# Patient Record
Sex: FEMALE | Race: OTHER | Employment: FULL TIME | URBAN - METROPOLITAN AREA
[De-identification: names, ages, dates, MRNs, and addresses within clinical notes are randomized per-mention and may not be internally consistent; named-entity substitution may affect disease eponyms.]

---

## 2017-06-13 ENCOUNTER — HOSPITAL ENCOUNTER (OUTPATIENT)
Age: 42
Discharge: HOME | End: 2017-06-13
Attending: EMERGENCY MEDICINE
Payer: COMMERCIAL

## 2017-06-13 ENCOUNTER — APPOINTMENT (OUTPATIENT)
Dept: GENERAL RADIOLOGY | Age: 42
End: 2017-06-13
Attending: EMERGENCY MEDICINE
Payer: COMMERCIAL

## 2017-06-13 VITALS
RESPIRATION RATE: 16 BRPM | HEART RATE: 94 BPM | BODY MASS INDEX: 41.78 KG/M2 | WEIGHT: 260 LBS | DIASTOLIC BLOOD PRESSURE: 93 MMHG | SYSTOLIC BLOOD PRESSURE: 146 MMHG | OXYGEN SATURATION: 98 % | HEIGHT: 66 IN | TEMPERATURE: 98 F

## 2017-06-13 DIAGNOSIS — S86.911A KNEE STRAIN, RIGHT, INITIAL ENCOUNTER: Primary | ICD-10-CM

## 2017-06-13 PROCEDURE — 99203 OFFICE O/P NEW LOW 30 MIN: CPT

## 2017-06-13 PROCEDURE — 73560 X-RAY EXAM OF KNEE 1 OR 2: CPT | Performed by: EMERGENCY MEDICINE

## 2017-06-13 RX ORDER — ESCITALOPRAM OXALATE 10 MG/1
10 TABLET ORAL DAILY
COMMUNITY

## 2017-06-13 NOTE — ED PROVIDER NOTES
Patient Seen in: [facility] AND CLINICS Immediate Care In [address] [place] [address]    History   Patient presents with:  Lower Extremity Injury (musculoskeletal)    Stated Complaint: r knee pain    HPI  This evening patient was running to catch the train when she felt a pop i sounds and intact distal pulses. Pulmonary/Chest: Effort normal and breath sounds normal. No stridor. Abdominal: Soft. Bowel sounds are normal. There is no tenderness. Musculoskeletal: She exhibits no edema.         Right hip: Normal.        Right kn visit in 3 days  Follow up with your doctor is important      Medications Prescribed:  Current Discharge Medication List

## 2018-08-10 ENCOUNTER — HOSPITAL ENCOUNTER (OUTPATIENT)
Age: 43
Discharge: HOME | End: 2018-08-10
Attending: FAMILY MEDICINE
Payer: COMMERCIAL

## 2018-08-10 VITALS
DIASTOLIC BLOOD PRESSURE: 82 MMHG | TEMPERATURE: 99 F | OXYGEN SATURATION: 99 % | BODY MASS INDEX: 39 KG/M2 | HEART RATE: 85 BPM | SYSTOLIC BLOOD PRESSURE: 140 MMHG | RESPIRATION RATE: 16 BRPM | WEIGHT: 240 LBS

## 2018-08-10 DIAGNOSIS — J01.90 ACUTE SINUSITIS, RECURRENCE NOT SPECIFIED, UNSPECIFIED LOCATION: Primary | ICD-10-CM

## 2018-08-10 PROCEDURE — 99214 OFFICE O/P EST MOD 30 MIN: CPT

## 2018-08-10 PROCEDURE — 99213 OFFICE O/P EST LOW 20 MIN: CPT

## 2018-08-10 RX ORDER — AMOXICILLIN 875 MG/1
875 TABLET, COATED ORAL 2 TIMES DAILY
Qty: 20 TABLET | Refills: 0 | Status: SHIPPED | OUTPATIENT
Start: 2018-08-10 | End: 2018-08-20

## 2018-08-10 NOTE — ED PROVIDER NOTES
Pt seen in [facility] AND CLINICS Immediate Care In [place]      Stated Complaint: Patient presents with:  Cough/URI      --------------   RN assessment:     Cough, cold sx's for weeks  --------------    CC: cough, congestion    HPI:  Loman Rinne is a 37 ye Exam:   General :    Alert, cooperative, no distress, appears stated age   Head:    Normocephalic, without obvious abnormality, atraumatic   Eyes:    PERRL, conjunctiva/corneas clear, EOM's intact   Ears:    Normal TM's and external ear canals, both ears mouth 2 (two) times daily.   Qty: 20 tablet Refills: 0

## 2018-08-10 NOTE — ED INITIAL ASSESSMENT (HPI)
Cough with green phlegm, congestion, runny nose and sinus pressure x 3 weeks. +fever, chills, body aches.

## 2019-01-20 ENCOUNTER — HOSPITAL ENCOUNTER (OUTPATIENT)
Age: 44
Discharge: HOME | End: 2019-01-20
Attending: EMERGENCY MEDICINE
Payer: COMMERCIAL

## 2019-01-20 VITALS
OXYGEN SATURATION: 99 % | RESPIRATION RATE: 18 BRPM | DIASTOLIC BLOOD PRESSURE: 80 MMHG | BODY MASS INDEX: 36.16 KG/M2 | HEIGHT: 66 IN | WEIGHT: 225 LBS | SYSTOLIC BLOOD PRESSURE: 123 MMHG | TEMPERATURE: 98 F | HEART RATE: 83 BPM

## 2019-01-20 DIAGNOSIS — S61.217A LACERATION OF LEFT LITTLE FINGER, FOREIGN BODY PRESENCE UNSPECIFIED, NAIL DAMAGE STATUS UNSPECIFIED, INITIAL ENCOUNTER: Primary | ICD-10-CM

## 2019-01-20 PROCEDURE — 99213 OFFICE O/P EST LOW 20 MIN: CPT

## 2019-01-20 PROCEDURE — 90471 IMMUNIZATION ADMIN: CPT

## 2019-01-20 NOTE — ED PROVIDER NOTES
Patient Seen in: [facility] AND CLINICS Immediate Care In [address] [place] [address]    History   Patient presents with:  Laceration    Stated Complaint: laceration    HPI    Patient is a 51-year-old female that cut her left fifth finger with a mandolin slicer.   Happened earli days          Medications Prescribed:  Current Discharge Medication List

## 2019-05-09 ENCOUNTER — HOSPITAL ENCOUNTER (OUTPATIENT)
Age: 44
Discharge: HOME | End: 2019-05-09
Attending: EMERGENCY MEDICINE
Payer: COMMERCIAL

## 2019-05-09 VITALS
DIASTOLIC BLOOD PRESSURE: 68 MMHG | BODY MASS INDEX: 36.96 KG/M2 | OXYGEN SATURATION: 98 % | HEIGHT: 66 IN | HEART RATE: 80 BPM | TEMPERATURE: 98 F | WEIGHT: 230 LBS | SYSTOLIC BLOOD PRESSURE: 128 MMHG | RESPIRATION RATE: 16 BRPM

## 2019-05-09 DIAGNOSIS — B34.9 VIRAL SYNDROME: Primary | ICD-10-CM

## 2019-05-09 PROCEDURE — 85025 COMPLETE CBC W/AUTO DIFF WBC: CPT | Performed by: EMERGENCY MEDICINE

## 2019-05-09 PROCEDURE — 99214 OFFICE O/P EST MOD 30 MIN: CPT

## 2019-05-09 PROCEDURE — 80047 BASIC METABLC PNL IONIZED CA: CPT

## 2019-05-09 PROCEDURE — 96374 THER/PROPH/DIAG INJ IV PUSH: CPT

## 2019-05-09 PROCEDURE — 87502 INFLUENZA DNA AMP PROBE: CPT | Performed by: EMERGENCY MEDICINE

## 2019-05-09 RX ORDER — MELOXICAM 15 MG/1
TABLET ORAL
COMMUNITY
Start: 2019-05-04

## 2019-05-09 RX ORDER — ONDANSETRON 2 MG/ML
4 INJECTION INTRAMUSCULAR; INTRAVENOUS ONCE
Status: COMPLETED | OUTPATIENT
Start: 2019-05-09 | End: 2019-05-09

## 2019-05-09 RX ORDER — SODIUM CHLORIDE 9 MG/ML
1000 INJECTION, SOLUTION INTRAVENOUS ONCE
Status: COMPLETED | OUTPATIENT
Start: 2019-05-09 | End: 2019-05-09

## 2019-05-09 RX ORDER — ONDANSETRON 4 MG/1
4 TABLET, ORALLY DISINTEGRATING ORAL EVERY 6 HOURS PRN
Qty: 10 TABLET | Refills: 0 | Status: SHIPPED | OUTPATIENT
Start: 2019-05-09 | End: 2019-05-16

## 2019-05-09 NOTE — ED PROVIDER NOTES
Patient Seen in: [facility] AND [facility]INICS Immediate Care In [address] [place] [address]    History   Patient presents with:  Vomiting    Stated Complaint: weak/headache    HPI    Sudden onset of nausea on Sunday. Developed chills but no documented fever. She did vomit on Sunday. sensory deficit. She exhibits normal muscle tone. Skin: Skin is warm and dry. Capillary refill takes less than 2 seconds. Psychiatric: She has a normal mood and affect. Her behavior is normal.   Nursing note and vitals reviewed.            ED Course

## 2019-05-09 NOTE — ED INITIAL ASSESSMENT (HPI)
Pt states since Sunday, she has had weakness, headache and vomiting. No diarrhea, no cough, or sore throat.

## 2020-06-01 ENCOUNTER — TELEMEDICINE (OUTPATIENT)
Dept: ENDOCRINOLOGY CLINIC | Facility: CLINIC | Age: 45
End: 2020-06-01
Payer: COMMERCIAL

## 2020-06-01 DIAGNOSIS — R53.83 FATIGUE, UNSPECIFIED TYPE: Primary | ICD-10-CM

## 2020-06-01 PROCEDURE — 99203 OFFICE O/P NEW LOW 30 MIN: CPT | Performed by: INTERNAL MEDICINE

## 2020-06-01 NOTE — PROGRESS NOTES
Telehealth outside of [address] Verbal Consent   I conducted a telehealth visit with Andra Barbosa today, 06/01/20, which was completed using two-way, real-time interactive audio and video communication.  This has been done in good [place] [employer].  She was discussing her symptoms with friend and recommended evaluation by endocrine. Over the past few months she continues to eat well and exercise well but fatigue is persistent. +heat intolerance, no hair loss. Regular menstrual cycles. work of breathing. Skin:  normal moisture and skin texture  Hematologic:  no excessive bruising  Psychiatric:  oriented to time, self, and place      ASSESSMENT/PLAN:    1.   Fatigue  - Discussed nonspecific symptoms of thyroid disease  - Discussed that

## 2020-06-15 ENCOUNTER — APPOINTMENT (OUTPATIENT)
Dept: LAB | Age: 45
End: 2020-06-15
Attending: INTERNAL MEDICINE
Payer: COMMERCIAL

## 2020-06-15 DIAGNOSIS — R53.83 FATIGUE, UNSPECIFIED TYPE: ICD-10-CM

## 2020-06-15 PROCEDURE — 80053 COMPREHEN METABOLIC PANEL: CPT

## 2020-06-15 PROCEDURE — 86376 MICROSOMAL ANTIBODY EACH: CPT

## 2020-06-15 PROCEDURE — 83001 ASSAY OF GONADOTROPIN (FSH): CPT

## 2020-06-15 PROCEDURE — 82533 TOTAL CORTISOL: CPT

## 2020-06-15 PROCEDURE — 82306 VITAMIN D 25 HYDROXY: CPT

## 2020-06-15 PROCEDURE — 84443 ASSAY THYROID STIM HORMONE: CPT

## 2020-06-15 PROCEDURE — 80061 LIPID PANEL: CPT

## 2020-06-15 PROCEDURE — 36415 COLL VENOUS BLD VENIPUNCTURE: CPT

## 2020-06-15 PROCEDURE — 84439 ASSAY OF FREE THYROXINE: CPT

## 2020-06-17 ENCOUNTER — TELEPHONE (OUTPATIENT)
Dept: ENDOCRINOLOGY CLINIC | Facility: CLINIC | Age: 45
End: 2020-06-17

## 2020-06-17 DIAGNOSIS — E06.3 HASHIMOTO'S THYROIDITIS: Primary | ICD-10-CM

## 2020-06-17 NOTE — TELEPHONE ENCOUNTER
Please call patient - her labs do confirm diagnosis of autoimmune hypothyroidism or Hashimoto's thyroiditis. Recommend starting levothyroxine 50mcg PO daily, #30, refill 3 and recheck TSH, FT4 in 2 months. Thanks.

## 2020-06-18 ENCOUNTER — PATIENT MESSAGE (OUTPATIENT)
Dept: ENDOCRINOLOGY CLINIC | Facility: CLINIC | Age: 45
End: 2020-06-18

## 2020-06-18 DIAGNOSIS — E06.3 HASHIMOTO'S THYROIDITIS: ICD-10-CM

## 2020-06-18 DIAGNOSIS — R53.83 FATIGUE, UNSPECIFIED TYPE: Primary | ICD-10-CM

## 2020-06-18 RX ORDER — LEVOTHYROXINE SODIUM 0.05 MG/1
50 TABLET ORAL
Qty: 30 TABLET | Refills: 3 | Status: SHIPPED | OUTPATIENT
Start: 2020-06-18 | End: 2020-10-12

## 2020-06-18 NOTE — TELEPHONE ENCOUNTER
From: Huey Dale  To: Bella Kaur MD  Sent: 6/18/2020 1:13 PM CDT  Subject: Test Results Question    Lookong at the lab results, wanted to know if my free T3 and dhea sulfate were tested? If not, can I get those done too?     Pako Sanchez

## 2020-06-18 NOTE — TELEPHONE ENCOUNTER
Called patient and gave message by dr Miles Miles. Patient verbalized understanding of message.   rn discuss how to take medication first thing in the morning with water before breakfast .   rx sent to pharmacy and labs ordered  Sent patient a link to my chart p

## 2020-06-19 ENCOUNTER — APPOINTMENT (OUTPATIENT)
Dept: LAB | Age: 45
End: 2020-06-19
Attending: INTERNAL MEDICINE
Payer: COMMERCIAL

## 2020-06-19 DIAGNOSIS — E06.3 HASHIMOTO'S THYROIDITIS: ICD-10-CM

## 2020-06-19 DIAGNOSIS — R53.83 FATIGUE, UNSPECIFIED TYPE: ICD-10-CM

## 2020-06-19 PROCEDURE — 84443 ASSAY THYROID STIM HORMONE: CPT

## 2020-06-19 PROCEDURE — 84481 FREE ASSAY (FT-3): CPT

## 2020-06-19 PROCEDURE — 84439 ASSAY OF FREE THYROXINE: CPT

## 2020-06-19 PROCEDURE — 36415 COLL VENOUS BLD VENIPUNCTURE: CPT

## 2020-06-19 PROCEDURE — 82627 DEHYDROEPIANDROSTERONE: CPT

## 2020-09-14 ENCOUNTER — PATIENT MESSAGE (OUTPATIENT)
Dept: ENDOCRINOLOGY CLINIC | Facility: CLINIC | Age: 45
End: 2020-09-14

## 2020-09-14 DIAGNOSIS — E03.9 HYPOTHYROIDISM, UNSPECIFIED TYPE: Primary | ICD-10-CM

## 2020-09-15 NOTE — TELEPHONE ENCOUNTER
From: Chava Baumann  To: Haim Engel MD  Sent: 9/14/2020 5:44 PM CDT  Subject: Visit Follow-up Question    Should I be going for a recheck on blood now?

## 2020-09-15 NOTE — TELEPHONE ENCOUNTER
Dr. Gideon Montano,     Patient is due for TSH and FT4 based on 06/17/20 encounter. Do you want additional lab work? Previously this patient requested FT3 and DHEA. Please advise. Thank you.

## 2020-09-25 ENCOUNTER — LAB ENCOUNTER (OUTPATIENT)
Dept: LAB | Age: 45
End: 2020-09-25
Attending: INTERNAL MEDICINE
Payer: COMMERCIAL

## 2020-09-25 ENCOUNTER — TELEPHONE (OUTPATIENT)
Dept: ENDOCRINOLOGY CLINIC | Facility: CLINIC | Age: 45
End: 2020-09-25

## 2020-09-25 DIAGNOSIS — E03.9 HYPOTHYROIDISM, UNSPECIFIED TYPE: ICD-10-CM

## 2020-09-25 DIAGNOSIS — E03.9 HYPOTHYROIDISM, UNSPECIFIED TYPE: Primary | ICD-10-CM

## 2020-09-25 LAB
T3FREE SERPL-MCNC: 2.24 PG/ML (ref 2.4–4.2)
T4 FREE SERPL-MCNC: 1.1 NG/DL (ref 0.8–1.7)
TSI SER-ACNC: 3.06 MIU/ML (ref 0.36–3.74)

## 2020-09-25 PROCEDURE — 84443 ASSAY THYROID STIM HORMONE: CPT

## 2020-09-25 PROCEDURE — 84439 ASSAY OF FREE THYROXINE: CPT

## 2020-09-25 PROCEDURE — 84481 FREE ASSAY (FT-3): CPT

## 2020-09-25 PROCEDURE — 36415 COLL VENOUS BLD VENIPUNCTURE: CPT

## 2020-09-25 RX ORDER — LEVOTHYROXINE SODIUM 0.07 MG/1
75 TABLET ORAL
Qty: 90 TABLET | Refills: 1 | Status: SHIPPED | OUTPATIENT
Start: 2020-09-25 | End: 2021-03-31

## 2020-09-25 NOTE — TELEPHONE ENCOUNTER
r called patient with message by dr Osmani Amaya , verbalized understanding via repeat back . rn [facility] per [facility] request.  Labs ordered.

## 2020-09-25 NOTE — TELEPHONE ENCOUNTER
Please call patient - good news, her thyroid levels are improved but still not at goal.  Increase Levothyroxine to 75mcg PO daily #90, refill 1 and recheck TSH, Ft4 in 3 months. Thanks.

## 2020-10-12 RX ORDER — LEVOTHYROXINE SODIUM 0.05 MG/1
50 TABLET ORAL
Qty: 30 TABLET | Refills: 2 | Status: SHIPPED | OUTPATIENT
Start: 2020-10-12 | End: 2021-03-29

## 2020-11-20 ENCOUNTER — HOSPITAL ENCOUNTER (OUTPATIENT)
Age: 45
Discharge: HOME | End: 2020-11-20
Attending: EMERGENCY MEDICINE
Payer: COMMERCIAL

## 2020-11-20 VITALS
OXYGEN SATURATION: 99 % | HEART RATE: 98 BPM | WEIGHT: 230 LBS | SYSTOLIC BLOOD PRESSURE: 143 MMHG | DIASTOLIC BLOOD PRESSURE: 72 MMHG | HEIGHT: 66 IN | TEMPERATURE: 97 F | RESPIRATION RATE: 18 BRPM | BODY MASS INDEX: 36.96 KG/M2

## 2020-11-20 DIAGNOSIS — J06.9 VIRAL UPPER RESPIRATORY INFECTION: ICD-10-CM

## 2020-11-20 DIAGNOSIS — Z20.822 ENCOUNTER FOR LABORATORY TESTING FOR COVID-19 VIRUS: Primary | ICD-10-CM

## 2020-11-20 PROCEDURE — 87880 STREP A ASSAY W/OPTIC: CPT | Performed by: EMERGENCY MEDICINE

## 2020-11-20 PROCEDURE — 99213 OFFICE O/P EST LOW 20 MIN: CPT | Performed by: EMERGENCY MEDICINE

## 2020-11-20 NOTE — ED PROVIDER NOTES
Patient Seen in: [facility]      History   Patient presents with:  Sore Throat    Stated Complaint: STUFFY, SORE THROAT    HPI  2 days of runny nose postnasal drip and scratchy throat.   First day she noted significant fatigue and mild body ac are moist.      Pharynx: Uvula midline. No pharyngeal swelling, oropharyngeal exudate, posterior oropharyngeal erythema or uvula swelling.    Eyes:      Conjunctiva/sclera: Conjunctivae normal.   Neck:      Musculoskeletal: Normal range of motion and neck s List

## 2021-01-27 ENCOUNTER — LAB ENCOUNTER (OUTPATIENT)
Dept: LAB | Age: 46
End: 2021-01-27
Attending: INTERNAL MEDICINE
Payer: COMMERCIAL

## 2021-01-27 ENCOUNTER — PATIENT MESSAGE (OUTPATIENT)
Dept: ENDOCRINOLOGY CLINIC | Facility: CLINIC | Age: 46
End: 2021-01-27

## 2021-01-27 DIAGNOSIS — R73.01 IMPAIRED FASTING GLUCOSE: Primary | ICD-10-CM

## 2021-01-27 DIAGNOSIS — E03.9 HYPOTHYROIDISM, UNSPECIFIED TYPE: ICD-10-CM

## 2021-01-27 LAB
T4 FREE SERPL-MCNC: 1.3 NG/DL (ref 0.8–1.7)
TSI SER-ACNC: 1.78 MIU/ML (ref 0.36–3.74)

## 2021-01-27 PROCEDURE — 36415 COLL VENOUS BLD VENIPUNCTURE: CPT

## 2021-01-27 PROCEDURE — 84439 ASSAY OF FREE THYROXINE: CPT

## 2021-01-27 PROCEDURE — 84443 ASSAY THYROID STIM HORMONE: CPT

## 2021-01-27 NOTE — TELEPHONE ENCOUNTER
Dr. Dawood Pope,     Patient has labs pending to be drawn for TSH and FT4, Please review patient's complaints regarding increased thirst; would you like any additional labs to be drawn?      Looks like patient had full panel labs done on 6/19/20- including cortis

## 2021-01-27 NOTE — TELEPHONE ENCOUNTER
From: Ermias Munoz  To: Dinora Paz MD  Sent: 1/27/2021 8:46 AM CST  Subject: Other    I am going today for my labs. Sorry for thr delay. I did want to report that for the last several months I have become excessively thirsty.  I have dry mouth may be a

## 2021-01-28 ENCOUNTER — LAB ENCOUNTER (OUTPATIENT)
Dept: LAB | Facility: REFERENCE LAB | Age: 46
End: 2021-01-28
Attending: INTERNAL MEDICINE
Payer: COMMERCIAL

## 2021-01-28 DIAGNOSIS — R73.01 IMPAIRED FASTING GLUCOSE: ICD-10-CM

## 2021-01-28 LAB
ALBUMIN SERPL-MCNC: 3.7 G/DL (ref 3.4–5)
ALBUMIN/GLOB SERPL: 0.9 {RATIO} (ref 1–2)
ALP LIVER SERPL-CCNC: 91 U/L
ALT SERPL-CCNC: 21 U/L
ANION GAP SERPL CALC-SCNC: 5 MMOL/L (ref 0–18)
AST SERPL-CCNC: 13 U/L (ref 15–37)
BILIRUB SERPL-MCNC: 0.5 MG/DL (ref 0.1–2)
BUN BLD-MCNC: 14 MG/DL (ref 7–18)
BUN/CREAT SERPL: 15.1 (ref 10–20)
CALCIUM BLD-MCNC: 8.9 MG/DL (ref 8.5–10.1)
CHLORIDE SERPL-SCNC: 101 MMOL/L (ref 98–112)
CO2 SERPL-SCNC: 27 MMOL/L (ref 21–32)
CREAT BLD-MCNC: 0.93 MG/DL
EST. AVERAGE GLUCOSE BLD GHB EST-MCNC: 260 MG/DL (ref 68–126)
GLOBULIN PLAS-MCNC: 4.1 G/DL (ref 2.8–4.4)
GLUCOSE BLD-MCNC: 322 MG/DL (ref 70–99)
HBA1C MFR BLD HPLC: 10.7 % (ref ?–5.7)
M PROTEIN MFR SERPL ELPH: 7.8 G/DL (ref 6.4–8.2)
OSMOLALITY SERPL CALC.SUM OF ELEC: 289 MOSM/KG (ref 275–295)
PATIENT FASTING Y/N/NP: YES
POTASSIUM SERPL-SCNC: 4.3 MMOL/L (ref 3.5–5.1)
SODIUM SERPL-SCNC: 133 MMOL/L (ref 136–145)

## 2021-01-28 PROCEDURE — 80053 COMPREHEN METABOLIC PANEL: CPT

## 2021-01-28 PROCEDURE — 83036 HEMOGLOBIN GLYCOSYLATED A1C: CPT

## 2021-01-28 PROCEDURE — 36415 COLL VENOUS BLD VENIPUNCTURE: CPT

## 2021-01-29 ENCOUNTER — TELEPHONE (OUTPATIENT)
Dept: ENDOCRINOLOGY CLINIC | Facility: CLINIC | Age: 46
End: 2021-01-29

## 2021-01-29 NOTE — TELEPHONE ENCOUNTER
Left VM for patient to call - Please tell patient her labs do demonstrate a new diagnosis of diabetes which is causing her current symptoms. I would recommend scheduling appointment with APN next week. Thanks.

## 2021-02-01 ENCOUNTER — OFFICE VISIT (OUTPATIENT)
Dept: ENDOCRINOLOGY CLINIC | Facility: CLINIC | Age: 46
End: 2021-02-01
Payer: COMMERCIAL

## 2021-02-01 VITALS
WEIGHT: 256 LBS | DIASTOLIC BLOOD PRESSURE: 98 MMHG | HEART RATE: 85 BPM | BODY MASS INDEX: 41 KG/M2 | SYSTOLIC BLOOD PRESSURE: 149 MMHG

## 2021-02-01 DIAGNOSIS — E11.65 TYPE 2 DIABETES MELLITUS WITH HYPERGLYCEMIA, WITHOUT LONG-TERM CURRENT USE OF INSULIN (HCC): Primary | ICD-10-CM

## 2021-02-01 LAB
GLUCOSE BLOOD: 227
TEST STRIP LOT #: NORMAL NUMERIC

## 2021-02-01 PROCEDURE — 36416 COLLJ CAPILLARY BLOOD SPEC: CPT | Performed by: NURSE PRACTITIONER

## 2021-02-01 PROCEDURE — 3077F SYST BP >= 140 MM HG: CPT | Performed by: NURSE PRACTITIONER

## 2021-02-01 PROCEDURE — 82947 ASSAY GLUCOSE BLOOD QUANT: CPT | Performed by: NURSE PRACTITIONER

## 2021-02-01 PROCEDURE — 99205 OFFICE O/P NEW HI 60 MIN: CPT | Performed by: NURSE PRACTITIONER

## 2021-02-01 PROCEDURE — 3080F DIAST BP >= 90 MM HG: CPT | Performed by: NURSE PRACTITIONER

## 2021-02-01 RX ORDER — BLOOD-GLUCOSE METER
EACH MISCELLANEOUS
Qty: 1 KIT | Refills: 0 | Status: SHIPPED | OUTPATIENT
Start: 2021-02-01 | End: 2021-03-29

## 2021-02-01 RX ORDER — BLOOD SUGAR DIAGNOSTIC
STRIP MISCELLANEOUS
Qty: 180 EACH | Refills: 1 | Status: SHIPPED | OUTPATIENT
Start: 2021-02-01 | End: 2021-03-29

## 2021-02-01 RX ORDER — LANCETS
EACH MISCELLANEOUS
Qty: 180 EACH | Refills: 1 | Status: SHIPPED | OUTPATIENT
Start: 2021-02-01 | End: 2021-03-29

## 2021-02-01 NOTE — PROGRESS NOTES
Name: Tim Griffiths  Date: 2/1/2021    CHIEF COMPLAINT   No chief complaint on file. HISTORY OF PRESENT ILLNESS   Tim Griffiths is a 55year old female who presents for new consultation on diabetes management.    HgbA1C: 10.7% on 1/28/2021  Blood glu discomfort, palpitations  GI: Negative for:  abdominal pain, nausea, vomiting, diarrhea, heartburn, constipation  Neurology: Negative for: headache, dizziness, syncope, numbness/tingling, or weakness.    Genito-Urinary: Negative for: dysuria, frequency or h and normal sclera   Throat/Neck:  normal sound to voice. Respiratory: non-labored. no increased work of breathing.     Musculoskeletal:  normal muscle strength and tone  Skin:  normal moisture and skin texture  Psychiatric:  oriented to time, self, and 1/28/2021   c) Discussed importance of annual eye exams  e) start testing BG readings 1x daily- alternate testing times with before meals or 2hrs after meals.    f) Life style changes: Diet: low carbohydrate diet 30-45gm per meal discussed, Exercise: should

## 2021-02-01 NOTE — PATIENT INSTRUCTIONS
A1C:10.7% on 1/28/2021  Blood glucose: 227 in clinic today       Medications:   Start metformin 1,000mg twice daily with titration    Week 1: 500mg with dinner    Week 2: 500mg with breakfast and 500mg with dinner meal    Week 3: 500mg with breakfast and 1 include steroids, some water pills (diuretics), and medicines for mental health (antipsychotics). · Race and ethnicity. People who have , , [address], [place], or [place] [facility]) heritage are more likely to develop type 2 diabetes.   · Ge erythropoietin therapy. · Fasting plasma glucose (FPG). This test checks your blood glucose levels after 8 hours of fasting. You usually get this test before your first meal of the day. This is called your fasting blood glucose level.  A result higher than what foods to plan your meals around. · Weight loss. Losing just 5% to 7% of your body weight can help. Talk with your healthcare provider about ways to help you lose weight. · Taking medicine.  There are different types of medicines to treat type 2 diabe problems. · The goal of treatment is to keep your blood sugar levels as close to normal as possible, but not too low. You will need to control your blood sugar. You will need to get physical activity, plan meals, and get regular healthcare.     Next steps

## 2021-02-08 ENCOUNTER — PATIENT MESSAGE (OUTPATIENT)
Dept: ENDOCRINOLOGY CLINIC | Facility: CLINIC | Age: 46
End: 2021-02-08

## 2021-02-09 NOTE — TELEPHONE ENCOUNTER
From: Emily Ho  To: Chandler Hernández MD  Sent: 2/8/2021 8:19 PM CST  Subject: Test Results Question    So, following my blood work that you ordered which showed that I have diabetes, I opted to see a doctor that does what she calls 'integrative medicine'

## 2021-02-09 NOTE — TELEPHONE ENCOUNTER
Routing to Dr. Ruben Muñiz to advise and review attached blood test result.     Last seen by APN 2/1/21 and Dr. Ruben Muñiz on 06/01/20

## 2021-02-10 ENCOUNTER — HOSPITAL ENCOUNTER (OUTPATIENT)
Dept: ENDOCRINOLOGY | Facility: HOSPITAL | Age: 46
Discharge: HOME | End: 2021-02-10
Attending: NURSE PRACTITIONER
Payer: COMMERCIAL

## 2021-02-10 DIAGNOSIS — E11.9 TYPE 2 DIABETES MELLITUS WITHOUT COMPLICATION, WITHOUT LONG-TERM CURRENT USE OF INSULIN (HCC): Primary | ICD-10-CM

## 2021-02-10 NOTE — PROGRESS NOTES
Mic Solorzano  : 1975 attended individual initial assessment for Diabetes Education:    Date: 2/10/2021   Start time: 1 pm  End time: 2 pm    HgbA1C (%)   Date Value   2021 10.7 (H)        Assessment: Pt newly diagnosed type 2 DM.   Harley mariee series (4 classes)      Prescriptions sent to preferred pharmacy for blood glucose meter, test strips and lancets per protocol. Written materials provided for all areas covered.     Patient verbalized understanding and has no further questions at this time

## 2021-02-10 NOTE — TELEPHONE ENCOUNTER
Called patient to discuss lab results - Ok to take magnesium and Vitamin B. However would not recommend DHEA or cortisol supplement. Also would not recommend testosterone pellet.   Discussed importance of not taking any adrenal gland supplementation given

## 2021-03-01 ENCOUNTER — TELEPHONE (OUTPATIENT)
Dept: RHEUMATOLOGY | Facility: CLINIC | Age: 46
End: 2021-03-01

## 2021-03-01 NOTE — TELEPHONE ENCOUNTER
Patient was scheduled for an appointment with Nicolas Vides. Patient was notified and sent an appointment reminder, no response. Patient came in and was checked into the appointment.  Nicolas Vides then notified me that her patient was checked in in-person and to ask the p

## 2021-03-28 NOTE — PROGRESS NOTES
Andra Barbosa is a 55year old female who presents today to establish for type 2 diabetes management.    Primary care physician: between PCP offices     In the past 3m DM control has improved to 7.0% A1C  (1-2021 A1C was 10.7%)   She is testing 2- 3 x keo Diabetes ([facility] [address]    • Hypothyroidism     1-2021 + Hashimoto     Past Surgical History:   Procedure Laterality Date   • ABLATION  02/12/2021    cervical   • KNEE REPLACEMENT SURGERY       Social History    Tobacco Use      Smoking status: Never Smoker      Sm Heart sounds: Normal heart sounds. Pulmonary:      Effort: Pulmonary effort is normal.      Breath sounds: Normal breath sounds. Musculoskeletal:      Comments: Diabetes foot exam:   Good foot hygiene.    Bilateral barefoot skin diabetic exam: normal. follow up  Continue working janeen Mendoza for support/guidance with Diabetes      The patient is asked to return in 3 m  but recommended to contact DM clinic sooner if questions or concerns.     The patient indicates understanding of these issues a

## 2021-03-28 NOTE — TELEPHONE ENCOUNTER
Valerio Number,     Please review this refill request. Pt finding another provider?  3 mos of DM meds sent on 2/1/21 - however I do not see that thyroid meds were sent? Please review and advise as I am not able to fill per protocol while Dr Rivera Erps in out this week.     LOV 2/1/21  No F/U (cancelled 3/2/21, 3/1/21)    Med increased to 75 mcg 9/25/20

## 2021-03-29 ENCOUNTER — OFFICE VISIT (OUTPATIENT)
Dept: ENDOCRINOLOGY CLINIC | Facility: CLINIC | Age: 46
End: 2021-03-29
Payer: COMMERCIAL

## 2021-03-29 ENCOUNTER — PATIENT MESSAGE (OUTPATIENT)
Dept: ENDOCRINOLOGY CLINIC | Facility: CLINIC | Age: 46
End: 2021-03-29

## 2021-03-29 VITALS
SYSTOLIC BLOOD PRESSURE: 130 MMHG | OXYGEN SATURATION: 99 % | HEART RATE: 101 BPM | DIASTOLIC BLOOD PRESSURE: 68 MMHG | WEIGHT: 249 LBS | HEIGHT: 66 IN | BODY MASS INDEX: 40.02 KG/M2

## 2021-03-29 DIAGNOSIS — E11.65 TYPE 2 DIABETES MELLITUS WITH HYPERGLYCEMIA, WITHOUT LONG-TERM CURRENT USE OF INSULIN (HCC): Primary | ICD-10-CM

## 2021-03-29 DIAGNOSIS — E06.3 HASHIMOTO'S THYROIDITIS: ICD-10-CM

## 2021-03-29 LAB
CARTRIDGE LOT#: 762 NUMERIC
HEMOGLOBIN A1C: 7 % (ref 4.3–5.6)

## 2021-03-29 PROCEDURE — 3008F BODY MASS INDEX DOCD: CPT | Performed by: NURSE PRACTITIONER

## 2021-03-29 PROCEDURE — 83036 HEMOGLOBIN GLYCOSYLATED A1C: CPT | Performed by: NURSE PRACTITIONER

## 2021-03-29 PROCEDURE — 99215 OFFICE O/P EST HI 40 MIN: CPT | Performed by: NURSE PRACTITIONER

## 2021-03-29 PROCEDURE — 3078F DIAST BP <80 MM HG: CPT | Performed by: NURSE PRACTITIONER

## 2021-03-29 PROCEDURE — 3075F SYST BP GE 130 - 139MM HG: CPT | Performed by: NURSE PRACTITIONER

## 2021-03-29 RX ORDER — COLLAGENASE CLOSTRIDIUM HIST.
POWDER (EA) MISCELLANEOUS
COMMUNITY
Start: 2021-01-04

## 2021-03-29 RX ORDER — CYCLOBENZAPRINE HCL 10 MG
TABLET ORAL
COMMUNITY
Start: 2020-11-16

## 2021-03-29 NOTE — PATIENT INSTRUCTIONS
Your A1C: 7.0% (last A1C 10.7%)   This is so much better for you and beneficial for your long term health     Dr Sahil Hanson- endocrinologist   [address] [place] [address], [address] [place] [address] Rd  [phone]    The A1C test provides us with your average detection of N-Nitrosodimethylamine ([address]) chemical   [address] is a common contaminant found in water and foods including cured and grilled meats, dairy products and vegetables. Everyone is exposed to some level of [address].  Low levels of [address] are commonly ingeste across medication types. The FDA has maintained that it would continue with ongoing testing of metformin and other medications, and if any levels of [address] or other impurities were identified, membreno action would be taken.      The FDA and the international s providers  do not authorize routine medications on weekends. · If your prescription is due for a refill, you may be due for a follow up appointment. This may impact the ability for you to get a 90 supply if requested.    · To best provide you care, patient

## 2021-03-29 NOTE — TELEPHONE ENCOUNTER
From: Rayray Guthrie  To: TALIA Rader  Sent: 3/29/2021 9:33 AM CDT  Subject: Other    Other supplements I take.

## 2021-03-29 NOTE — ASSESSMENT & PLAN NOTE
Names provided for endocrinology or she can maintain w PCP for hypothyroidism management   Advised since she has confirmed autoimmune hypothyroid NOT to stop Levothyroxine rx.

## 2021-03-31 RX ORDER — LEVOTHYROXINE SODIUM 0.07 MG/1
75 TABLET ORAL
Qty: 90 TABLET | Refills: 0 | Status: SHIPPED | OUTPATIENT
Start: 2021-03-31 | End: 2021-04-22

## 2021-04-05 RX ORDER — LEVOTHYROXINE SODIUM 0.07 MG/1
75 TABLET ORAL
Qty: 30 TABLET | Refills: 3 | OUTPATIENT
Start: 2021-04-05

## 2021-04-22 RX ORDER — LEVOTHYROXINE SODIUM 0.07 MG/1
75 TABLET ORAL
Qty: 30 TABLET | Refills: 2 | Status: SHIPPED | OUTPATIENT
Start: 2021-04-22 | End: 2021-07-19

## 2021-05-17 ENCOUNTER — EKG ENCOUNTER (OUTPATIENT)
Dept: LAB | Age: 46
End: 2021-05-17
Attending: INTERNAL MEDICINE
Payer: COMMERCIAL

## 2021-05-17 ENCOUNTER — OFFICE VISIT (OUTPATIENT)
Dept: INTERNAL MEDICINE CLINIC | Facility: CLINIC | Age: 46
End: 2021-05-17
Payer: COMMERCIAL

## 2021-05-17 VITALS
SYSTOLIC BLOOD PRESSURE: 138 MMHG | WEIGHT: 248 LBS | DIASTOLIC BLOOD PRESSURE: 79 MMHG | BODY MASS INDEX: 39.86 KG/M2 | HEART RATE: 76 BPM | HEIGHT: 66 IN

## 2021-05-17 DIAGNOSIS — E11.9 TYPE 2 DIABETES MELLITUS WITHOUT COMPLICATION, WITHOUT LONG-TERM CURRENT USE OF INSULIN (HCC): ICD-10-CM

## 2021-05-17 DIAGNOSIS — E06.3 HASHIMOTO'S THYROIDITIS: Primary | ICD-10-CM

## 2021-05-17 DIAGNOSIS — E55.9 VITAMIN D DEFICIENCY: ICD-10-CM

## 2021-05-17 DIAGNOSIS — F41.9 ANXIETY: ICD-10-CM

## 2021-05-17 DIAGNOSIS — Z12.31 SCREENING MAMMOGRAM, ENCOUNTER FOR: ICD-10-CM

## 2021-05-17 PROCEDURE — 3075F SYST BP GE 130 - 139MM HG: CPT | Performed by: INTERNAL MEDICINE

## 2021-05-17 PROCEDURE — 3078F DIAST BP <80 MM HG: CPT | Performed by: INTERNAL MEDICINE

## 2021-05-17 PROCEDURE — 3008F BODY MASS INDEX DOCD: CPT | Performed by: INTERNAL MEDICINE

## 2021-05-17 PROCEDURE — 93010 ELECTROCARDIOGRAM REPORT: CPT | Performed by: INTERNAL MEDICINE

## 2021-05-17 PROCEDURE — 99204 OFFICE O/P NEW MOD 45 MIN: CPT | Performed by: INTERNAL MEDICINE

## 2021-05-17 PROCEDURE — 93005 ELECTROCARDIOGRAM TRACING: CPT

## 2021-05-17 RX ORDER — GREEN TEA/HOODIA GORDONII 315-12.5MG
CAPSULE ORAL
COMMUNITY

## 2021-05-17 RX ORDER — MAGNESIUM 200 MG
TABLET ORAL
COMMUNITY

## 2021-05-17 NOTE — PROGRESS NOTES
Viola Hernandez is a 55year old female.   Patient presents with:  Establish Care      HPI:   New pt-- used to see radha forbes [facility] in [facility] -- moved to [place] 5 yrs ago   C/c establish care   C/o needs to establish care with a primary care physician and get ch meniscus repair    • Other surgical history      exp lap    • Other surgical history      sinus surg x 2       Social History:  Social History    Tobacco Use      Smoking status: Never Smoker      Smokeless tobacco: Never Used    Vaping Use      Vaping Use Exercise 30 min a day         Preventive medicine  Pap smear she goes to [facility] - ordered       The patient indicates understanding of these issues and agrees to the plan. No follow-ups on file.

## 2021-05-18 ENCOUNTER — PATIENT MESSAGE (OUTPATIENT)
Dept: INTERNAL MEDICINE CLINIC | Facility: CLINIC | Age: 46
End: 2021-05-18

## 2021-05-18 ENCOUNTER — TELEPHONE (OUTPATIENT)
Dept: INTERNAL MEDICINE CLINIC | Facility: CLINIC | Age: 46
End: 2021-05-18

## 2021-05-18 DIAGNOSIS — R07.89 ATYPICAL CHEST PAIN: Primary | ICD-10-CM

## 2021-05-18 NOTE — TELEPHONE ENCOUNTER
Patient states she seen Dr. Elda Wen yesterday about chest pain. Pain located on right side of chest under collar bone. Per patient, pain is sporadic. Pain currently gone. Per patient, she has been having chest pain for several weeks.      Patient st

## 2021-05-18 NOTE — TELEPHONE ENCOUNTER
----- Message from [address] [place], [place] [address]Jailene Bob sent at 5/18/2021  4:38 PM CDT -----  Regarding: Non-Urgent Medical Question  Contact: [phone]  Thanks for message om EKG. I keep getting these mild pains in my chest. I am having them now.  I think I should see a car

## 2021-05-18 NOTE — TELEPHONE ENCOUNTER
From: Mic Solorzano  To: Toshia Galicia MD  Sent: 5/18/2021 4:38 PM CDT  Subject: Non-Urgent Medical Question    Thanks for message om EKG. I keep getting these mild pains in my chest. I am having them now.  I think I should see a cardiologist just to find

## 2021-05-25 ENCOUNTER — TELEPHONE (OUTPATIENT)
Dept: CARDIOLOGY CLINIC | Facility: CLINIC | Age: 46
End: 2021-05-25

## 2021-05-25 NOTE — TELEPHONE ENCOUNTER
Detailed message left (HIPAA verified) offered 6/11 at 3:10 consult with Dr. Joan Currie (on waiting list). She currently has appointment on 6/25. Asked her to call ASAP as there are others waiting. Please advise if patient accepts.

## 2021-05-27 ENCOUNTER — HOSPITAL ENCOUNTER (OUTPATIENT)
Dept: MAMMOGRAPHY | Age: 46
Discharge: HOME | End: 2021-05-27
Attending: INTERNAL MEDICINE
Payer: COMMERCIAL

## 2021-05-27 DIAGNOSIS — Z12.31 SCREENING MAMMOGRAM, ENCOUNTER FOR: ICD-10-CM

## 2021-05-27 PROCEDURE — 77063 BREAST TOMOSYNTHESIS BI: CPT | Performed by: INTERNAL MEDICINE

## 2021-05-27 PROCEDURE — 77067 SCR MAMMO BI INCL CAD: CPT | Performed by: INTERNAL MEDICINE

## 2021-06-11 ENCOUNTER — OFFICE VISIT (OUTPATIENT)
Dept: CARDIOLOGY CLINIC | Facility: CLINIC | Age: 46
End: 2021-06-11
Payer: COMMERCIAL

## 2021-06-11 DIAGNOSIS — Z82.49 FAMILY HISTORY OF CORONARY ARTERY DISEASE IN FATHER: ICD-10-CM

## 2021-06-11 DIAGNOSIS — R07.2 PRECORDIAL PAIN: Primary | ICD-10-CM

## 2021-06-11 PROCEDURE — 99244 OFF/OP CNSLTJ NEW/EST MOD 40: CPT | Performed by: INTERNAL MEDICINE

## 2021-06-11 RX ORDER — SEMAGLUTIDE 1.34 MG/ML
INJECTION, SOLUTION SUBCUTANEOUS
COMMUNITY
Start: 2021-05-19

## 2021-06-11 NOTE — PATIENT INSTRUCTIONS
Continue current medications. Proceed with stress echocardiogram as discussed. Follow-up with me in 4 weeks or sooner if needed.

## 2021-06-11 NOTE — PROGRESS NOTES
Julius Robins is a 55year old female. Patient presents with:  Consult  Chest Pain: right sided chest pain    HPI:   Patient is here for a new patient appointment. She has recently been diagnosed diabetes and has been treated for it aggressively.   She has Never Used    Vaping Use      Vaping Use: Never assessed    Alcohol use: Yes      Comment: occa    Drug use: Never      Comment: occ gummies        REVIEW OF SYSTEMS:   GENERAL HEALTH: feels well otherwise  SKIN: denies any unusual skin lesions or rashes

## 2021-07-19 RX ORDER — LEVOTHYROXINE SODIUM 0.07 MG/1
75 TABLET ORAL
Qty: 30 TABLET | Refills: 2 | Status: SHIPPED | OUTPATIENT
Start: 2021-07-19 | End: 2021-10-15

## 2021-10-15 RX ORDER — LEVOTHYROXINE SODIUM 0.07 MG/1
75 TABLET ORAL
Qty: 30 TABLET | Refills: 2 | Status: SHIPPED | OUTPATIENT
Start: 2021-10-15

## 2022-01-17 ENCOUNTER — TELEPHONE (OUTPATIENT)
Dept: ENDOCRINOLOGY CLINIC | Facility: CLINIC | Age: 47
End: 2022-01-17

## 2022-01-17 NOTE — TELEPHONE ENCOUNTER
Received fax on pt going on a statin medication from [facility]. Called pt and pt is now being seen by TA Tiwari. LOV with dick 3/2021 was told to see an endocrinologist at last visit.

## 2022-02-21 ENCOUNTER — PATIENT MESSAGE (OUTPATIENT)
Dept: INTERNAL MEDICINE CLINIC | Facility: CLINIC | Age: 47
End: 2022-02-21

## 2022-02-22 NOTE — TELEPHONE ENCOUNTER
Please see my chart and advise, pended script if you agree to fill. Patient's last office visit was 5/17/21 and medication reads as historical in chart.  Advised to call and schedule a physical.

## 2022-02-23 RX ORDER — CYCLOBENZAPRINE HCL 10 MG
10 TABLET ORAL NIGHTLY
Qty: 30 TABLET | Refills: 0 | Status: SHIPPED | OUTPATIENT
Start: 2022-02-23 | End: 2022-03-23

## 2022-02-23 NOTE — TELEPHONE ENCOUNTER
The patient was called and informed with understanding. Phone numbers were given. Will call us back if needed.

## 2022-03-21 NOTE — TELEPHONE ENCOUNTER
Please review. Protocol Failed or has no Protocol.     Requested Prescriptions   Pending Prescriptions Disp Refills    CYCLOBENZAPRINE 10 MG Oral Tab [Pharmacy Med Name: CYCLOBENZAPRINE 10 MG TABLET] 30 tablet 0     Sig: TAKE 1 TABLET BY MOUTH EVERY DAY AT NIGHT        There is no refill protocol information for this order          Recent Outpatient Visits              9 months ago Precordial pain    [facility] Evelina Miguel MD    Office Visit    10 months ago Hashimoto's thyroiditis    [place] [facility], [address]    Office Visit    11 months ago Type 2 diabetes mellitus with hyperglycemia, without long-term current use of insulin ([facility]    [facility], TALIA Andrade    Office Visit    1 year ago Type 2 diabetes mellitus with hyperglycemia, without long-term current use of insulin [facility])    [facility], [facility] TALIA Berry    Office Visit    1 year ago Fatigue, unspecified type    [facility], [facility] Yvonne Mart MD    Telemedicine

## 2022-03-22 RX ORDER — CYCLOBENZAPRINE HCL 10 MG
10 TABLET ORAL NIGHTLY
Qty: 30 TABLET | Refills: 1 | OUTPATIENT
Start: 2022-03-22

## 2022-03-22 NOTE — TELEPHONE ENCOUNTER
No Virgili medication-may be intermittent refill  Patient is also due for follow-up appointment at her convenience.

## 2022-03-23 ENCOUNTER — TELEMEDICINE (OUTPATIENT)
Dept: INTERNAL MEDICINE CLINIC | Facility: CLINIC | Age: 47
End: 2022-03-23

## 2022-03-23 DIAGNOSIS — E11.65 TYPE 2 DIABETES MELLITUS WITH HYPERGLYCEMIA, WITHOUT LONG-TERM CURRENT USE OF INSULIN (HCC): ICD-10-CM

## 2022-03-23 DIAGNOSIS — F32.A DEPRESSION, UNSPECIFIED DEPRESSION TYPE: ICD-10-CM

## 2022-03-23 DIAGNOSIS — Z12.11 COLON CANCER SCREENING: ICD-10-CM

## 2022-03-23 DIAGNOSIS — M47.812 OSTEOARTHRITIS OF CERVICAL SPINE, UNSPECIFIED SPINAL OSTEOARTHRITIS COMPLICATION STATUS: ICD-10-CM

## 2022-03-23 DIAGNOSIS — M47.816 OSTEOARTHRITIS OF LUMBAR SPINE, UNSPECIFIED SPINAL OSTEOARTHRITIS COMPLICATION STATUS: Primary | ICD-10-CM

## 2022-03-23 PROCEDURE — 99213 OFFICE O/P EST LOW 20 MIN: CPT | Performed by: INTERNAL MEDICINE

## 2022-03-23 RX ORDER — ESCITALOPRAM OXALATE 10 MG/1
10 TABLET ORAL DAILY
Qty: 90 TABLET | Refills: 3 | Status: SHIPPED | OUTPATIENT
Start: 2022-03-23

## 2022-03-23 RX ORDER — MELOXICAM 15 MG/1
15 TABLET ORAL DAILY
Qty: 90 TABLET | Refills: 3 | Status: SHIPPED | OUTPATIENT
Start: 2022-03-23

## 2022-03-23 RX ORDER — CYCLOBENZAPRINE HCL 10 MG
10 TABLET ORAL NIGHTLY
Qty: 90 TABLET | Refills: 3 | Status: SHIPPED | OUTPATIENT
Start: 2022-03-23

## 2022-04-14 ENCOUNTER — HOSPITAL ENCOUNTER (OUTPATIENT)
Dept: MRI IMAGING | Age: 47
Discharge: HOME | End: 2022-04-14
Attending: ANESTHESIOLOGY
Payer: COMMERCIAL

## 2022-04-14 DIAGNOSIS — M54.2 CERVICAL PAIN: ICD-10-CM

## 2022-04-14 PROCEDURE — 72141 MRI NECK SPINE W/O DYE: CPT | Performed by: ANESTHESIOLOGY

## 2022-04-22 PROCEDURE — 3044F HG A1C LEVEL LT 7.0%: CPT | Performed by: INTERNAL MEDICINE

## 2022-06-10 ENCOUNTER — LAB ENCOUNTER (OUTPATIENT)
Dept: LAB | Age: 47
End: 2022-06-10
Attending: INTERNAL MEDICINE
Payer: COMMERCIAL

## 2022-06-10 ENCOUNTER — OFFICE VISIT (OUTPATIENT)
Dept: INTERNAL MEDICINE CLINIC | Facility: CLINIC | Age: 47
End: 2022-06-10
Payer: COMMERCIAL

## 2022-06-10 VITALS
HEART RATE: 80 BPM | HEIGHT: 66 IN | SYSTOLIC BLOOD PRESSURE: 138 MMHG | WEIGHT: 239 LBS | DIASTOLIC BLOOD PRESSURE: 84 MMHG | BODY MASS INDEX: 38.41 KG/M2

## 2022-06-10 DIAGNOSIS — E11.65 TYPE 2 DIABETES MELLITUS WITH HYPERGLYCEMIA, WITHOUT LONG-TERM CURRENT USE OF INSULIN (HCC): ICD-10-CM

## 2022-06-10 DIAGNOSIS — Z00.00 PHYSICAL EXAM, ANNUAL: Primary | ICD-10-CM

## 2022-06-10 DIAGNOSIS — Z12.31 SCREENING MAMMOGRAM, ENCOUNTER FOR: ICD-10-CM

## 2022-06-10 PROBLEM — E66.01 MORBID (SEVERE) OBESITY DUE TO EXCESS CALORIES (HCC): Status: ACTIVE | Noted: 2022-06-10

## 2022-06-10 PROBLEM — F33.0 MAJOR DEPRESSIVE DISORDER, RECURRENT, MILD (HCC): Status: ACTIVE | Noted: 2022-06-10

## 2022-06-10 PROBLEM — F33.0 MAJOR DEPRESSIVE DISORDER, RECURRENT, MILD: Status: ACTIVE | Noted: 2022-06-10

## 2022-06-10 LAB
CREAT UR-SCNC: 73.3 MG/DL
MICROALBUMIN UR-MCNC: 0.54 MG/DL
MICROALBUMIN/CREAT 24H UR-RTO: 7.4 UG/MG (ref ?–30)

## 2022-06-10 PROCEDURE — 99396 PREV VISIT EST AGE 40-64: CPT | Performed by: INTERNAL MEDICINE

## 2022-06-10 PROCEDURE — 3008F BODY MASS INDEX DOCD: CPT | Performed by: INTERNAL MEDICINE

## 2022-06-10 PROCEDURE — 82043 UR ALBUMIN QUANTITATIVE: CPT

## 2022-06-10 PROCEDURE — 3079F DIAST BP 80-89 MM HG: CPT | Performed by: INTERNAL MEDICINE

## 2022-06-10 PROCEDURE — 82570 ASSAY OF URINE CREATININE: CPT

## 2022-06-10 PROCEDURE — 3075F SYST BP GE 130 - 139MM HG: CPT | Performed by: INTERNAL MEDICINE

## 2022-06-10 RX ORDER — DULAGLUTIDE 0.75 MG/.5ML
INJECTION, SOLUTION SUBCUTANEOUS
COMMUNITY
Start: 2021-12-28

## 2022-06-10 RX ORDER — METFORMIN HYDROCHLORIDE 500 MG/1
500 TABLET, EXTENDED RELEASE ORAL DAILY
COMMUNITY
Start: 2022-05-20

## 2022-06-17 ENCOUNTER — HOSPITAL ENCOUNTER (OUTPATIENT)
Dept: MAMMOGRAPHY | Age: 47
Discharge: HOME | End: 2022-06-17
Attending: INTERNAL MEDICINE
Payer: COMMERCIAL

## 2022-06-17 DIAGNOSIS — Z12.31 SCREENING MAMMOGRAM, ENCOUNTER FOR: ICD-10-CM

## 2022-06-17 PROCEDURE — 77063 BREAST TOMOSYNTHESIS BI: CPT | Performed by: INTERNAL MEDICINE

## 2022-06-17 PROCEDURE — 77067 SCR MAMMO BI INCL CAD: CPT | Performed by: INTERNAL MEDICINE

## 2022-06-21 ENCOUNTER — NURSE ONLY (OUTPATIENT)
Dept: GASTROENTEROLOGY | Facility: CLINIC | Age: 47
End: 2022-06-21

## 2022-06-21 DIAGNOSIS — Z12.11 SCREEN FOR COLON CANCER: Primary | ICD-10-CM

## 2022-06-21 NOTE — PROGRESS NOTES
Dx: average risk crc screening  Colonoscopy with IV or MAC sedation  Split dose golytely preparation, sent to pharmacy  Please review prep instructions with patient    - HOLD ACE/ARBs the night before and/or the day of the procedure(s) -   - NO herbal supplements or weight loss medications x 7 days prior to the procedure(s)    - hold metformin day prior to procedure.  trulicity per endocrine

## 2022-06-21 NOTE — PROGRESS NOTES
Dr. Swathi Buchanan,     Called patient for a scheduled telephone colon screening. Hx of lap band     GI MD preference: no   Insurance:   BCBS  CBC from: 4/22/2022 show no signs of anemia   PCP visit on: 6/10/2022    First colonoscopy: yes     Anticoagulants: no  Diabetic Meds:  trulicity q Thursday , metformin   BP meds(Ace inhibitors/ARB's): no  Weight loss meds (phentermine/vyvanse): no   Iron supplement (RX/OTC): no   Supplements: yes  Height & Weight/BMI: 5'6\"/239lbs/38  Hx of Cardiac/CVA issues/(MI/Stroke): no   Devices Pacemaker/Defibrillator/Stents: no   Resp. Issues/Oxygen Use/RONALDO/COPD: no   Issues w/Anesthesia: issues with paralytic agents; ALLERGY SUCCINYLCHOLINE     Symptoms (Y/N): no    Family history of colon cancer: no    Medications, pharmacy, and allergies verified with patient over the phone. Please advise on orders and prep, thank you.

## 2022-06-22 NOTE — PROGRESS NOTES
GI clinical staff-     Please obtain trulicity orders per endocrine. Patient is scheduled for cln on Friday, 8/5/2022 w/Dr. Jas Kasper. Per pt. Injections are done q Thursday. May close TE when appropriate. Thank you.

## 2022-06-22 NOTE — PROGRESS NOTES
Scheduled for:  Colonoscopy-screen 20205    Provider Name:  Dr. Chris Wetzel  Date:  8/5/2022  Location:  [facility]  Sedation:  IV  Time:  9:45 AM (pt is aware to arrive at 8:45 AM)  Prep:  Split dose golytely   Meds/Allergies Reconciled?:  Physician reviewed  Diagnosis with codes:  Colorectal cancer screening Z12.11  Was patient informed to call insurance with codes (Y/N):  I confirmed [employer] with this patient. Referral sent?:  Referral was sent at the time of electronic surgical scheduling. [facility] or [address] notified?:  I sent an electronic request to Endo Scheduling and received a confirmation today. Medication Orders:  HOLD METFORMIN THE DAY PRIOR TO PROCEDURE; TRULICITY PER ENDOCRINE   Misc Orders:  Patient was informed that they will need a COVID 19 test prior to their procedure. Patient verbally understood & will await a phone call from [facility] to schedule. Further instructions given by staff:  I discussed the prep instructions with the patient which she verbally understood and is aware that I will send the instructions today via [address]

## 2022-06-22 NOTE — PROGRESS NOTES
I spoke to the pt and [facility] her endocrinologist Dr Ranjit Del Rio from [facility] and ask her how she should take her Trulicity prior to her colonoscopy.      Pt will message us back when she finds out the answer

## 2022-06-30 ENCOUNTER — HOSPITAL ENCOUNTER (OUTPATIENT)
Dept: MAMMOGRAPHY | Facility: HOSPITAL | Age: 47
Discharge: HOME | End: 2022-06-30
Attending: INTERNAL MEDICINE
Payer: COMMERCIAL

## 2022-06-30 ENCOUNTER — HOSPITAL ENCOUNTER (OUTPATIENT)
Dept: ULTRASOUND IMAGING | Facility: HOSPITAL | Age: 47
Discharge: HOME | End: 2022-06-30
Attending: INTERNAL MEDICINE
Payer: COMMERCIAL

## 2022-06-30 DIAGNOSIS — R92.8 ABNORMAL MAMMOGRAM: ICD-10-CM

## 2022-06-30 PROCEDURE — 77065 DX MAMMO INCL CAD UNI: CPT | Performed by: INTERNAL MEDICINE

## 2022-06-30 PROCEDURE — 77061 BREAST TOMOSYNTHESIS UNI: CPT | Performed by: INTERNAL MEDICINE

## 2022-06-30 PROCEDURE — 76642 ULTRASOUND BREAST LIMITED: CPT | Performed by: INTERNAL MEDICINE

## 2022-07-07 ENCOUNTER — HOSPITAL ENCOUNTER (OUTPATIENT)
Dept: MAMMOGRAPHY | Facility: HOSPITAL | Age: 47
Discharge: HOME | End: 2022-07-07
Attending: INTERNAL MEDICINE
Payer: COMMERCIAL

## 2022-07-07 DIAGNOSIS — N64.89 DISTORTION OF CONTOUR OF BREAST: ICD-10-CM

## 2022-07-07 PROCEDURE — 88305 TISSUE EXAM BY PATHOLOGIST: CPT | Performed by: INTERNAL MEDICINE

## 2022-07-07 PROCEDURE — 19081 BX BREAST 1ST LESION STRTCTC: CPT | Performed by: INTERNAL MEDICINE

## 2022-07-07 NOTE — PROCEDURES
[facility]  Procedure Note    January Zheng Patient Status:  Outpatient    1975 MRN [id number]   Location [address] [place] [address] Attending Kelli Castanon MD   Hosp Day # 0 PCP Nava Burger MD     Procedure: tomosynthesis guided biopsy of the left breast    Pre-Procedure Diagnosis:  Left breast slightly lateral AD    Post-Procedure Diagnosis: Left breast slightly lateral AD    Anesthesia:  Local    Findings:  Left breast slightly lateral AD    Specimens: 6    Blood Loss:  minimal    Tourniquet Time: none  Complications:  None  Drains:  None    Charanjit Freitas DO  2022

## 2022-07-12 ENCOUNTER — PATIENT MESSAGE (OUTPATIENT)
Dept: INTERNAL MEDICINE CLINIC | Facility: CLINIC | Age: 47
End: 2022-07-12

## 2022-07-12 ENCOUNTER — TELEPHONE (OUTPATIENT)
Dept: INTERNAL MEDICINE CLINIC | Facility: CLINIC | Age: 47
End: 2022-07-12

## 2022-07-12 LAB — AMB EXT COVID-19 RESULT: DETECTED

## 2022-07-13 ENCOUNTER — LAB ENCOUNTER (OUTPATIENT)
Dept: LAB | Age: 47
End: 2022-07-13
Attending: INTERNAL MEDICINE
Payer: COMMERCIAL

## 2022-07-13 ENCOUNTER — TELEMEDICINE (OUTPATIENT)
Dept: INTERNAL MEDICINE CLINIC | Facility: CLINIC | Age: 47
End: 2022-07-13

## 2022-07-13 DIAGNOSIS — U07.1 COVID: Primary | ICD-10-CM

## 2022-07-13 DIAGNOSIS — Z11.52 ENCOUNTER FOR SCREENING FOR COVID-19: ICD-10-CM

## 2022-07-13 PROCEDURE — 99213 OFFICE O/P EST LOW 20 MIN: CPT | Performed by: NURSE PRACTITIONER

## 2022-07-13 NOTE — TELEPHONE ENCOUNTER
From: Jj Joseph  To: Zaria Hernandez MD  Sent: 7/12/2022 9:36 PM CDT  Subject: Paxlovid    I just tested positive and friend suggested I see if you will prescribe Palovid? Home test result attached.

## 2022-07-13 NOTE — TELEPHONE ENCOUNTER
Patient returning phone call, requesting Paxlovid. Future Appointments   Date Time Provider Mars Powers   7/13/2022  2:20 PM TALIA Sims [facility]   8/5/2022  9:45 AM MA, PROCEDURE [id number] None       Patient scheduled for video visit. Patient advised to complete the e-check in in [employer], if active. Understands to follow the prompts and links to complete the visit. Patient advised that there may be a co-pay involved in this type of visit. Patient agreed to proceed, they understand the provider may be calling from a blocked, or unknown phone number on their caller ID and they know to answer the phone.     Best call back:  [phone]

## 2022-07-13 NOTE — TELEPHONE ENCOUNTER
----- Message from [address] [place] [address]. Kulwant Anaya sent at 7/12/2022  9:36 PM CDT -----  Regarding: Paxlovid  I just tested positive and friend suggested I see if you will prescribe Palovid? Home test result attached.

## 2022-07-14 LAB — SARS-COV-2 RNA RESP QL NAA+PROBE: DETECTED

## 2022-07-28 NOTE — PROGRESS NOTES
I called & spoke to the pt.  She spoke to her endocrinologist.    She is going to take her Trulicity after the procedure on 08/05/2022

## 2022-08-01 ENCOUNTER — TELEPHONE (OUTPATIENT)
Dept: GASTROENTEROLOGY | Facility: CLINIC | Age: 47
End: 2022-08-01

## 2022-08-01 DIAGNOSIS — Z12.11 COLON CANCER SCREENING: Primary | ICD-10-CM

## 2022-08-01 NOTE — TELEPHONE ENCOUNTER
Procedure has been rescheduled per covid protocol patient is aware of revised prep instructions to be sent via my chart case changed in endo and epic         Rescheduled for:  Colonoscopy-screen 19350    Provider Name:  Dr. Ventura Favorite  Date: from 8/5/2022                     To 09/10/2022   Location:  [facility]  Sedation: from  IV                             To MAC sedation  Time:from 9:45                         To 900 (pt is aware to arrive at 8:45 AM)  Prep:  Split dose golytely   Meds/Allergies Reconciled?:  Physician reviewed  Diagnosis with codes:  Colorectal cancer screening Z12.11  Was patient informed to call insurance with codes (Y/N):  I confirmed [place] with this patient. Referral sent?:  Referral was sent at the time of electronic surgical scheduling. [facility] or [address] notified?:  I sent an electronic request to Endo Scheduling and received a confirmation today. Medication Orders:  HOLD METFORMIN THE DAY PRIOR TO PROCEDURE; TRULICITY PER ENDOCRINE   Misc Orders:  Patient was informed that they will need a COVID 19 test prior to their procedure. Patient verbally understood & will await a phone call from [facility] to schedule. Further instructions given by staff:  I discussed the prep instructions with the patient which she verbally understood and is aware that I will send the instructions today via [address]

## 2022-08-10 ENCOUNTER — MED REC SCAN ONLY (OUTPATIENT)
Dept: INTERNAL MEDICINE CLINIC | Facility: CLINIC | Age: 47
End: 2022-08-10

## 2022-09-09 ENCOUNTER — ANESTHESIA EVENT (OUTPATIENT)
Dept: ENDOSCOPY | Facility: HOSPITAL | Age: 47
End: 2022-09-09
Payer: COMMERCIAL

## 2022-09-10 ENCOUNTER — HOSPITAL ENCOUNTER (OUTPATIENT)
Facility: HOSPITAL | Age: 47
Setting detail: HOSPITAL OUTPATIENT SURGERY
Discharge: HOME | End: 2022-09-10
Attending: INTERNAL MEDICINE | Admitting: INTERNAL MEDICINE
Payer: COMMERCIAL

## 2022-09-10 ENCOUNTER — ANESTHESIA (OUTPATIENT)
Dept: ENDOSCOPY | Facility: HOSPITAL | Age: 47
End: 2022-09-10
Payer: COMMERCIAL

## 2022-09-10 VITALS
WEIGHT: 230 LBS | OXYGEN SATURATION: 99 % | HEART RATE: 98 BPM | BODY MASS INDEX: 36.96 KG/M2 | SYSTOLIC BLOOD PRESSURE: 111 MMHG | TEMPERATURE: 99 F | HEIGHT: 66 IN | RESPIRATION RATE: 15 BRPM | DIASTOLIC BLOOD PRESSURE: 86 MMHG

## 2022-09-10 DIAGNOSIS — Z12.11 COLON CANCER SCREENING: ICD-10-CM

## 2022-09-10 LAB — GLUCOSE BLDC GLUCOMTR-MCNC: 122 MG/DL (ref 70–99)

## 2022-09-10 PROCEDURE — 0DJD8ZZ INSPECTION OF LOWER INTESTINAL TRACT, VIA NATURAL OR ARTIFICIAL OPENING ENDOSCOPIC: ICD-10-PCS | Performed by: INTERNAL MEDICINE

## 2022-09-10 PROCEDURE — 45378 DIAGNOSTIC COLONOSCOPY: CPT | Performed by: INTERNAL MEDICINE

## 2022-09-10 RX ORDER — SODIUM CHLORIDE, SODIUM LACTATE, POTASSIUM CHLORIDE, CALCIUM CHLORIDE 600; 310; 30; 20 MG/100ML; MG/100ML; MG/100ML; MG/100ML
INJECTION, SOLUTION INTRAVENOUS CONTINUOUS
Status: DISCONTINUED | OUTPATIENT
Start: 2022-09-10 | End: 2022-09-10

## 2022-09-10 RX ORDER — LIDOCAINE HYDROCHLORIDE 10 MG/ML
INJECTION, SOLUTION EPIDURAL; INFILTRATION; INTRACAUDAL; PERINEURAL AS NEEDED
Status: DISCONTINUED | OUTPATIENT
Start: 2022-09-10 | End: 2022-09-10 | Stop reason: SURG

## 2022-09-10 RX ADMIN — LIDOCAINE HYDROCHLORIDE 50 MG: 10 INJECTION, SOLUTION EPIDURAL; INFILTRATION; INTRACAUDAL; PERINEURAL at 08:53:00

## 2022-09-10 RX ADMIN — SODIUM CHLORIDE, SODIUM LACTATE, POTASSIUM CHLORIDE, CALCIUM CHLORIDE: 600; 310; 30; 20 INJECTION, SOLUTION INTRAVENOUS at 09:12:00

## 2022-09-10 NOTE — PRE-SEDATION ASSESSMENT
Physician Pre-Sedation Assessment    Pre-Sedation Assessment:    Sedation History: Previous Sedation with No Complications and Airway Assessed    Cardiac: normal S1, S2  Respiratory: breath sounds clear bilaterally   Abdomen: soft, BS (+), non-tender    ASA Classification: 1.  Normal healthy patient    Plan: mac Sedation

## 2022-09-10 NOTE — ANESTHESIA POSTPROCEDURE EVALUATION
Patient: Roslyn Lisa    Procedure Summary     Date: 09/10/22 Room / Location: [address] [place] [address] ENDOSCOPY 01 / [facility] ENDOSCOPY    Anesthesia Start: 8729 Anesthesia Stop: 1089    Procedure: COLONOSCOPY (N/A ) Diagnosis:       Colon cancer screening      (normal,)    Surgeons:  Gisselle Farah MD Anesthesiologist: Ruel Gill CRNA    Anesthesia Type: MAC ASA Status: 3          Anesthesia Type: MAC    Vitals Value Taken Time   /83 09/10/22 0916   Temp 97 09/10/22 0916   Pulse 73 09/10/22 0916   Resp 16 09/10/22 0916   SpO2 98 09/10/22 0916       [facility] AN Post Evaluation:   Patient Evaluated in PACU  Patient Participation: complete - patient participated  Level of Consciousness: awake  Pain Score: 0  Pain Management: adequate  Airway Patency:patent  Dental exam unchanged from preop  Yes    Cardiovascular Status: acceptable  Respiratory Status: acceptable  Postoperative Hydration acceptable      Joel Martel CRNA  9/10/2022 9:16 AM

## 2022-09-12 ENCOUNTER — MED REC SCAN ONLY (OUTPATIENT)
Dept: INTERNAL MEDICINE CLINIC | Facility: CLINIC | Age: 47
End: 2022-09-12

## 2023-02-09 ENCOUNTER — HOSPITAL ENCOUNTER (OUTPATIENT)
Age: 48
Discharge: HOME | End: 2023-02-09
Payer: COMMERCIAL

## 2023-02-09 VITALS
RESPIRATION RATE: 18 BRPM | TEMPERATURE: 97 F | HEART RATE: 86 BPM | OXYGEN SATURATION: 100 % | SYSTOLIC BLOOD PRESSURE: 153 MMHG | DIASTOLIC BLOOD PRESSURE: 93 MMHG | WEIGHT: 230 LBS | HEIGHT: 66 IN | BODY MASS INDEX: 36.96 KG/M2

## 2023-02-09 DIAGNOSIS — Z20.822 ENCOUNTER FOR LABORATORY TESTING FOR COVID-19 VIRUS: Primary | ICD-10-CM

## 2023-02-09 DIAGNOSIS — J01.00 ACUTE MAXILLARY SINUSITIS, RECURRENCE NOT SPECIFIED: ICD-10-CM

## 2023-02-09 LAB — SARS-COV-2 RNA RESP QL NAA+PROBE: NOT DETECTED

## 2023-02-09 PROCEDURE — U0002 COVID-19 LAB TEST NON-CDC: HCPCS | Performed by: PHYSICIAN ASSISTANT

## 2023-02-09 PROCEDURE — 99213 OFFICE O/P EST LOW 20 MIN: CPT | Performed by: PHYSICIAN ASSISTANT

## 2023-02-09 RX ORDER — AMOXICILLIN AND CLAVULANATE POTASSIUM 875; 125 MG/1; MG/1
1 TABLET, FILM COATED ORAL 2 TIMES DAILY
Qty: 20 TABLET | Refills: 0 | Status: SHIPPED | OUTPATIENT
Start: 2023-02-09 | End: 2023-02-19

## 2023-02-09 NOTE — ED INITIAL ASSESSMENT (HPI)
[facility]  [facility]   Patient Name: Chuck Fink  : 1965  MRN: [id number]  Primary Care Physician:  Wu Reyes DO  Date of admission: 2023    Subjective   Subjective     Chief Complaint: Generalized weakness times several months    HPI:    Chuck Fink is a 57 y.o. male with medical history significant for hypertension, bipolar disorder; presents with generalized weakness times several months.  Patient states that his weakness started several months ago when he believes that he may have contracted COVID-19 the delta version.  Patient states he had been working with his family members who ended up testing positive for COVID.  Patient states that he never tested for COVID at that time, but noted that he did have a loss of taste.  Patient states that since he had his loss of taste, he has not had much of an appetite.  Patient states that since his loss of appetite, he is had increasing generalized weakness from not eating.  Patient notes that in the past few months since, he contracted influenza A/B.  Patient states that this continued to contribute to his weakness.  Patient has noted that over the past 2 weeks he has noted increasing weakness and states that he has had to use a cane in the past week in order to get around.  Patient has also developed tremors in his hands and is currently scheduled to see a neurologist.  While in the hospital, patient has developed fevers.  Patient tested positive for COVID-19.  Patient reports having chest pain from time to time.  Patient is also reported having diarrhea on and off.  Patient has chronic back and neck pain.  As stated above, patient has been using a cane in order to get around.  No cough, no shortness of breath, no wheezing, no URI or UTI type symptoms, no confusion, no agitation.      Personal History     Past Medical History:  Past Medical History:   Diagnosis Date   • Bipolar affective disorder  Pt in [address] [place] [address] for concern of sinus infection x 1.5 wk. States having pressure to head around forehead and cheek area from sinus, +cough. States had been using nettipot and cough medication w some relief but not getting worse since Sunday. (HCC)    • Chest pain    • Headache    • Hypertension    • Schizo affective schizophrenia (HCC)    • Sleep apnea         Past Surgical History:  Past Surgical History:   Procedure Laterality Date   • APPENDECTOMY     • COLONOSCOPY     • COLONOSCOPY N/A 2022    Procedure: COLONOSCOPY;  Surgeon: Tom Miller MD;  Location: [facility] ENDOSCOPY;  Service: Gastroenterology;  Laterality: N/A;  INADEQUATE PREP   • ENDOSCOPY N/A 2022    Procedure: ESOPHAGOGASTRODUODENOSCOPY WITH BX;  Surgeon: Tom Miller MD;  Location: [facility] ENDOSCOPY;  Service: Gastroenterology;  Laterality: N/A;  NORMAL EGD        Family History:   Family History   Problem Relation Age of Onset   • Heart attack Father    • Colon cancer Neg Hx         Social History:   Social History     Socioeconomic History   • Marital status:    Tobacco Use   • Smoking status: Former     Types: Cigars     Quit date: 2022     Years since quittin.6   • Smokeless tobacco: Never   Vaping Use   • Vaping Use: Never used   Substance and Sexual Activity   • Alcohol use: Never   • Drug use: Never   • Sexual activity: Defer        Home Medications:  Prior to Admission medications    Medication Sig Start Date End Date Taking? Authorizing Provider   allopurinol (ZYLOPRIM) 300 MG tablet Take 300 mg by mouth Daily.    Brett Nichols MD   ALPRAZolam (XANAX) 0.5 MG tablet Take 0.5 mg by mouth Daily. 21   Brett Nichols MD   dicyclomine (BENTYL) 20 MG tablet Take 1 tablet by mouth Every 6 (Six) Hours. 22   Esther Zuñiga APRN   lithium carbonate 300 MG capsule take 2 capsules every morning and 3 at bedtime 17   Brett Nichols MD   mirtazapine (REMERON SOL-TAB) 15 MG disintegrating tablet Place 15 mg on the tongue Every Night.    Brett Nichols MD   omeprazole (priLOSEC) 40 MG capsule Take 40 mg by mouth Daily.    Brett Nichols MD   ondansetron ODT (ZOFRAN-ODT) 4 MG disintegrating tablet  Place 1 tablet on the tongue Every 4 (Four) Hours As Needed for Nausea or Vomiting. 9/1/22   Josselin Montes De Oca APRN   promethazine (PHENERGAN) 25 MG tablet Take 1 tablet by mouth Every 6 (Six) Hours As Needed for Nausea or Vomiting. 9/9/22   Esther Zuñiga APRN   indomethacin (INDOCIN) 25 MG capsule Take 25 mg by mouth Daily.  1/22/23  Provider, MD Brett   tamsulosin (FLOMAX) 0.4 MG capsule 24 hr capsule  8/31/22 1/22/23  Provider, MD Brett        Allergies:  No Known Allergies    Review of Systems  Review of Systems   Constitutional: Positive for activity change, appetite change, chills, fatigue and fever.   HENT: Negative for congestion, ear pain, postnasal drip, rhinorrhea, sinus pressure, sinus pain, sneezing and sore throat.    Eyes: Negative for itching.   Respiratory: Negative for cough, shortness of breath and wheezing.    Cardiovascular: Positive for chest pain. Negative for leg swelling.   Gastrointestinal: Positive for diarrhea. Negative for abdominal pain, constipation, nausea and vomiting.   Endocrine: Negative for polydipsia and polyuria.   Genitourinary: Negative for decreased urine volume, difficulty urinating, dysuria, flank pain, frequency and urgency.   Musculoskeletal: Positive for back pain, gait problem, myalgias and neck pain.   Neurological: Positive for weakness. Negative for dizziness and headaches.   Psychiatric/Behavioral: Negative for agitation and confusion.           Objective   Objective     Vitals:   Temp:  [99.2 °F (37.3 °C)-101.3 °F (38.5 °C)] 99.2 °F (37.3 °C)  Heart Rate:  [64-83] 67  Resp:  [18] 18  BP: ()/(47-71) 85/47    Physical Exam   Physical Exam  Constitutional:       Appearance: Normal appearance.   HENT:      Head: Normocephalic and atraumatic.      Right Ear: External ear normal.      Left Ear: External ear normal.      Nose: Nose normal.      Mouth/Throat:      Pharynx: Oropharynx is clear.   Eyes:      Extraocular Movements: Extraocular movements  intact.      Pupils: Pupils are equal, round, and reactive to light.   Cardiovascular:      Rate and Rhythm: Normal rate and regular rhythm.      Pulses: Normal pulses.      Heart sounds: Normal heart sounds.   Pulmonary:      Effort: Pulmonary effort is normal.      Breath sounds: Normal breath sounds.   Abdominal:      General: Abdomen is flat.      Palpations: Abdomen is soft.   Genitourinary:     Penis: Normal.       Testes: Normal.   Musculoskeletal:         General: Normal range of motion.      Cervical back: Normal range of motion and neck supple.   Skin:     General: Skin is warm and dry.      Capillary Refill: Capillary refill takes less than 2 seconds.   Neurological:      General: No focal deficit present.      Mental Status: He is alert and oriented to person, place, and time.          Result Review    Result Review:  Glucose   Date Value Ref Range Status   01/22/2023 160 (H) 65 - 99 mg/dL Final     BUN   Date Value Ref Range Status   01/22/2023 2 (L) 6 - 20 mg/dL Final     Creatinine   Date Value Ref Range Status   01/22/2023 1.10 0.76 - 1.27 mg/dL Final     Sodium   Date Value Ref Range Status   01/22/2023 139 136 - 145 mmol/L Final     Potassium   Date Value Ref Range Status   01/22/2023 3.3 (L) 3.5 - 5.2 mmol/L Final     Chloride   Date Value Ref Range Status   01/22/2023 106 98 - 107 mmol/L Final     CO2   Date Value Ref Range Status   01/22/2023 23.1 22.0 - 29.0 mmol/L Final     Calcium   Date Value Ref Range Status   01/22/2023 10.0 8.6 - 10.5 mg/dL Final     Total Protein   Date Value Ref Range Status   01/22/2023 7.2 6.0 - 8.5 g/dL Final     Albumin   Date Value Ref Range Status   01/22/2023 4.3 3.5 - 5.2 g/dL Final     ALT (SGPT)   Date Value Ref Range Status   01/22/2023 66 (H) 1 - 41 U/L Final     AST (SGOT)   Date Value Ref Range Status   01/22/2023 76 (H) 1 - 40 U/L Final     Alkaline Phosphatase   Date Value Ref Range Status   01/22/2023 128 (H) 39 - 117 U/L Final     Total Bilirubin    Date Value Ref Range Status   01/22/2023 0.5 0.0 - 1.2 mg/dL Final     BUN/Creatinine Ratio   Date Value Ref Range Status   01/22/2023 1.8 (L) 7.0 - 25.0 Final     Anion Gap   Date Value Ref Range Status   01/22/2023 9.9 5.0 - 15.0 mmol/L Final      WBC   Date Value Ref Range Status   01/22/2023 9.05 3.40 - 10.80 10*3/mm3 Final     RBC   Date Value Ref Range Status   01/22/2023 3.63 (L) 4.14 - 5.80 10*6/mm3 Final     Hemoglobin   Date Value Ref Range Status   01/22/2023 11.8 (L) 13.0 - 17.7 g/dL Final     Hematocrit   Date Value Ref Range Status   01/22/2023 35.5 (L) 37.5 - 51.0 % Final     MCV   Date Value Ref Range Status   01/22/2023 97.8 (H) 79.0 - 97.0 fL Final     MCH   Date Value Ref Range Status   01/22/2023 32.5 26.6 - 33.0 pg Final     MCHC   Date Value Ref Range Status   01/22/2023 33.2 31.5 - 35.7 g/dL Final     RDW   Date Value Ref Range Status   01/22/2023 14.8 12.3 - 15.4 % Final     RDW-SD   Date Value Ref Range Status   01/22/2023 53.1 37.0 - 54.0 fl Final     MPV   Date Value Ref Range Status   01/22/2023 9.1 6.0 - 12.0 fL Final     Platelets   Date Value Ref Range Status   01/22/2023 290 140 - 450 10*3/mm3 Final     Neutrophil %   Date Value Ref Range Status   01/22/2023 87.7 (H) 42.7 - 76.0 % Final     Lymphocyte %   Date Value Ref Range Status   01/22/2023 3.8 (L) 19.6 - 45.3 % Final     Monocyte %   Date Value Ref Range Status   01/22/2023 5.9 5.0 - 12.0 % Final     Eosinophil %   Date Value Ref Range Status   01/22/2023 1.1 0.3 - 6.2 % Final     Basophil %   Date Value Ref Range Status   01/22/2023 0.9 0.0 - 1.5 % Final     Immature Grans %   Date Value Ref Range Status   01/22/2023 0.6 (H) 0.0 - 0.5 % Final     Neutrophils, Absolute   Date Value Ref Range Status   01/22/2023 7.95 (H) 1.70 - 7.00 10*3/mm3 Final     Lymphocytes, Absolute   Date Value Ref Range Status   01/22/2023 0.34 (L) 0.70 - 3.10 10*3/mm3 Final     Monocytes, Absolute   Date Value Ref Range Status   01/22/2023 0.53 0.10 -  0.90 10*3/mm3 Final     Eosinophils, Absolute   Date Value Ref Range Status   01/22/2023 0.10 0.00 - 0.40 10*3/mm3 Final     Basophils, Absolute   Date Value Ref Range Status   01/22/2023 0.08 0.00 - 0.20 10*3/mm3 Final     Immature Grans, Absolute   Date Value Ref Range Status   01/22/2023 0.05 0.00 - 0.05 10*3/mm3 Final     nRBC   Date Value Ref Range Status   01/22/2023 0.0 0.0 - 0.2 /100 WBC Final      UA    Urinalysis 8/31/22 1/22/23   Specific Gravity, UA <=1.005 <=1.005   Ketones, UA Negative Negative   Blood, UA Negative Negative   Leukocytes, UA Negative Negative   Nitrite, UA Negative Negative              Imaging:  XR Chest 1 View    Result Date: 1/22/2023  PROCEDURE: XR CHEST 1 VW  COMPARISON: None  INDICATIONS: WEAKNESS, FEVER  FINDINGS:  Lordotic positioning.  Heart size and pulmonary vasculature within normal limits.  Mild atelectasis in the left base.  No suspicious infiltrate.  Costophrenic angles sharp       No acute cardiopulmonary process       KODAK ALICEA MD       Electronically Signed and Approved By: KODAK ALICEA MD on 1/22/2023 at 17:02                  Assessment & Plan   Assessment / Plan     Active Hospital Problems    Diagnosis  POA   • **Fever due to infection [B99.9]  Yes     Priority: High   • COVID-19 virus infection [U07.1]  Yes     Priority: High   • Generalized weakness [R53.1]  Yes     Priority: High   • Hypotension [I95.9]  Yes     Priority: High   • Hypertension, essential [I10]  Yes     Priority: Low        Assessment/Plan:   COVID-19- patient presents with generalized weakness x2 weeks.  Patient also noted to be febrile during hospitalization.  Patient came back positive for COVID-19.  Patient's symptoms most likely secondary to COVID-19 infection.  Patient noted not to be hypoxic at this time.  Patient will be placed on vitamin C, vitamin D and zinc.  We will follow inflammatory markers during hospitalization.  Generalized weakness-patient reports having generalized  weakness and states that he has been feeling weak for several months.  Patient notes that he has had COVID-19 in the past along with influenza A and influenza B.  Patient appears to have had significant decline in his physical conditioning secondary to having had several viral illnesses over the past few months.  Patient is not interested in going to a SNF at this time.  Patient is requesting to have home health if possible.  We will get  consult for home health.  Hypotension-patient noted to be hypotensive in the ED.  Patient given a couple liters of IV fluids.  Patient most likely hypotensive secondary to having underlying COVID-19 infection.  Patient currently receiving IV fluid hydration.  Patient's blood pressure will be monitored through hospitalization.  Hypertension-patient has a history of hypertension.  Patient is not noted to be on any blood pressure medications at this time.  Patient's blood pressure will be monitored throughout hospitalization.      DVT prophylaxis:  Medical DVT prophylaxis orders are present.    CODE STATUS:    Level Of Support Discussed With: Patient  Code Status (Patient has no pulse and is not breathing): CPR (Attempt to Resuscitate)  Medical Interventions (Patient has pulse or is breathing): Full Support  Release to patient: Routine Release      Admission Status:  I believe this patient meets observation status.    Electronically signed by Tracy Godinez MD, 01/22/23, 9:37 PM EST.

## 2023-03-09 ENCOUNTER — TELEMEDICINE (OUTPATIENT)
Dept: INTERNAL MEDICINE CLINIC | Facility: CLINIC | Age: 48
End: 2023-03-09

## 2023-03-09 DIAGNOSIS — U07.1 COVID-19: Primary | ICD-10-CM

## 2023-03-09 PROCEDURE — 99213 OFFICE O/P EST LOW 20 MIN: CPT | Performed by: NURSE PRACTITIONER

## 2023-03-14 ENCOUNTER — PATIENT MESSAGE (OUTPATIENT)
Dept: INTERNAL MEDICINE CLINIC | Facility: CLINIC | Age: 48
End: 2023-03-14

## 2023-03-14 LAB — AMB EXT EGFR NON-AA: 76

## 2023-03-14 PROCEDURE — 3044F HG A1C LEVEL LT 7.0%: CPT | Performed by: NURSE PRACTITIONER

## 2023-03-15 NOTE — TELEPHONE ENCOUNTER
From: Singh Gustafson  To: Jose Alberto Escamilla MD  Sent: 3/14/2023 4:09 PM CDT  Subject: Sleep Aid    We are going on vacation next week and I am nortorious for my inability to sleep when not at home. I have tried melatonin, sleepy teas, etc. But for whatever reason I just am axnious and will not sleep for more than a few hours. So I wantes to see if you could prescribe just a few somethings to help? I would only want a few (2-3) so I have something every few days so I can get some decent sleep. Happy to set up call too, I just wanted to be mindful of your time.

## 2023-03-20 ENCOUNTER — TELEMEDICINE (OUTPATIENT)
Dept: INTERNAL MEDICINE CLINIC | Facility: CLINIC | Age: 48
End: 2023-03-20

## 2023-03-20 DIAGNOSIS — G47.00 INSOMNIA, UNSPECIFIED TYPE: Primary | ICD-10-CM

## 2023-03-20 RX ORDER — ZOLPIDEM TARTRATE 5 MG/1
5 TABLET ORAL NIGHTLY PRN
Qty: 7 TABLET | Refills: 0 | Status: SHIPPED | OUTPATIENT
Start: 2023-03-20

## 2023-03-21 ENCOUNTER — OFFICE VISIT (OUTPATIENT)
Dept: OTOLARYNGOLOGY | Facility: CLINIC | Age: 48
End: 2023-03-21

## 2023-03-21 VITALS — TEMPERATURE: 98 F | WEIGHT: 220 LBS | BODY MASS INDEX: 35.36 KG/M2 | HEIGHT: 66 IN

## 2023-03-21 DIAGNOSIS — J32.9 CHRONIC SINUSITIS, UNSPECIFIED LOCATION: Primary | ICD-10-CM

## 2023-03-21 PROCEDURE — 99204 OFFICE O/P NEW MOD 45 MIN: CPT | Performed by: STUDENT IN AN ORGANIZED HEALTH CARE EDUCATION/TRAINING PROGRAM

## 2023-03-21 PROCEDURE — 3008F BODY MASS INDEX DOCD: CPT | Performed by: STUDENT IN AN ORGANIZED HEALTH CARE EDUCATION/TRAINING PROGRAM

## 2023-03-21 PROCEDURE — 31231 NASAL ENDOSCOPY DX: CPT | Performed by: STUDENT IN AN ORGANIZED HEALTH CARE EDUCATION/TRAINING PROGRAM

## 2023-04-03 ENCOUNTER — PATIENT MESSAGE (OUTPATIENT)
Dept: OTOLARYNGOLOGY | Facility: CLINIC | Age: 48
End: 2023-04-03

## 2023-04-06 NOTE — TELEPHONE ENCOUNTER
From: Rupal Fountain  To: Manjula Jamison MD  Sent: 4/3/2023 10:37 AM CDT  Subject: Test results    Thank you for the message on my test results. I am pleased to report that my sinuses have cleared up. We spent spring break in [place] doing a ton of ocean activities and that did the trick as I was feel fine by day 2. Do you think there could be an environmental element to what was growing? I keep a clean house, but maybe I need to test for hidden mold or something? Just a thought. I think [place] can heal a lot of issues, so I could just be the power of mother nature.     Fabio Dobson

## 2023-05-11 ENCOUNTER — OFFICE VISIT (OUTPATIENT)
Dept: INTERNAL MEDICINE CLINIC | Facility: CLINIC | Age: 48
End: 2023-05-11

## 2023-05-11 VITALS
HEART RATE: 108 BPM | BODY MASS INDEX: 41.3 KG/M2 | SYSTOLIC BLOOD PRESSURE: 150 MMHG | WEIGHT: 257 LBS | DIASTOLIC BLOOD PRESSURE: 83 MMHG | TEMPERATURE: 101 F | HEIGHT: 66 IN | RESPIRATION RATE: 16 BRPM

## 2023-05-11 DIAGNOSIS — J02.0 STREP PHARYNGITIS: ICD-10-CM

## 2023-05-11 DIAGNOSIS — J02.9 SORE THROAT: Primary | ICD-10-CM

## 2023-05-11 LAB
CONTROL LINE PRESENT WITH A CLEAR BACKGROUND (YES/NO): YES YES/NO
KIT LOT #: NORMAL NUMERIC
STREP GRP A CUL-SCR: POSITIVE

## 2023-05-11 PROCEDURE — 3079F DIAST BP 80-89 MM HG: CPT | Performed by: NURSE PRACTITIONER

## 2023-05-11 PROCEDURE — 3077F SYST BP >= 140 MM HG: CPT | Performed by: NURSE PRACTITIONER

## 2023-05-11 PROCEDURE — 3008F BODY MASS INDEX DOCD: CPT | Performed by: NURSE PRACTITIONER

## 2023-05-11 PROCEDURE — 99213 OFFICE O/P EST LOW 20 MIN: CPT | Performed by: NURSE PRACTITIONER

## 2023-05-11 PROCEDURE — 87880 STREP A ASSAY W/OPTIC: CPT | Performed by: NURSE PRACTITIONER

## 2023-05-11 RX ORDER — AMOXICILLIN AND CLAVULANATE POTASSIUM 875; 125 MG/1; MG/1
1 TABLET, FILM COATED ORAL 2 TIMES DAILY
Qty: 20 TABLET | Refills: 0 | Status: SHIPPED | OUTPATIENT
Start: 2023-05-11 | End: 2023-05-21

## 2023-05-11 NOTE — PATIENT INSTRUCTIONS
Take antibiotics as prescribed     Increase fluid intake     Take Advil as needed for fever and throat discomfort

## 2023-05-16 ENCOUNTER — PATIENT MESSAGE (OUTPATIENT)
Dept: INTERNAL MEDICINE CLINIC | Facility: CLINIC | Age: 48
End: 2023-05-16

## 2023-05-16 NOTE — TELEPHONE ENCOUNTER
From: Soy Morrison  To: Mitra De Jesus MD  Sent: 5/16/2023 6:56 AM CDT  Subject: Maci Rubio you are well. I came into offices last Thursday, saw Tamia Morrow, and tested positive for strep. She prescribed amoxicillin which did help pretty quick with the fever and intense throat pain. However, here it is 5 days later, still on antibiotics, and I am still not well. No fever, headache, weak, manageable pain, but throat still swollen and I now have postules which I did not until this morning. I am worried that throat is now making postules and that I am not more improved. I emailed Cheyanne and she told me to make an appointment to see someone again. However, the earliest I am finding is next week. I cannot see you until June. Hope there is a way to advise since we already know what it is. If you can help.     Sly Bowens

## 2023-05-17 ENCOUNTER — OFFICE VISIT (OUTPATIENT)
Dept: INTERNAL MEDICINE CLINIC | Facility: CLINIC | Age: 48
End: 2023-05-17

## 2023-05-17 VITALS
DIASTOLIC BLOOD PRESSURE: 70 MMHG | OXYGEN SATURATION: 98 % | BODY MASS INDEX: 40.98 KG/M2 | TEMPERATURE: 98 F | HEIGHT: 66 IN | WEIGHT: 255 LBS | SYSTOLIC BLOOD PRESSURE: 124 MMHG | HEART RATE: 96 BPM

## 2023-05-17 DIAGNOSIS — J02.0 STREP PHARYNGITIS: Primary | ICD-10-CM

## 2023-05-17 DIAGNOSIS — R09.82 PND (POST-NASAL DRIP): ICD-10-CM

## 2023-05-17 DIAGNOSIS — E11.65 TYPE 2 DIABETES MELLITUS WITH HYPERGLYCEMIA, WITHOUT LONG-TERM CURRENT USE OF INSULIN (HCC): ICD-10-CM

## 2023-05-17 PROCEDURE — 3074F SYST BP LT 130 MM HG: CPT | Performed by: INTERNAL MEDICINE

## 2023-05-17 PROCEDURE — 3008F BODY MASS INDEX DOCD: CPT | Performed by: INTERNAL MEDICINE

## 2023-05-17 PROCEDURE — 3078F DIAST BP <80 MM HG: CPT | Performed by: INTERNAL MEDICINE

## 2023-05-17 PROCEDURE — 99214 OFFICE O/P EST MOD 30 MIN: CPT | Performed by: INTERNAL MEDICINE

## 2023-05-26 ENCOUNTER — OFFICE VISIT (OUTPATIENT)
Dept: OTOLARYNGOLOGY | Facility: CLINIC | Age: 48
End: 2023-05-26

## 2023-05-26 VITALS — TEMPERATURE: 97 F | WEIGHT: 255 LBS | BODY MASS INDEX: 40.98 KG/M2 | HEIGHT: 66 IN

## 2023-05-26 DIAGNOSIS — J32.9 CHRONIC SINUSITIS, UNSPECIFIED LOCATION: Primary | ICD-10-CM

## 2023-05-26 PROCEDURE — 87077 CULTURE AEROBIC IDENTIFY: CPT | Performed by: STUDENT IN AN ORGANIZED HEALTH CARE EDUCATION/TRAINING PROGRAM

## 2023-05-26 PROCEDURE — 87205 SMEAR GRAM STAIN: CPT | Performed by: STUDENT IN AN ORGANIZED HEALTH CARE EDUCATION/TRAINING PROGRAM

## 2023-05-26 PROCEDURE — 87070 CULTURE OTHR SPECIMN AEROBIC: CPT | Performed by: STUDENT IN AN ORGANIZED HEALTH CARE EDUCATION/TRAINING PROGRAM

## 2023-05-26 PROCEDURE — 87075 CULTR BACTERIA EXCEPT BLOOD: CPT | Performed by: STUDENT IN AN ORGANIZED HEALTH CARE EDUCATION/TRAINING PROGRAM

## 2023-05-26 RX ORDER — FLUCONAZOLE 200 MG/1
200 TABLET ORAL DAILY
Qty: 14 TABLET | Refills: 0 | Status: SHIPPED | OUTPATIENT
Start: 2023-05-26 | End: 2023-06-09

## 2023-06-01 ENCOUNTER — PATIENT MESSAGE (OUTPATIENT)
Dept: OTOLARYNGOLOGY | Facility: CLINIC | Age: 48
End: 2023-06-01

## 2023-06-01 NOTE — TELEPHONE ENCOUNTER
From: Jj Joseph  To: Lexie Chambers MD  Sent: 6/1/2023 10:00 AM CDT  Subject: Question regarding AEROBIC BACTERIAL CULTURE    So, not fungal? Not anything?
Please see note below, pt requesting results of culture in Epic.
Cardiac

## 2023-06-17 ENCOUNTER — PATIENT MESSAGE (OUTPATIENT)
Dept: INTERNAL MEDICINE CLINIC | Facility: CLINIC | Age: 48
End: 2023-06-17

## 2023-06-17 NOTE — TELEPHONE ENCOUNTER
Dr. Rupinder Iyer, please see [employer] message and attachtment. Thanks. Last physical 6/2022. [employer] sent the patient is due for physical. No future appointments.

## 2023-06-17 NOTE — TELEPHONE ENCOUNTER
From: Frances No  To: Parvin Boswell MD  Sent: 6/17/2023 8:58 AM CDT  Subject: Rising A1C    Can Dr. Jonny Escamilla help with diabetes care? My endocrinologist is proving way too hard to get ahold of. My A1C has spiked up. Even eating reallt clean, low carb, no gluten, no added sugars. Gold Anis keto plus some fruit, my blood augars are coming in super high (180 this morning) and I am not sure what is going on. Anyway, if she is able I want to switch to her to help me get this under control. I need some help. If you can advise.

## 2023-06-17 NOTE — TELEPHONE ENCOUNTER
Reviewed labs from 6/20/2023-A1c is very good at 6.8   I have placed a referral for her to see Marina Gray in 2021-she can make appointment to see them on an as-needed basis, no urgency  RN correct ,sheis due for her annual physcial and also I ordered a urine for her

## 2023-07-31 PROCEDURE — 3044F HG A1C LEVEL LT 7.0%: CPT | Performed by: INTERNAL MEDICINE

## 2023-08-26 ENCOUNTER — HOSPITAL ENCOUNTER (OUTPATIENT)
Age: 48
Discharge: HOME | End: 2023-08-26
Payer: COMMERCIAL

## 2023-08-26 VITALS
OXYGEN SATURATION: 98 % | TEMPERATURE: 97 F | HEART RATE: 82 BPM | RESPIRATION RATE: 16 BRPM | SYSTOLIC BLOOD PRESSURE: 92 MMHG | DIASTOLIC BLOOD PRESSURE: 54 MMHG

## 2023-08-26 DIAGNOSIS — S61.411A LACERATION OF RIGHT HAND WITHOUT FOREIGN BODY, INITIAL ENCOUNTER: Primary | ICD-10-CM

## 2023-09-05 ENCOUNTER — OFFICE VISIT (OUTPATIENT)
Dept: INTERNAL MEDICINE CLINIC | Facility: CLINIC | Age: 48
End: 2023-09-05

## 2023-09-05 ENCOUNTER — LAB ENCOUNTER (OUTPATIENT)
Dept: LAB | Age: 48
End: 2023-09-05
Attending: INTERNAL MEDICINE
Payer: COMMERCIAL

## 2023-09-05 VITALS
WEIGHT: 253 LBS | BODY MASS INDEX: 40.66 KG/M2 | DIASTOLIC BLOOD PRESSURE: 89 MMHG | SYSTOLIC BLOOD PRESSURE: 146 MMHG | HEIGHT: 66 IN | HEART RATE: 83 BPM

## 2023-09-05 DIAGNOSIS — E11.65 TYPE 2 DIABETES MELLITUS WITH HYPERGLYCEMIA, WITHOUT LONG-TERM CURRENT USE OF INSULIN (HCC): ICD-10-CM

## 2023-09-05 DIAGNOSIS — Z12.31 SCREENING MAMMOGRAM, ENCOUNTER FOR: ICD-10-CM

## 2023-09-05 DIAGNOSIS — Z00.00 PHYSICAL EXAM, ANNUAL: Primary | ICD-10-CM

## 2023-09-05 DIAGNOSIS — E06.3 HASHIMOTO'S THYROIDITIS: ICD-10-CM

## 2023-09-05 LAB
CREAT UR-SCNC: 44.8 MG/DL
MICROALBUMIN UR-MCNC: <0.5 MG/DL

## 2023-09-05 PROCEDURE — 3077F SYST BP >= 140 MM HG: CPT | Performed by: INTERNAL MEDICINE

## 2023-09-05 PROCEDURE — 3079F DIAST BP 80-89 MM HG: CPT | Performed by: INTERNAL MEDICINE

## 2023-09-05 PROCEDURE — 82570 ASSAY OF URINE CREATININE: CPT

## 2023-09-05 PROCEDURE — 82043 UR ALBUMIN QUANTITATIVE: CPT

## 2023-09-05 PROCEDURE — 99396 PREV VISIT EST AGE 40-64: CPT | Performed by: INTERNAL MEDICINE

## 2023-09-05 PROCEDURE — 3008F BODY MASS INDEX DOCD: CPT | Performed by: INTERNAL MEDICINE

## 2023-09-05 RX ORDER — PROGESTERONE 200 MG/1
200 CAPSULE ORAL NIGHTLY
COMMUNITY

## 2023-09-07 ENCOUNTER — HOSPITAL ENCOUNTER (OUTPATIENT)
Age: 48
Discharge: HOME | End: 2023-09-07
Payer: COMMERCIAL

## 2023-09-07 ENCOUNTER — NURSE TRIAGE (OUTPATIENT)
Dept: INTERNAL MEDICINE CLINIC | Facility: CLINIC | Age: 48
End: 2023-09-07

## 2023-09-07 VITALS
TEMPERATURE: 98 F | DIASTOLIC BLOOD PRESSURE: 85 MMHG | HEART RATE: 76 BPM | SYSTOLIC BLOOD PRESSURE: 146 MMHG | RESPIRATION RATE: 18 BRPM | OXYGEN SATURATION: 100 %

## 2023-09-07 DIAGNOSIS — M54.6 ACUTE RIGHT-SIDED THORACIC BACK PAIN: Primary | ICD-10-CM

## 2023-09-07 RX ORDER — METAXALONE 800 MG/1
800 TABLET ORAL 3 TIMES DAILY
Qty: 15 TABLET | Refills: 0 | Status: SHIPPED | OUTPATIENT
Start: 2023-09-07 | End: 2023-09-12

## 2023-09-07 RX ORDER — LIDOCAINE 50 MG/G
1 PATCH TOPICAL EVERY 24 HOURS
Qty: 7 PATCH | Refills: 0 | Status: SHIPPED | OUTPATIENT
Start: 2023-09-07 | End: 2023-09-14

## 2023-09-07 RX ORDER — KETOROLAC TROMETHAMINE 30 MG/ML
60 INJECTION, SOLUTION INTRAMUSCULAR; INTRAVENOUS ONCE
Status: COMPLETED | OUTPATIENT
Start: 2023-09-07 | End: 2023-09-07

## 2023-09-07 NOTE — DISCHARGE INSTRUCTIONS
Resume taking ibuprofen 600 mg starting tomorrow (Friday) morning    Take ibuprofen every 6-8 hours for the next 3 days no

## 2023-09-07 NOTE — ED INITIAL ASSESSMENT (HPI)
Pt reports right upper back pain upon waking. Took norco with moderate relief. Denies injury or trauma.

## 2023-09-07 NOTE — TELEPHONE ENCOUNTER
Action Requested: Summary for Provider     []  Critical Lab, Recommendations Needed  [] Need Additional Advice  []   FYI    []   Need Orders  [] Need Medications Sent to Pharmacy  []  Other     SUMMARY: Per protocol disposition advised see in office today. Patient states she is in severe pain and does not want to travel to have imaging, prefers immediate care. Patient states she will go to immediate care in [place]    Reason for call: Acute (Back pain)  Onset: yesterday    Patient reports she was moving things around in the garage yesterday and felt she strained her upper back when lifting something. She now reports severe pain that feels like a liz horse. She reports taking ibuprofen and using a heating pad with minimal relief.   Denies: abdominal pain, blood in urine, numbness, weakness, and protocol questions marked with \"no\"    Reason for Disposition   SEVERE back pain (e.g., excruciating, unable to do any normal activities) and not improved after pain medicine and CARE ADVICE    Protocols used: Back Pain-A-[place]

## 2023-09-08 DIAGNOSIS — F32.A DEPRESSION, UNSPECIFIED DEPRESSION TYPE: ICD-10-CM

## 2023-09-08 RX ORDER — ESCITALOPRAM OXALATE 10 MG/1
10 TABLET ORAL DAILY
Qty: 90 TABLET | Refills: 3 | Status: SHIPPED | OUTPATIENT
Start: 2023-09-08

## 2023-09-08 NOTE — TELEPHONE ENCOUNTER
Refill passed per [employer] [employer] protocol. Requested Prescriptions   Pending Prescriptions Disp Refills    escitalopram 10 MG Oral Tab 90 tablet 3     Sig: Take 1 tablet (10 mg total) by mouth daily.        Psychiatric Non-Scheduled (Anti-Anxiety) Passed - 9/8/2023 10:19 AM        Passed - In person appointment or virtual visit in the past 6 mos or appointment in next 3 mos     Recent Outpatient Visits              3 days ago Physical exam, annual    Joanne Reis MD    Office Visit    3 months ago Chronic sinusitis, unspecified location    [address] [facility], Cornelia Severs, MD    Office Visit    3 months ago Strep pharyngitis    Elbert Duran MD    Office Visit    4 months ago Sore throat    Gilmar Duran [place], APRN    Office Visit    5 months ago Chronic sinusitis, unspecified location    [address] [facility], Cornelia Severs, MD    Office Visit          Future Appointments         Provider Department Appt Notes    In 1 month ADO DEXA RM1; [facility] [address] [facility]     In 4 months Bi Navas MD [address],[facility] [address] [facility] Diabetes check up                  Future Appointments         Provider Department Appt Notes    In 1 month ADO DEXA RM1; ADO ANUP [address] [facility]     In 4 months Bi Navas MD [address],[facility] [address], [address] [facility] Diabetes check up          Recent Outpatient Visits              3 days ago Physical exam, mariana Reis MD    Office Visit    3 months ago Chronic sinusitis, unspecified location    [address] [facility], Cornelia Severs, MD    Office Visit    3 months ago Strep pharyngitis    [facility], [address] [place] [place], Olu Gandara MD    Office Visit    4 months ago Sore throat    [address] [facility], Althea Schafer APRN    Office Visit    5 months ago Chronic sinusitis, unspecified location    [facility], Jammie Mclean MD    Office Visit

## 2023-09-22 ENCOUNTER — MED REC SCAN ONLY (OUTPATIENT)
Dept: INTERNAL MEDICINE CLINIC | Facility: CLINIC | Age: 48
End: 2023-09-22

## 2023-09-29 DIAGNOSIS — G47.00 INSOMNIA, UNSPECIFIED TYPE: ICD-10-CM

## 2023-09-30 RX ORDER — ZOLPIDEM TARTRATE 5 MG/1
5 TABLET ORAL NIGHTLY PRN
Qty: 7 TABLET | Refills: 0 | Status: SHIPPED | OUTPATIENT
Start: 2023-09-30

## 2023-09-30 NOTE — TELEPHONE ENCOUNTER
Please review. Protocol failed or has no protocol. zolpidem 5 MG Oral Tab [Melissa Mattson]      Patient Comment: Going on a trip and only have 1 left. 4 would be enough. Requested Prescriptions   Pending Prescriptions Disp Refills    zolpidem 5 MG Oral Tab 7 tablet 0     Sig: Take 1 tablet (5 mg total) by mouth nightly as needed for Sleep.        There is no refill protocol information for this order          Recent Outpatient Visits              3 weeks ago Physical exam, annual    Nelma Eisenmenger, Simmie Hals, MD    Office Visit    4 months ago Chronic sinusitis, unspecified location    [address] [place] [address] Sherwin Merlos MD    Office Visit    4 months ago Strep pharyngitis    Nelma Eisenmenger, [place] Daneen Feinstein, MD    Office Visit    4 months ago Sore throat    Nelma Eisenmenger, [place], APRN    Office Visit    6 months ago Chronic sinusitis, unspecified location    [address] [place] [address], Sherwin Merlos MD    Office Visit            Future Appointments         Provider Department Appt Notes    In 1 month GUS PETE RM1; ADSHAD ANUP [facility]     In 3 months Marcela Peters MD [address], [address] [facility] Diabetes check up

## 2023-10-02 ENCOUNTER — TELEPHONE (OUTPATIENT)
Dept: INTERNAL MEDICINE CLINIC | Facility: CLINIC | Age: 48
End: 2023-10-02

## 2023-10-02 DIAGNOSIS — E11.65 TYPE 2 DIABETES MELLITUS WITH HYPERGLYCEMIA, WITHOUT LONG-TERM CURRENT USE OF INSULIN (HCC): Primary | ICD-10-CM

## 2023-10-09 ENCOUNTER — TELEMEDICINE (OUTPATIENT)
Dept: INTERNAL MEDICINE CLINIC | Facility: CLINIC | Age: 48
End: 2023-10-09

## 2023-10-09 ENCOUNTER — TELEPHONE (OUTPATIENT)
Dept: INTERNAL MEDICINE CLINIC | Facility: CLINIC | Age: 48
End: 2023-10-09

## 2023-10-09 DIAGNOSIS — E11.65 TYPE 2 DIABETES MELLITUS WITH HYPERGLYCEMIA, WITHOUT LONG-TERM CURRENT USE OF INSULIN (HCC): ICD-10-CM

## 2023-10-09 DIAGNOSIS — E06.3 HASHIMOTO'S THYROIDITIS: Primary | ICD-10-CM

## 2023-10-09 RX ORDER — METFORMIN HYDROCHLORIDE 500 MG/1
500 TABLET, EXTENDED RELEASE ORAL 2 TIMES DAILY WITH MEALS
Qty: 180 TABLET | Refills: 3 | Status: SHIPPED | OUTPATIENT
Start: 2023-10-09

## 2023-10-09 RX ORDER — LEVOTHYROXINE SODIUM 0.07 MG/1
75 TABLET ORAL
Qty: 30 TABLET | Refills: 2 | Status: SHIPPED | OUTPATIENT
Start: 2023-10-09 | End: 2023-10-09

## 2023-10-09 RX ORDER — LEVOTHYROXINE SODIUM 0.07 MG/1
75 TABLET ORAL
Qty: 90 TABLET | Refills: 2 | Status: SHIPPED | OUTPATIENT
Start: 2023-10-09

## 2023-10-09 NOTE — TELEPHONE ENCOUNTER
Wegovy prior auth needed      At baseline (prior to the initiation of Wegovy), did the patient have a body mass index (BMI) greater than or equal to 30 kilograms per meter squared (30 kg/m2)?

## 2023-10-09 NOTE — PROGRESS NOTES
Patient ID: Lawrence Leslie is a 50year old female. No chief complaint on file. HISTORY OF PRESENT ILLNESS:   Patient presents for above. This visit is conducted using Telemedicine with live, interactive video and audio. C/c follow up  C/o would like her trulicity but it cost $556 -insurance put a block on it SO WOULD LIKE  [address]       Review of [address]: No fevers, no chills,no  sweats, no fatigue  VISION: No recent vision problems,no blurry vision or double vision  HAS APT WITH [facility]    RESPIRATORY: denies shortness of breath, no cough, no wheezing  CARDIOVASCULAR: denies chest pain, no palpitations, no swelling in feet  NEURO: denies headaches ,no anxiety, no depression-stable     MEDICAL HISTORY:     Past Medical History:   Diagnosis Date    Anesthesia complication     Anxiety     Arthritis     Diabetes ([facility] [address]     Disorder of thyroid     Hypothyroidism     1-2021 + Hashimoto    Pseudocholinesterase deficiency        Past Surgical History:   Procedure Laterality Date    ABLATION  02/12/2021    cervical    COLONOSCOPY N/A 9/10/2022    Procedure: COLONOSCOPY;  Surgeon: Neil Giles MD;  Location: [address] [place] [address] ENDOSCOPY    ANUP BIOPSY STEREO NODULE 1 SITE LEFT (CPT=19081)  07/07/2022    CLIP - TOP HAT    OTHER SURGICAL HISTORY Right     meniscus repair     OTHER SURGICAL HISTORY      exp lap     OTHER SURGICAL HISTORY      sinus surg x 2          Current Outpatient Medications:     semaglutide-weight management 0.25 MG/0.5ML Subcutaneous Solution Auto-injector, Inject 0.5 mL (0.25 mg total) into the skin once a week for 4 doses. , Disp: 2 mL, Rfl: 0    metFORMIN  MG Oral Tablet 24 Hr, Take 1 tablet (500 mg total) by mouth 2 (two) times daily with meals. , Disp: 180 tablet, Rfl: 3    levothyroxine 75 MCG Oral Tab, Take 1 tablet (75 mcg total) by mouth before breakfast., Disp: 90 tablet, Rfl: 2    zolpidem 5 MG Oral Tab, Take 1 tablet (5 mg total) by mouth nightly as needed for Sleep., Disp: 7 tablet, Rfl: 0    escitalopram 10 MG Oral Tab, Take 1 tablet (10 mg total) by mouth daily. , Disp: 90 tablet, Rfl: 3    progesterone 200 MG Oral Cap, Take 1 capsule (200 mg total) by mouth nightly., Disp: , Rfl:     TESTOSTERONE BU, Place inside cheek., Disp: , Rfl:     Emollient (DHEA EX), Apply 50 mg topically. 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             pseudocholinesterase deficiency    Social History    Socioeconomic History      Marital status: Single      Spouse name: Not on file      Number of children: Not on file      Years of education: Not on file      Highest education level: Not on file    Occupational History      Not on file    Tobacco Use      Smoking status: Never      Smokeless tobacco: Never    Vaping Use      Vaping Use: Not on file    Substance and Sexual Activity      Alcohol use: Yes        Comment: occa      Drug use: Never        Comment: occ gummies       Sexual activity: Not on file    Other Topics      Concerns:        Caffeine Concern: Not Asked        Exercise: Not Asked        Seat Belt: Yes        Special Diet: Not Asked        Stress Concern: Not Asked        Weight Concern: Not Asked    Social History Narrative      Not on file    Social Determinants of Health  Financial Resource Strain: Not on file  Food Insecurity: Not on file  Transportation Needs: Not on file  Physical Activity: Not on file  Stress: Not on file  Social Connections: Not on file  Housing Stability: Not on file    PHYSICAL EXAM:   Unable to perform vitals or do physical exam as this is a virtual video visit. Patient appears alert  And oriented x3, no acute distress  Patient speaking complete sentences or any conversational dyspnea or respiratory distress  No coughing heard        ASSESSMENT/PLAN:   1. Hashimoto's thyroiditis  - ENDOCRINOLOGY - INTERNAL  - levothyroxine 75 MCG Oral Tab; Take 1 tablet (75 mcg total) by mouth before breakfast.  Dispense: 90 tablet; Refill: 2    Tsh in July 2023 was normal, continue-refilled      2. Type 2 diabetes mellitus with hyperglycemia, without long-term current use of insulin (HCC)  - semaglutide-weight management 0.25 MG/0.5ML Subcutaneous Solution Auto-injector; Inject 0.5 mL (0.25 mg total) into the skin once a week for 4 doses. Dispense: 2 mL; Refill: 0  - metFORMIN  MG Oral Tablet 24 Hr; Take 1 tablet (500 mg total) by mouth 2 (two) times daily with meals. Dispense: 180 tablet; Refill: 3  - ENDOCRINOLOGY - INTERNAL    will try Wegovy and is aware that if there is any issues then she should see endocrinology     Initial: 0.25 mg once weekly for 4 weeks, then increase to 0.5 mg once weekly. May increase to 1 mg once weekly after 4 weeks on the 0.5 mg/week dose if needed to achieve glycemic goals; may increase further to 2 mg once weekly after 4 weeks on the 1 mg/week dose if needed to achieve glycemic goals (maximum: 2 mg/week)       No follow-ups on file. Time spent on encounter  12 minutes   Video time 12 minutes   Documentation time 12 minutes     Rupal Fountain understands video evaluation is not a substitute for face-to-face examination or emergency care. Patient advised to go to ER or call 911 for worsening symptoms or acute distress. Telehealth outside of [facility] [address] Verbal Consent   I conducted a telehealth visit with Rupal Fountain today, 10/09/23, which was completed using two-way, real-time interactive audio and video communication.  This has been done in good christa to provide continuity of care in the best interest of the provider-patient relationship, due to the COVID -19 public health crisis/national emergency where restrictions of face-to-face office visits are ongoing. Every conscious effort was taken to allow for sufficient and adequate time to complete the visit. The patient was made aware of the limitations of the telehealth visit, including treatment limitations as no physical exam could be performed. The patient was advised to call 911 or to go to the ER in case there was an emergency. The patient was also advised of the potential privacy & security concerns related to the telehealth platform. The patient was made aware of where to find [facility] notice of privacy practices, telehealth consent form and other related consent forms and documents. which are located on the [facility] website. The patient verbally agreed to telehealth consent form, related consents and the risks discussed. Lastly, the patient confirmed that they were in [facility]. Included in this visit, time may have been spent reviewing labs, medications, radiology tests and decision making. Appropriate medical decision-making and tests are ordered as detailed in the plan of care above. Coding/billing information is submitted for this visit based on complexity of care and/or time spent for the visit. This note was prepared using [employer] [place] [employer] voice recognition dictation software. As a result errors may occur. When identified these errors have been corrected.  While every attempt is made to correct errors during dictation discrepancies may still exist.    Dottie Mejia MD  10/9/2023

## 2023-10-10 NOTE — TELEPHONE ENCOUNTER
Approved    Prior authorization approved Case ID: [id number]      Payer: Veronica Le[address]    [phone]    [phone]   [id number]; Review Type:Prior Auth; Coverage Start Date:09/10/2023; Coverage End Date:05/07/2024;    Approval Details    Authorized from September 10, 2023 to May 7, 2024      Electronic appeal: Not supported   View History

## 2023-10-17 ENCOUNTER — PATIENT MESSAGE (OUTPATIENT)
Dept: INTERNAL MEDICINE CLINIC | Facility: CLINIC | Age: 48
End: 2023-10-17

## 2023-10-17 DIAGNOSIS — E11.65 TYPE 2 DIABETES MELLITUS WITH HYPERGLYCEMIA, WITHOUT LONG-TERM CURRENT USE OF INSULIN (HCC): Primary | ICD-10-CM

## 2023-10-17 NOTE — TELEPHONE ENCOUNTER
From: Ab Vogel  To: Shon Morrison  Sent: 10/17/2023 9:26 AM CDT  Subject: Trulicity and Wygovy    Hey, hope you are well. So, apparently getting that starter dose of Wygovy is hard. It is out of stock everywhere. I spoke to my Rx provider and they said the reason my Trulicity is being denied is because it was for a 30 day supply and my plan requires a 90 day order. So, since Wygovy is out of stock. Can you put in order for a 90- day Trulicity 3mg? I have been at 1.5 for over 6 months and would like to step up to see if it helps me with appetite.     It also apparently has to go to [facility], not [facility]. So I would like:    [place], [address]

## 2023-10-18 RX ORDER — DULAGLUTIDE 3 MG/.5ML
3 INJECTION, SOLUTION SUBCUTANEOUS WEEKLY
Qty: 12 EACH | Refills: 3 | Status: SHIPPED | OUTPATIENT
Start: 2023-10-18

## 2023-10-19 NOTE — TELEPHONE ENCOUNTER
Dulaglutide (TRULICITY) 3 XT/6.4EM Subcutaneous Solution Pen-injector 12 each 3 10/18/2023     Sig - Route: Inject 3 mg into the skin once a week.  - Subcutaneous    Sent to pharmacy as: Trulicity 3 SUE/5.0UZ Subcutaneous Solution Pen-injector (Dulaglutide)    E-Prescribing Status: Receipt confirmed by pharmacy (10/19/2023  7:24 AM CDT)    Prior authorization: Closed - Prior Authorization not required for patient/medication      Associated Diagnoses    Type 2 diabetes mellitus with hyperglycemia, without long-term current use of insulin [facility]  - [id number] [place], [place] - [address] [facility], [phone], [phone]

## 2023-11-03 ENCOUNTER — HOSPITAL ENCOUNTER (OUTPATIENT)
Dept: MAMMOGRAPHY | Age: 48
Discharge: HOME | End: 2023-11-03
Attending: INTERNAL MEDICINE
Payer: COMMERCIAL

## 2023-11-03 DIAGNOSIS — Z12.31 SCREENING MAMMOGRAM, ENCOUNTER FOR: ICD-10-CM

## 2023-11-03 PROCEDURE — 77067 SCR MAMMO BI INCL CAD: CPT | Performed by: INTERNAL MEDICINE

## 2023-11-03 PROCEDURE — 77063 BREAST TOMOSYNTHESIS BI: CPT | Performed by: INTERNAL MEDICINE

## 2023-11-06 ENCOUNTER — PATIENT MESSAGE (OUTPATIENT)
Dept: INTERNAL MEDICINE CLINIC | Facility: CLINIC | Age: 48
End: 2023-11-06

## 2023-11-20 ENCOUNTER — TELEMEDICINE (OUTPATIENT)
Dept: INTERNAL MEDICINE CLINIC | Facility: CLINIC | Age: 48
End: 2023-11-20
Payer: COMMERCIAL

## 2023-11-20 DIAGNOSIS — Z72.820 POOR SLEEP: Primary | ICD-10-CM

## 2023-11-20 RX ORDER — TRAZODONE HYDROCHLORIDE 100 MG/1
100 TABLET ORAL NIGHTLY
Qty: 90 TABLET | Refills: 3 | Status: SHIPPED | OUTPATIENT
Start: 2023-11-20 | End: 2024-11-14

## 2023-11-20 NOTE — PROGRESS NOTES
Patient ID: Vinita Sepulveda is a 50year old female. No chief complaint on file. HISTORY OF PRESENT ILLNESS:   Patient presents for above. This visit is conducted using Telemedicine with live, interactive video and audio. C/c follow up  C/o Trulicity was thought to make her nauseated but it was a viral inf   Having a difficulty  time --took even half of the [place] and it isnt helping   Had labs drawn through dr Federico Talley --testosterone a little high but she she is on testosterone   Although she does take Ambien for when she travels, this kind of the sleep deprivation that she is having is not like her usual ones that she gets  with travel      Review of Systems   MEDICAL HISTORY:     Past Medical History:   Diagnosis Date    Anesthesia complication     Anxiety     Arthritis     Diabetes ([facility] [address]     Disorder of thyroid     Hypothyroidism     1-2021 + Hashimoto    Pseudocholinesterase deficiency        Past Surgical History:   Procedure Laterality Date    ABLATION  02/12/2021    cervical    COLONOSCOPY N/A 9/10/2022    Procedure: COLONOSCOPY;  Surgeon: Merline Proctor, MD;  Location: [facility] ENDOSCOPY    ANUP BIOPSY STEREO NODULE 1 SITE LEFT (CPT=19081)  07/07/2022    CLIP - TOP HAT    OTHER SURGICAL HISTORY Right     meniscus repair     OTHER SURGICAL HISTORY      exp lap     OTHER SURGICAL HISTORY      sinus surg x 2          Current Outpatient Medications:     traZODone 100 MG Oral Tab, Take 1 tablet (100 mg total) by mouth nightly., Disp: 90 tablet, Rfl: 3    Dulaglutide (TRULICITY) 3 HJ/7.0PL Subcutaneous Solution Pen-injector, Inject 3 mg into the skin once a week., Disp: 12 each, Rfl: 3    metFORMIN  MG Oral Tablet 24 Hr, Take 1 tablet (500 mg total) by mouth 2 (two) times daily with meals. , Disp: 180 tablet, Rfl: 3    levothyroxine 75 MCG Oral Tab, Take 1 tablet (75 mcg total) by mouth before breakfast., Disp: 90 tablet, Rfl: 2    zolpidem 5 MG Oral Tab, Take 1 tablet (5 mg total) by mouth nightly as needed for Sleep., Disp: 7 tablet, Rfl: 0    progesterone 200 MG Oral Cap, Take 1 capsule (200 mg total) by mouth nightly., Disp: , Rfl:     TESTOSTERONE BU, Place inside cheek., Disp: , Rfl:     Emollient (DHEA EX), Apply 50 mg topically. , Disp: , Rfl:     Lactobacillus (PROBIOTIC ACIDOPHILUS) Oral Tab, Take by mouth., Disp: , Rfl:     B Complex Vitamins (VITAMIN B COMPLEX OR), Inject as directed., Disp: , Rfl:     Nutritional Supplements (IMMUNE ENHANCE OR), Take by mouth., Disp: , Rfl:     Magnesium 200 MG Oral Tab, Take by mouth., Disp: , Rfl:     Cholecalciferol (VITAMIN D3) 250 MCG (56404 UT) Oral Cap, , Disp: , Rfl:     Allergies: Allergies   Allergen Reactions    Succinylcholine OTHER (SEE COMMENTS) and UNKNOWN     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       Social History     Socioeconomic History    Marital status: Single     Spouse name: Not on file    Number of children: Not on file    Years of education: Not on file    Highest education level: Not on file   Occupational History    Not on file   Tobacco Use    Smoking status: Never    Smokeless tobacco: Never   Vaping Use    Vaping Use: Not on file   Substance and Sexual Activity    Alcohol use: Yes     Comment: occa    Drug use: Never     Comment: occ gummies     Sexual activity: Not on file   Other Topics Concern    Caffeine Concern Not Asked    Exercise Not Asked    Seat Belt Yes    Special Diet Not Asked    Stress Concern Not Asked    Weight Concern Not Asked   Social History Narrative    Not on file     Social Determinants of Health     Financial Resource Strain: Not on file   Food Insecurity: Not on file   Transportation Needs: Not on file   Physical Activity: Not on file   Stress: Not on file   Social Connections: Not on file   Housing Stability: Not on file       PHYSICAL EXAM:   Unable to perform vitals or do physical exam as this is a virtual video visit.   Patient appears alert  And oriented x3, no acute distress  Patient speaking complete sentences without any conversational dyspnea or respiratory distress  No coughing heard      ASSESSMENT/PLAN:   1. Poor sleep  - traZODone 100 MG Oral Tab; Take 1 tablet (100 mg total) by mouth nightly. Dispense: 90 tablet; Refill: 3    Wean off the lexapro  and will try trazodone     No follow-ups on file. Time spent on encounter  11 minutes   Video time 11 minutes   Documentation time 11 minutes     Soy Morrison understands video evaluation is not a substitute for face-to-face examination or emergency care. Patient advised to go to ER or call 911 for worsening symptoms or acute distress. Telehealth outside of [address] Verbal Consent   I conducted a telehealth visit with Soy Morrison today, 11/20/23, which was completed using two-way, real-time interactive audio and video communication. This has been done in good christa to provide continuity of care in the best interest of the provider-patient relationship, due to the COVID -19 public health crisis/national emergency where restrictions of face-to-face office visits are ongoing. Every conscious effort was taken to allow for sufficient and adequate time to complete the visit. The patient was made aware of the limitations of the telehealth visit, including treatment limitations as no physical exam could be performed. The patient was advised to call 911 or to go to the ER in case there was an emergency. The patient was also advised of the potential privacy & security concerns related to the telehealth platform. The patient was made aware of where to find [facility] notice of privacy practices, telehealth consent form and other related consent forms and documents. which are located on the [facility] website. The patient verbally agreed to telehealth consent form, related consents and the risks discussed. Lastly, the patient confirmed that they were in [place].    Included in this visit, time may have been spent reviewing labs, medications, radiology tests and decision making. Appropriate medical decision-making and tests are ordered as detailed in the plan of care above. Coding/billing information is submitted for this visit based on complexity of care and/or time spent for the visit. This note was prepared using [employer] voice recognition dictation software. As a result errors may occur. When identified these errors have been corrected.  While every attempt is made to correct errors during dictation discrepancies may still exist.    Dottie Mejia MD  11/20/2023

## 2023-12-19 ENCOUNTER — MED REC SCAN ONLY (OUTPATIENT)
Dept: INTERNAL MEDICINE CLINIC | Facility: CLINIC | Age: 48
End: 2023-12-19

## 2023-12-22 ENCOUNTER — NURSE TRIAGE (OUTPATIENT)
Dept: INTERNAL MEDICINE CLINIC | Facility: CLINIC | Age: 48
End: 2023-12-22

## 2023-12-22 NOTE — TELEPHONE ENCOUNTER
Action Requested: Summary for Provider     []  Critical Lab, Recommendations Needed  [] Need Additional Advice  []   FYI    []   Need Orders  [] Need Medications Sent to Pharmacy  []  Other     SUMMARY: Pt reports headache, sinsue pain for 1 week. Pt denies fever, no congestion, no cough. Pt states she may have a sinus infection. No more appointments available. Advised Walk in clinic nearby. Pt verbalized understanding and Pt will check mychart for locations.       Reason for call: Headache (Sinus-1 week, left eye above pain)  Onset: Data Unavailable                     Reason for Disposition   Unexplained headache that is present > 24 hours    Protocols used: Headache-A-OH

## 2024-01-11 ENCOUNTER — OFFICE VISIT (OUTPATIENT)
Dept: INTERNAL MEDICINE CLINIC | Facility: CLINIC | Age: 49
End: 2024-01-11
Payer: COMMERCIAL

## 2024-01-11 VITALS
DIASTOLIC BLOOD PRESSURE: 97 MMHG | RESPIRATION RATE: 18 BRPM | SYSTOLIC BLOOD PRESSURE: 154 MMHG | BODY MASS INDEX: 39.08 KG/M2 | HEART RATE: 69 BPM | HEIGHT: 66 IN | WEIGHT: 243.19 LBS

## 2024-01-11 DIAGNOSIS — R09.82 PND (POST-NASAL DRIP): Primary | ICD-10-CM

## 2024-01-11 DIAGNOSIS — E11.65 TYPE 2 DIABETES MELLITUS WITH HYPERGLYCEMIA, WITHOUT LONG-TERM CURRENT USE OF INSULIN (HCC): ICD-10-CM

## 2024-01-11 DIAGNOSIS — J32.0 CHRONIC MAXILLARY SINUSITIS: ICD-10-CM

## 2024-01-11 DIAGNOSIS — I10 PRIMARY HYPERTENSION: ICD-10-CM

## 2024-01-11 PROCEDURE — 3077F SYST BP >= 140 MM HG: CPT | Performed by: INTERNAL MEDICINE

## 2024-01-11 PROCEDURE — 99214 OFFICE O/P EST MOD 30 MIN: CPT | Performed by: INTERNAL MEDICINE

## 2024-01-11 PROCEDURE — 3080F DIAST BP >= 90 MM HG: CPT | Performed by: INTERNAL MEDICINE

## 2024-01-11 PROCEDURE — 3008F BODY MASS INDEX DOCD: CPT | Performed by: INTERNAL MEDICINE

## 2024-01-11 RX ORDER — FLUTICASONE PROPIONATE 50 MCG
2 SPRAY, SUSPENSION (ML) NASAL DAILY
Qty: 1 EACH | Refills: 0 | Status: SHIPPED | OUTPATIENT
Start: 2024-01-11 | End: 2025-01-05

## 2024-01-11 RX ORDER — LISINOPRIL 10 MG/1
10 TABLET ORAL DAILY
Qty: 90 TABLET | Refills: 3 | Status: SHIPPED | OUTPATIENT
Start: 2024-01-11 | End: 2025-01-05

## 2024-01-11 NOTE — PROGRESS NOTES
Mindi Bob is a 49 year old female.  Chief Complaint   Patient presents with    Diabetes       HPI:   Pt comes for f/u  C/c follow up  C/o work is stressful , feels like she is working too much   Now aware her BP is high   Trazodone helps her sleep   Cold / sinus inf x one mn - went to [facility] and was abx x 5 days and then It came back so went to  and got doxycycline   Her twin daughters were sick     HISTORY  Trulicity was thought to make her nauseated but it was a viral inf   Having a difficulty  time --took even half of the ambien and it isnt helping   Had labs drawn through dr joel --testosterone a little high but she she is on testosterone     HISTORY  DJD lumbar spine and cervical spine takes meloxicam and cyclobenzaprine but now has stopped         HISTORY  New pt-- used to see anastasia Ward in [facility] -- moved to [place] 5 yrs ago   C/c establish care   C/o needs to establish care with a primary care physician and get checked up, review of systems detailed below with her concerns  Would like to come off of some medications especially the ones that were given for anxiety     PMH  dm2 diag in 2021 -a1c 10.7 in 2021   Hypothyroidism -hashimotots 2019- sees endo at [facility] - dr guillen    Herniated disc in neck - on meloxicam and flexeril--dr ballesteros   Anxiety - on lexapro but was started in 2008 which was a very stressful time  Vit d def -sees dr joel in [facility] - integrative medicine            Lives with two 8 yo twins - girls   Works [employer]                     Current Outpatient Medications   Medication Sig Dispense Refill    lisinopril 10 MG Oral Tab Take 1 tablet (10 mg total) by mouth daily. 90 tablet 3    fluticasone propionate 50 MCG/ACT Nasal Suspension 2 sprays by Each Nare route daily. 1 each 0    traZODone 100 MG Oral Tab Take 1 tablet (100 mg total) by mouth nightly. 90 tablet 3    Dulaglutide (TRULICITY) 3 MG/0.5ML Subcutaneous Solution Pen-injector Inject 3 mg into the skin once a week. 12  each 3    metFORMIN  MG Oral Tablet 24 Hr Take 1 tablet (500 mg total) by mouth 2 (two) times daily with meals. 180 tablet 3    levothyroxine 75 MCG Oral Tab Take 1 tablet (75 mcg total) by mouth before breakfast. 90 tablet 2    zolpidem 5 MG Oral Tab Take 1 tablet (5 mg total) by mouth nightly as needed for Sleep. 7 tablet 0    progesterone 200 MG Oral Cap Take 1 capsule (200 mg total) by mouth nightly.      TESTOSTERONE BU Place inside cheek.      Emollient (DHEA EX) Apply 50 mg topically.      Lactobacillus (PROBIOTIC ACIDOPHILUS) Oral Tab Take by mouth.      B Complex Vitamins (VITAMIN B COMPLEX OR) Inject as directed.      Nutritional Supplements (IMMUNE ENHANCE OR) Take by mouth.      Magnesium 200 MG Oral Tab Take by mouth.      Cholecalciferol (VITAMIN D3) 250 MCG (97695 UT) Oral Cap         Past Medical History:   Diagnosis Date    Anesthesia complication     Anxiety     Arthritis     Diabetes (HCC)     Disorder of thyroid     Hypothyroidism     - + Hashimoto    Obesity 1995    Pseudocholinesterase deficiency       Past Surgical History:   Procedure Laterality Date    Ablation  2021    cervical    Colonoscopy N/A 09/10/2022    Procedure: COLONOSCOPY;  Surgeon: Brooke Claros MD;  Location: [facility] ENDOSCOPY    Benedict biopsy stereo nodule 1 site left (cpt=19081)  2022    CLIP - TOP HAT      2014    Other surgical history Right     meniscus repair     Other surgical history      exp lap     Other surgical history      sinus surg x 2       Social History:  Social History     Socioeconomic History    Marital status: Single   Tobacco Use    Smoking status: Never    Smokeless tobacco: Never   Substance and Sexual Activity    Alcohol use: Yes     Alcohol/week: 2.0 standard drinks of alcohol     Types: 2 Glasses of wine per week     Comment: occa    Drug use: Never     Comment: occ gummies    Other Topics Concern    Seat Belt Yes        REVIEW OF SYSTEMS:   GENERAL HEALTH: No  fevers, chills, sweats,+  fatigue  HEENT: No decreased hearing ear pain + nasal congestion + sore throat  SKIN: denies any unusual skin lesions or rashes  RESPIRATORY: denies shortness of breath, cough, wheezing  CARDIOVASCULAR: denies chest pain on exertion, palpitations, swelling in feet  NEURO: +  headaches but no teeth pain ,+ anxiety > depression    EXAM:   BP (!) 154/97 (BP Location: Right arm, Patient Position: Sitting, Cuff Size: large)   Pulse 69   Resp 18   Ht 5' 6\" (1.676 m)   Wt 243 lb 3.2 oz (110.3 kg)   BMI 39.25 kg/m²   GENERAL: well developed, well nourished,in no apparent distress  SKIN: no rashes,no suspicious lesions  HEENT: atraumatic, normocephalic,ears and throat are clear for some cobblestoning in the back of the throat, mild frontal or maxillary sinus tenderness   NECK: supple,no adenopathy,nontender   LUNGS: clear to auscultation, no wheeze  CARDIO: RRR without murmur  EXTREMITIES: no  edema    ASSESSMENT AND PLAN:   Diagnoses and all orders for this visit:    PND (post-nasal drip)  -     fluticasone propionate 50 MCG/ACT Nasal Suspension; 2 sprays by Each Nare route daily.  Advised patient to take Xyzal for the next 2 weeks, take Flonase for at least 7 to 10 days    Chronic maxillary sinusitis  -     ENT - INTERNAL  If recurrent sinusitis then follow-up with ENT    Primary hypertension  -     lisinopril 10 MG Oral Tab; Take 1 tablet (10 mg total) by mouth daily.  Advised pt to follow a low salt , low sodium (including fast foods and processed foods), can look up DASH diet, exercise 30 min a day , monitor bp   Start lisinopril     Type 2 diabetes mellitus with hyperglycemia, without long-term current use of insulin (HCC)    Hba1c 5.6 on 4/2022 and 6.4 on 3/2023 and 6.1 on 6/2023 and 6 on 10/30/2023   [facility] 9/2023  Eye exam  - [facility] -    Consider Asa , start acei, consider statin   Foot 9/5/2023  Diet -- advised to follow a low carb, low sugar diet , try to be consistent                 Exercise 30 min a day                  Preventive medicine  Pap smear she goes to [facility]-5/19/2022 seen in care everywhere  Mammogram - 11/2023   cscope - dr perdue 9/2022 and rpt in 10 yrs   Labs 12/2023, 10/2023 reviewed          The patient indicates understanding of these issues and agrees to the plan.  No follow-ups on file.

## 2024-01-17 ENCOUNTER — PATIENT MESSAGE (OUTPATIENT)
Dept: ENDOCRINOLOGY CLINIC | Facility: CLINIC | Age: 49
End: 2024-01-17

## 2024-01-17 ENCOUNTER — OFFICE VISIT (OUTPATIENT)
Dept: ENDOCRINOLOGY CLINIC | Facility: CLINIC | Age: 49
End: 2024-01-17
Payer: COMMERCIAL

## 2024-01-17 VITALS
SYSTOLIC BLOOD PRESSURE: 124 MMHG | BODY MASS INDEX: 38.41 KG/M2 | HEART RATE: 58 BPM | DIASTOLIC BLOOD PRESSURE: 77 MMHG | HEIGHT: 66 IN | WEIGHT: 239 LBS

## 2024-01-17 DIAGNOSIS — E78.5 DYSLIPIDEMIA: ICD-10-CM

## 2024-01-17 DIAGNOSIS — E03.9 HYPOTHYROIDISM, UNSPECIFIED TYPE: ICD-10-CM

## 2024-01-17 DIAGNOSIS — E11.65 TYPE 2 DIABETES MELLITUS WITH HYPERGLYCEMIA, WITHOUT LONG-TERM CURRENT USE OF INSULIN (HCC): Primary | ICD-10-CM

## 2024-01-17 LAB
CARTRIDGE LOT#: ABNORMAL NUMERIC
GLUCOSE BLOOD: 122
HEMOGLOBIN A1C: 6.4 % (ref 4.3–5.6)
TEST STRIP EXPIRATION DATE: NORMAL DATE
TEST STRIP LOT #: NORMAL NUMERIC

## 2024-01-17 PROCEDURE — 3044F HG A1C LEVEL LT 7.0%: CPT | Performed by: INTERNAL MEDICINE

## 2024-01-17 PROCEDURE — 83036 HEMOGLOBIN GLYCOSYLATED A1C: CPT | Performed by: INTERNAL MEDICINE

## 2024-01-17 PROCEDURE — 3008F BODY MASS INDEX DOCD: CPT | Performed by: INTERNAL MEDICINE

## 2024-01-17 PROCEDURE — 82947 ASSAY GLUCOSE BLOOD QUANT: CPT | Performed by: INTERNAL MEDICINE

## 2024-01-17 PROCEDURE — 3074F SYST BP LT 130 MM HG: CPT | Performed by: INTERNAL MEDICINE

## 2024-01-17 PROCEDURE — 99244 OFF/OP CNSLTJ NEW/EST MOD 40: CPT | Performed by: INTERNAL MEDICINE

## 2024-01-17 PROCEDURE — 3078F DIAST BP <80 MM HG: CPT | Performed by: INTERNAL MEDICINE

## 2024-01-17 NOTE — H&P
Name: Mindi Bob  Date: 1/17/2024    Referring Physician: No ref. provider found    HISTORY OF PRESENT ILLNESS   Mindi Bob is a 49 year old female who presents for evaluation and management of type 2 diabetes. She was diagnosed with diabetes about 2 years ago.     Blood Glucose Today: 122  HbA1C or glycohemoglobin  6.4 today (and it was 6.0 on 10/30/23)  Type 1 or Type 2?: Type 2  Medications for DM: Trulicity 3.0mg qweekly (1 year); Metformin ER 500mg PO bid  Checking a few times a week (150 is the highest in the middle of the day an hour after she ate)   Episodes of hypoglycemia: none    Dietary compliance: good    Exercise: walks the dog everyday for 30 minutes    Polyuria/polydipsia: none    Blurred vision: none    Flu Vaccine This Season: yes    Covid Vaccine: yes    REVIEW OF SYSTEMS  CV: Cardiovascular disease present: none         Hypertension present: at goal, on meds         Hyperlipidemia present: HDL is low, rest at goal, not on meds (10/30/23)         Peripheral Vascular Disease present: none    : Nephropathy present: MAC: none (9/05/23); Creatinine: 0.90     Neuro: Neuropathy present: none    Eyes: Diabetic retinopathy present: none            Most recent visit to eye doctor in last 12 months: yes, recently    Skin: Infection or ulceration: none    Osteoporosis/ Osteopenia: none Vitamin D: 61.1    Thyroid disease: yes; TSH: 2.67 (12/12/23- she is on 75mcg of levothyroxine)    Medications:     Current Outpatient Medications:     levothyroxine 75 MCG Oral Tab, Take 1 tablet (75 mcg total) by mouth before breakfast., Disp: 90 tablet, Rfl: 2    lisinopril 10 MG Oral Tab, Take 1 tablet (10 mg total) by mouth daily., Disp: 90 tablet, Rfl: 3    fluticasone propionate 50 MCG/ACT Nasal Suspension, 2 sprays by Each Nare route daily., Disp: 1 each, Rfl: 0    traZODone 100 MG Oral Tab, Take 1 tablet (100 mg total) by mouth nightly., Disp: 90 tablet, Rfl: 3    Dulaglutide (TRULICITY) 3 MG/0.5ML  Subcutaneous Solution Pen-injector, Inject 3 mg into the skin once a week., Disp: 12 each, Rfl: 3    metFORMIN  MG Oral Tablet 24 Hr, Take 1 tablet (500 mg total) by mouth 2 (two) times daily with meals., Disp: 180 tablet, Rfl: 3    zolpidem 5 MG Oral Tab, Take 1 tablet (5 mg total) by mouth nightly as needed for Sleep., Disp: 7 tablet, Rfl: 0    progesterone 200 MG Oral Cap, Take 1 capsule (200 mg total) by mouth nightly., Disp: , Rfl:     TESTOSTERONE BU, Place inside cheek., Disp: , Rfl:     Emollient (DHEA EX), Apply 50 mg topically., Disp: , Rfl:     Lactobacillus (PROBIOTIC ACIDOPHILUS) Oral Tab, Take by mouth., Disp: , Rfl:     B Complex Vitamins (VITAMIN B COMPLEX OR), Inject as directed., Disp: , Rfl:     Nutritional Supplements (IMMUNE ENHANCE OR), Take by mouth., Disp: , Rfl:     Magnesium 200 MG Oral Tab, Take by mouth., Disp: , Rfl:     Cholecalciferol (VITAMIN D3) 250 MCG (51482 UT) Oral Cap, , Disp: , Rfl:      Allergies:   Allergies   Allergen Reactions    Succinylcholine OTHER (SEE COMMENTS) and UNKNOWN     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       Social History:   Social History     Socioeconomic History    Marital status: Single   Tobacco Use    Smoking status: Never    Smokeless tobacco: Never   Substance and Sexual Activity    Alcohol use: Yes     Alcohol/week: 2.0 standard drinks of alcohol     Types: 2 Glasses of wine per week     Comment: occa    Drug use: Never     Comment: occ gummies    Other Topics Concern    Seat Belt Yes       Medical History:   Past Medical History:   Diagnosis Date    Anesthesia complication     Anxiety     Arthritis     Diabetes (HCC)     Disorder of thyroid     Hypothyroidism     1-2021 + Hashimoto    Obesity 01/01/1995    Pseudocholinesterase deficiency        Surgical history:   Past Surgical History:   Procedure Laterality Date    ABLATION  02/12/2021    cervical    COLONOSCOPY N/A 09/10/2022    Procedure: COLONOSCOPY;   Surgeon: Brooke Claros MD;  Location: [facility] ENDOSCOPY    ANUP BIOPSY STEREO NODULE 1 SITE LEFT (CPT=19081)  2022    CLIP - TOP HAT      2014    OTHER SURGICAL HISTORY Right     meniscus repair     OTHER SURGICAL HISTORY      exp lap     OTHER SURGICAL HISTORY      sinus surg x 2          PHYSICAL EXAM  Vitals:    24 1327   BP: 124/77   Pulse: 58   Weight: 239 lb (108.4 kg)   Height: 5' 6\" (1.676 m)       General Appearance:  alert, well developed, in no acute distress  Eyes:  normal conjunctivae, sclera., normal sclera and normal pupils  Ears/Nose/Mouth/Throat/Neck:  no palpable thyroid nodules or cervical lymphadenopathy  Neck: Trachea midline: Normal  Psychiatric:  oriented to time, self, and place  Nutritional:  no abnormal weight gain or loss    Lab Data:   Lab Results   Component Value Date     (H) 2021    A1C 5.6 2022     Lab Results   Component Value Date     (H) 2021    BUN 14 2021    BUNCREA 15.1 2021    CREATSERUM 0.93 2021    ANIONGAP 5 2021    GFRNAA 76 2023    GFRAA 85 2021    CA 8.9 2021    OSMOCALC 289 2021    ALKPHO 91 2021    AST 13 (L) 2021    ALT 21 2021    BILT 0.5 2021    TP 7.8 2021    ALB 3.7 2021    GLOBULIN 4.1 2021     (L) 2021    K 4.3 2021     2021    CO2 27.0 2021     Lab Results   Component Value Date    CHOLEST 167 06/15/2020    TRIG 140 2022    HDL 55 2022    LDL 98 06/15/2020    VLDL 33 (H) 06/15/2020    NONHDLC 122 2022     Lab Results   Component Value Date    MALBP <0.50 2023    CREUR 44.80 2023         ASSESSMENT/PLAN:  This is a 49 year-old woman here for evaluation and management of uncontrolled type 2 diabetes. We discussed the ABCs of DM.     1.) Hyperglycemia Management- We discussed the importance of glycemic control to prevent complications of diabetes. We discussed  the importance of SBGM. I offered and provided patient education materials and offered a blood glucose log book.   - Continue Trulicity 3.0mg qweekly for now and then she is going to find out about insurance coverage for the Mounjaro and I will prescribe this for her.   - Start taking metformin 1000mg PO at bedtime  - Check blood sugars fasting and/ or 2 hours after biggest meal    2.) Management of Diabetic Complications- We discussed the complications of diabetes include retinopathy, neuropathy, nephropathy and cardiovascular disease.   - Ophtho- up to date  - Flu and Covid vaccine- up to date  - BP- at goal, on meds  - Lipids- check in 3 months  - MAC- check in 3 months  - CMP- check in 3 months  - Neuropathy- none  - CAD- none    3.) Lifestyle Management for Diabetes- We discussed importance of a low CHO diet, and recommend 45gm per meal or 135gm per day. We discussed the importance of trying to follow a Mediterranean diet, with an emphasis on vegetables at every meal, with lots whole grains, and protein from either plant-based sources, or poultry and fish.   - Diet- eats healthy  - Exercise- will exercise more    Return to clinic in 3 months    Prior to this encounter, I spent over 20 minutes with preparing for the visit, reviewing documents from other specialties as well as from PCP, and going over test results. During the face to face encounter, I spent an additional 30 minutes which were determined for history-taking, physical examination, and orientation regarding our services. Greater than 50% of the time was spent in counseling, anticipatory guidance, and coordination of care. Patient concerns were answered to the best of my knowledge.     1/17/2024  Sharla Stuart MD

## 2024-01-17 NOTE — TELEPHONE ENCOUNTER
From: Mindi Bob  To: Sharla Stuart  Sent: 1/17/2024 3:31 PM CST  Subject: Mounjaro    Thank you for the time today. As discussed, I contacted my Rx provider and got some details.    - it requires a pre-authorization: [id number]    - I can have a 1-month or 3-month prescription    - I have to be type-2 diabetic, which feels like a benefit to having diabetes? Sigh.    - It needs to be a Fide. So I would like:     F[facility], [address] [facility]    I am assuming the plan is to have me do a dose for 1 month, then up for next month vivian plan?    Thanks again,    Mindi

## 2024-01-23 RX ORDER — TIRZEPATIDE 7.5 MG/.5ML
7.5 INJECTION, SOLUTION SUBCUTANEOUS WEEKLY
Qty: 2 ML | Refills: 2 | Status: SHIPPED | OUTPATIENT
Start: 2024-01-23 | End: 2024-01-26

## 2024-01-23 RX ORDER — TIRZEPATIDE 7.5 MG/.5ML
7.5 INJECTION, SOLUTION SUBCUTANEOUS WEEKLY
Qty: 2 ML | Refills: 2 | Status: SHIPPED | OUTPATIENT
Start: 2024-01-23 | End: 2024-01-23

## 2024-01-25 PROBLEM — E78.5 DYSLIPIDEMIA: Status: ACTIVE | Noted: 2024-01-25

## 2024-01-25 PROBLEM — E03.9 HYPOTHYROIDISM: Status: ACTIVE | Noted: 2024-01-25

## 2024-01-26 DIAGNOSIS — E11.65 TYPE 2 DIABETES MELLITUS WITH HYPERGLYCEMIA, WITHOUT LONG-TERM CURRENT USE OF INSULIN (HCC): ICD-10-CM

## 2024-01-26 RX ORDER — TIRZEPATIDE 7.5 MG/.5ML
7.5 INJECTION, SOLUTION SUBCUTANEOUS WEEKLY
Qty: 2 ML | Refills: 2 | Status: SHIPPED | OUTPATIENT
Start: 2024-01-26 | End: 2024-02-25

## 2024-01-26 NOTE — TELEPHONE ENCOUNTER
Tirzepatide (MOUNJARO) 7.5 MG/0.5ML Subcutaneous Solution Pen-injector, Inject 7.5 mg into the skin once a week., Disp: 2 mL, Rfl: 2    KEY: [id number]

## 2024-01-26 NOTE — TELEPHONE ENCOUNTER
LOV;01/17/24    RTC;3months    FU;No F/U    Pending Monthly Supply;order pending, approve if appropriate.

## 2024-02-02 ENCOUNTER — OFFICE VISIT (OUTPATIENT)
Dept: OTOLARYNGOLOGY | Facility: CLINIC | Age: 49
End: 2024-02-02
Payer: COMMERCIAL

## 2024-02-02 VITALS — BODY MASS INDEX: 38.41 KG/M2 | HEIGHT: 66 IN | WEIGHT: 239 LBS

## 2024-02-02 DIAGNOSIS — J32.9 CHRONIC SINUSITIS, UNSPECIFIED LOCATION: Primary | ICD-10-CM

## 2024-02-02 PROCEDURE — 99214 OFFICE O/P EST MOD 30 MIN: CPT | Performed by: STUDENT IN AN ORGANIZED HEALTH CARE EDUCATION/TRAINING PROGRAM

## 2024-02-02 PROCEDURE — 3008F BODY MASS INDEX DOCD: CPT | Performed by: STUDENT IN AN ORGANIZED HEALTH CARE EDUCATION/TRAINING PROGRAM

## 2024-02-02 RX ORDER — MELOXICAM 15 MG/1
15 TABLET ORAL NIGHTLY
Qty: 30 TABLET | Refills: 0 | Status: SHIPPED | OUTPATIENT
Start: 2024-02-02 | End: 2024-03-03

## 2024-02-02 RX ORDER — PREDNISONE 20 MG/1
TABLET ORAL
Qty: 23 TABLET | Refills: 0 | Status: SHIPPED | OUTPATIENT
Start: 2024-02-02 | End: 2024-02-17

## 2024-02-02 NOTE — PROGRESS NOTES
Mindi Bob is a 49 year old female.   Chief Complaint   Patient presents with    Sinus Problem     Left side is having a lot of sinus pressure since December     Headache       ASSESSMENT AND PLAN:   1. Chronic sinusitis, unspecified location  29-year-old presents with persistent left-sided maxillary facial pain for about 8 weeks.  She rates it about a 4 out of 10 has been pretty constant.  Feels like a pressure or balloon is in her face.  She has been on 2 courses of oral antibiotics and allergy medications per primary care provider.  No relief.    Nasal endoscopy was negative.  No purulence or polyps.    Will obtain a CT scan to further investigate her sinuses.  Particularly her left maxillary sinus.  Also begin her on a course of oral steroids and meloxicam.  She has a history of diabetes and high blood pressure instructed her to monitor these things on the steroids.  Will follow-up on her results of the scan and response to the treatment    - CT SINUS [facility] ENT (CPT=70486); Future      The patient indicates understanding of these issues and agrees to the plan.      EXAM:   Ht 5' 6\" (1.676 m)   Wt 239 lb (108.4 kg)   BMI 38.58 kg/m²     Pertinent exam findings may also be noted above in assessment and plan     System Details   Skin Inspection - Normal.   Constitutional Overall appearance - Normal.   Head/Face Symmetric, TMJ tenderness not present    Eyes EOMI, PERRL   Right ear:  Canal clear, TM intact, no YOLI   Left ear:  Canal clear, TM intact, no YOLI   Nose: Septum midline, inferior turbinates not enlarged, nasal valves without collapse    Oral cavity/Oropharynx: No lesions or masses on inspection or palpation, tonsils symmetric    Neck: Soft without LAD, thyroid not enlarged  Voice clear/ no stridor   Other:      Scopes and Procedures:             Current Outpatient Medications   Medication Sig Dispense Refill    predniSONE 20 MG Oral Tab Take 2 tablets (40 mg total) by mouth daily for 5 days, THEN  1.5 tablets (30 mg total) daily for 5 days, THEN 1 tablet (20 mg total) daily for 5 days. 23 tablet 0    Meloxicam 15 MG Oral Tab Take 1 tablet (15 mg total) by mouth at bedtime. 30 tablet 0    Tirzepatide (MOUNJARO) 7.5 MG/0.5ML Subcutaneous Solution Pen-injector Inject 7.5 mg into the skin once a week. 2 mL 2    lisinopril 10 MG Oral Tab Take 1 tablet (10 mg total) by mouth daily. 90 tablet 3    fluticasone propionate 50 MCG/ACT Nasal Suspension 2 sprays by Each Nare route daily. 1 each 0    traZODone 100 MG Oral Tab Take 1 tablet (100 mg total) by mouth nightly. 90 tablet 3    metFORMIN  MG Oral Tablet 24 Hr Take 1 tablet (500 mg total) by mouth 2 (two) times daily with meals. 180 tablet 3    levothyroxine 75 MCG Oral Tab Take 1 tablet (75 mcg total) by mouth before breakfast. 90 tablet 2    zolpidem 5 MG Oral Tab Take 1 tablet (5 mg total) by mouth nightly as needed for Sleep. 7 tablet 0    progesterone 200 MG Oral Cap Take 1 capsule (200 mg total) by mouth nightly.      TESTOSTERONE BU Place inside cheek.      Emollient (DHEA EX) Apply 50 mg topically.      Lactobacillus (PROBIOTIC ACIDOPHILUS) Oral Tab Take by mouth.      B Complex Vitamins (VITAMIN B COMPLEX OR) Inject as directed.      Nutritional Supplements (IMMUNE ENHANCE OR) Take by mouth.      Magnesium 200 MG Oral Tab Take by mouth.      Cholecalciferol (VITAMIN D3) 250 MCG (42473 UT) Oral Cap         Past Medical History:   Diagnosis Date    Anesthesia complication     Anxiety     Arthritis     Diabetes (HCC)     Disorder of thyroid     Hypothyroidism     1-2021 + Hashimoto    Obesity 01/01/1995    Pseudocholinesterase deficiency       Social History:  Social History     Socioeconomic History    Marital status: Single   Tobacco Use    Smoking status: Never    Smokeless tobacco: Never   Substance and Sexual Activity    Alcohol use: Yes     Alcohol/week: 2.0 standard drinks of alcohol     Types: 2 Glasses of wine per week     Comment: occa    Drug  use: Never     Comment: occ gummies    Other Topics Concern    Seat Belt Yes          Ferny Bauer MD  2/2/2024  1:41 PM

## 2024-02-07 DIAGNOSIS — R09.82 PND (POST-NASAL DRIP): ICD-10-CM

## 2024-02-07 NOTE — TELEPHONE ENCOUNTER
Received fax from pharmacy patient is requesting 90 day refill       Current Outpatient Medications:       fluticasone propionate 50 MCG/ACT Nasal Suspension, 2 sprays by Each Nare route daily., Disp: 1 each, Rfl: 0

## 2024-02-08 RX ORDER — FLUTICASONE PROPIONATE 50 MCG
2 SPRAY, SUSPENSION (ML) NASAL DAILY
Qty: 1 EACH | Refills: 0 | OUTPATIENT
Start: 2024-02-08 | End: 2025-02-02

## 2024-02-08 NOTE — TELEPHONE ENCOUNTER
Assessment:     Healthy 9 y o  female child  Wt Readings from Last 1 Encounters:   02/25/21 27 2 kg (60 lb) (68 %, Z= 0 46)*     * Growth percentiles are based on CDC (Girls, 2-20 Years) data  Ht Readings from Last 1 Encounters:   02/25/21 4' 1 5" (1 257 m) (44 %, Z= -0 14)*     * Growth percentiles are based on CDC (Girls, 2-20 Years) data  Body mass index is 17 22 kg/m²  Vitals:    02/25/21 0834   BP: (!) 92/60   Pulse: 86   Temp: 97 8 °F (36 6 °C)       1  Encounter for routine child health examination with abnormal findings     2  Body mass index, pediatric, 5th percentile to less than 85th percentile for age     1  Exercise counseling     4  Nutritional counseling     5  Concussion without loss of consciousness, subsequent encounter          Plan:         1  Anticipatory guidance discussed  Gave handout on well-child issues at this age  Nutrition and Exercise Counseling: The patient's Body mass index is 17 22 kg/m²  This is 76 %ile (Z= 0 70) based on CDC (Girls, 2-20 Years) BMI-for-age based on BMI available as of 2/25/2021  Nutrition counseling provided:  Reviewed long term health goals and risks of obesity  Avoid juice/sugary drinks  5 servings of fruits/vegetables  Exercise counseling provided:  Anticipatory guidance and counseling on exercise and physical activity given  Reduce screen time to less than 2 hours per day  1 hour of aerobic exercise daily  2  Development: appropriate for age    1  Immunizations today: per orders  Discussed with: mother    4  Follow-up visit in 1 year for next well child visit, or sooner as needed  Subjective:     Cem Haas is a 9 y o  female who is here for this well-child visit  Current Issues:  Current concerns include still with headaches from head injury on 2-  Headaches get worse when doing school work but no night waking and no vomiting  No other c/o  Facial pain is better       Well Child Assessment:  History was Spoke with pt,  verified, pt was informed rx ref sent by pharm req 90 days supply.   Pt stated she did not req that, however fluticasone is helping her but she doesn't need it now.   Pt was informed poss auto refill sent by pharm.   Pt stated she saw ENT and ordered CT of the sinuses, she suspected more issue rather than just allergy.    Reviewed below ov notes with pt, she stated understanding.   Rx denied.        LOV 24    ASSESSMENT AND PLAN:   Diagnoses and all orders for this visit:     PND (post-nasal drip)  -     fluticasone propionate 50 MCG/ACT Nasal Suspension; 2 sprays by Each Nare route daily.  Advised patient to take Xyzal for the next 2 weeks, take Flonase for at least 7 to 10 days            Duplicate Refill Request / Refill too soon   provided by the mother  Scott Buenrostro lives with her mother, father, brother and sister  Nutrition  Types of intake include fruits, meats and vegetables (Eating well)  Dental  The patient has a dental home  The patient brushes teeth regularly  The patient flosses regularly  Last dental exam was less than 6 months ago  Elimination  Elimination problems do not include constipation, diarrhea or urinary symptoms  Toilet training is complete  There is no bed wetting  Behavioral  Disciplinary methods include consistency among caregivers, praising good behavior and taking away privileges  Sleep  Average sleep duration is 10 hours  The patient does not snore  There are no sleep problems  Safety  There is no smoking in the home  Home has working smoke alarms? yes  Home has working carbon monoxide alarms? yes  There is a gun in home  School  Current grade level is 2nd  There are no signs of learning disabilities  Child is doing well in school  Screening  Immunizations are up-to-date  There are no risk factors for hearing loss  There are no risk factors for anemia  There are no risk factors for dyslipidemia  There are no risk factors for tuberculosis  There are no risk factors for lead toxicity  Social  The caregiver enjoys the child  After school, the child is at home with a parent  Sibling interactions are good  The child spends 1 hour in front of a screen (tv or computer) per day         The following portions of the patient's history were reviewed and updated as appropriate: allergies, current medications, past family history, past medical history, past social history, past surgical history and problem list     Developmental 6-8 Years Appropriate     Question Response Comments    Can draw picture of a person that includes at least 3 parts, counting paired parts, e g  arms, as one Yes Yes on 2/25/2021 (Age - 7yrs)    Had at least 6 parts on that same picture Yes Yes on 2/25/2021 (Age - 7yrs)    Can appropriately complete 2 of the following sentences: 'If a horse is big, a mouse is   '; 'If fire is hot, ice is   '; 'If mother is a woman, dad is a   ' Yes Yes on 2/25/2021 (Age - 7yrs)    Can catch a small ball (e g  tennis ball) using only hands Yes Yes on 2/25/2021 (Age - 7yrs)    Can balance on one foot 11 seconds or more given 3 chances Yes Yes on 2/25/2021 (Age - 7yrs)    Can copy a picture of a square Yes Yes on 2/25/2021 (Age - 7yrs)    Can appropriately complete all of the following questions: 'What is a spoon made of?'; 'What is a shoe made of?'; 'What is a door made of?' Yes Yes on 2/25/2021 (Age - 7yrs)                Objective:       Vitals:    02/25/21 0834   BP: (!) 92/60   BP Location: Left arm   Patient Position: Sitting   Cuff Size: Child   Pulse: 86   Temp: 97 8 °F (36 6 °C)   TempSrc: Temporal   Weight: 27 2 kg (60 lb)   Height: 4' 1 5" (1 257 m)     Growth parameters are noted and are appropriate for age  No exam data present    Physical Exam  Vitals signs and nursing note reviewed  Exam conducted with a chaperone present (mother)  Constitutional:       General: She is active  Appearance: Normal appearance  She is well-developed and normal weight  HENT:      Head: Normocephalic  Right Ear: Tympanic membrane, ear canal and external ear normal       Left Ear: Tympanic membrane, ear canal and external ear normal       Nose: Nose normal       Mouth/Throat:      Mouth: Mucous membranes are moist       Pharynx: Oropharynx is clear  Eyes:      Extraocular Movements: Extraocular movements intact  Conjunctiva/sclera: Conjunctivae normal       Pupils: Pupils are equal, round, and reactive to light  Neck:      Musculoskeletal: Normal range of motion and neck supple  Cardiovascular:      Rate and Rhythm: Normal rate and regular rhythm  Pulses: Normal pulses  Heart sounds: Normal heart sounds     Pulmonary:      Effort: Pulmonary effort is normal       Breath sounds: Normal breath sounds  Abdominal:      General: Bowel sounds are normal  There is no distension  Palpations: Abdomen is soft  There is no mass  Tenderness: There is no abdominal tenderness  Hernia: No hernia is present  Genitourinary:     General: Normal vulva  Musculoskeletal: Normal range of motion  Skin:     General: Skin is warm  Capillary Refill: Capillary refill takes less than 2 seconds  Neurological:      General: No focal deficit present  Mental Status: She is alert  Cranial Nerves: Cranial nerves are intact  Sensory: Sensation is intact  Motor: Motor function is intact  Coordination: Coordination is intact  Romberg sign negative  Gait: Gait is intact  Deep Tendon Reflexes: Reflexes are normal and symmetric  Reflex Scores:       Patellar reflexes are 2+ on the right side and 2+ on the left side    Psychiatric:         Mood and Affect: Mood normal

## 2024-02-09 ENCOUNTER — PATIENT MESSAGE (OUTPATIENT)
Dept: OTOLARYNGOLOGY | Facility: CLINIC | Age: 49
End: 2024-02-09

## 2024-02-10 ENCOUNTER — PATIENT MESSAGE (OUTPATIENT)
Dept: INTERNAL MEDICINE CLINIC | Facility: CLINIC | Age: 49
End: 2024-02-10

## 2024-02-12 PROCEDURE — 3044F HG A1C LEVEL LT 7.0%: CPT | Performed by: INTERNAL MEDICINE

## 2024-02-12 NOTE — TELEPHONE ENCOUNTER
From: Mindi Bob  To: Melissa Mattson  Sent: 2/10/2024 3:22 PM CST  Subject: STD Panel    I would like to have an STD Panel completed. No current symptoms or reason to be concerned. Just being precautionary/responsible.    Do I need to make an appt or a lab order? If you can advise.    Mindi

## 2024-02-12 NOTE — TELEPHONE ENCOUNTER
From: Mindi Bob  To: Ferny Bauer  Sent: 2/9/2024 3:53 PM CST  Subject: No relief    So, it has been a week since I started the Meloxicam and Prednisone. It has provided no relief. I still have had a headache the entire week.     The only thing giving me any relief is a warm compress on my cheek. Which tends to result in drainage. Headache does not go away, but lessons.    CT scan is scheduled foe Tuesday, but I wanted to provide a week check in that I am not feeling any better.    Mindi

## 2024-02-13 ENCOUNTER — HOSPITAL ENCOUNTER (OUTPATIENT)
Dept: CT IMAGING | Age: 49
Discharge: HOME | End: 2024-02-13
Attending: STUDENT IN AN ORGANIZED HEALTH CARE EDUCATION/TRAINING PROGRAM
Payer: COMMERCIAL

## 2024-02-13 DIAGNOSIS — J32.9 CHRONIC SINUSITIS, UNSPECIFIED LOCATION: ICD-10-CM

## 2024-02-13 PROCEDURE — 70486 CT MAXILLOFACIAL W/O DYE: CPT | Performed by: STUDENT IN AN ORGANIZED HEALTH CARE EDUCATION/TRAINING PROGRAM

## 2024-02-14 ENCOUNTER — TELEPHONE (OUTPATIENT)
Dept: OTOLARYNGOLOGY | Facility: CLINIC | Age: 49
End: 2024-02-14

## 2024-02-16 ENCOUNTER — LAB ENCOUNTER (OUTPATIENT)
Dept: LAB | Age: 49
End: 2024-02-16
Attending: INTERNAL MEDICINE
Payer: COMMERCIAL

## 2024-02-16 ENCOUNTER — OFFICE VISIT (OUTPATIENT)
Dept: INTERNAL MEDICINE CLINIC | Facility: CLINIC | Age: 49
End: 2024-02-16
Payer: COMMERCIAL

## 2024-02-16 VITALS
HEIGHT: 66 IN | HEART RATE: 98 BPM | OXYGEN SATURATION: 98 % | DIASTOLIC BLOOD PRESSURE: 84 MMHG | BODY MASS INDEX: 37.55 KG/M2 | WEIGHT: 233.63 LBS | SYSTOLIC BLOOD PRESSURE: 136 MMHG

## 2024-02-16 DIAGNOSIS — E11.65 TYPE 2 DIABETES MELLITUS WITH HYPERGLYCEMIA, WITHOUT LONG-TERM CURRENT USE OF INSULIN (HCC): ICD-10-CM

## 2024-02-16 DIAGNOSIS — Z11.3 SCREENING EXAMINATION FOR STD (SEXUALLY TRANSMITTED DISEASE): Primary | ICD-10-CM

## 2024-02-16 DIAGNOSIS — Z11.3 SCREENING EXAMINATION FOR STD (SEXUALLY TRANSMITTED DISEASE): ICD-10-CM

## 2024-02-16 LAB — T PALLIDUM AB SER QL IA: NONREACTIVE

## 2024-02-16 PROCEDURE — 3008F BODY MASS INDEX DOCD: CPT | Performed by: INTERNAL MEDICINE

## 2024-02-16 PROCEDURE — 36415 COLL VENOUS BLD VENIPUNCTURE: CPT

## 2024-02-16 PROCEDURE — 3079F DIAST BP 80-89 MM HG: CPT | Performed by: INTERNAL MEDICINE

## 2024-02-16 PROCEDURE — 3075F SYST BP GE 130 - 139MM HG: CPT | Performed by: INTERNAL MEDICINE

## 2024-02-16 PROCEDURE — 86780 TREPONEMA PALLIDUM: CPT

## 2024-02-16 PROCEDURE — 87389 HIV-1 AG W/HIV-1&-2 AB AG IA: CPT

## 2024-02-16 PROCEDURE — 86696 HERPES SIMPLEX TYPE 2 TEST: CPT

## 2024-02-16 PROCEDURE — 86695 HERPES SIMPLEX TYPE 1 TEST: CPT

## 2024-02-16 PROCEDURE — 87491 CHLMYD TRACH DNA AMP PROBE: CPT

## 2024-02-16 PROCEDURE — 99213 OFFICE O/P EST LOW 20 MIN: CPT | Performed by: INTERNAL MEDICINE

## 2024-02-16 PROCEDURE — 87591 N.GONORRHOEAE DNA AMP PROB: CPT

## 2024-02-16 NOTE — PROGRESS NOTES
`Sharri Bob is a 49 year old female.  Chief Complaint   Patient presents with    STD     Wants to get std panel        HPI:   Urgent visit   C/c urgent visit   C/o saw ENT - On prednisone   BP is better   She started dating someone new - 2 mns        HISTORY  Trulicity was thought to make her nauseated but it was a viral inf   Having a difficulty  time --took even half of the ambien and it isnt helping   Had labs drawn through dr joel --testosterone a little high but she she is on testosterone      HISTORY  DJD lumbar spine and cervical spine takes meloxicam and cyclobenzaprine but now has stopped         HISTORY  New pt-- used to see ardha forbes Ward in [facility] -- moved to [place] 5 yrs ago   C/c establish care   C/o needs to establish care with a primary care physician and get checked up, review of systems detailed below with her concerns  Would like to come off of some medications especially the ones that were given for anxiety     PMH  dm2 diag in 2021 -a1c 10.7 in 2021   Hypothyroidism -hashimotots 2019- sees endo at [facility] - dr guillen    Herniated disc in neck - on meloxicam and flexeril--dr ballesteros   Anxiety - on lexapro but was started in 2008 which was a very stressful time  Vit d def -sees dr joel in [facility] - integrative medicine            Lives with two 8 yo twins - girls   Works [employer]                                Current Outpatient Medications   Medication Sig Dispense Refill    predniSONE 20 MG Oral Tab Take 2 tablets (40 mg total) by mouth daily for 5 days, THEN 1.5 tablets (30 mg total) daily for 5 days, THEN 1 tablet (20 mg total) daily for 5 days. 23 tablet 0    Meloxicam 15 MG Oral Tab Take 1 tablet (15 mg total) by mouth at bedtime. 30 tablet 0    Tirzepatide (MOUNJARO) 7.5 MG/0.5ML Subcutaneous Solution Pen-injector Inject 7.5 mg into the skin once a week. 2 mL 2    lisinopril 10 MG Oral Tab Take 1 tablet (10 mg total) by mouth daily. 90 tablet 3    fluticasone propionate 50 MCG/ACT  Nasal Suspension 2 sprays by Each Nare route daily. 1 each 0    traZODone 100 MG Oral Tab Take 1 tablet (100 mg total) by mouth nightly. 90 tablet 3    metFORMIN  MG Oral Tablet 24 Hr Take 1 tablet (500 mg total) by mouth 2 (two) times daily with meals. 180 tablet 3    levothyroxine 75 MCG Oral Tab Take 1 tablet (75 mcg total) by mouth before breakfast. 90 tablet 2    zolpidem 5 MG Oral Tab Take 1 tablet (5 mg total) by mouth nightly as needed for Sleep. 7 tablet 0    progesterone 200 MG Oral Cap Take 1 capsule (200 mg total) by mouth nightly.      TESTOSTERONE BU Place inside cheek.      Emollient (DHEA EX) Apply 50 mg topically.      Lactobacillus (PROBIOTIC ACIDOPHILUS) Oral Tab Take by mouth.      B Complex Vitamins (VITAMIN B COMPLEX OR) Inject as directed.      Nutritional Supplements (IMMUNE ENHANCE OR) Take by mouth.      Magnesium 200 MG Oral Tab Take by mouth.      Cholecalciferol (VITAMIN D3) 250 MCG (78470 UT) Oral Cap         Past Medical History:   Diagnosis Date    Anesthesia complication     Anxiety     Arthritis     Diabetes (HCC)     Disorder of thyroid     Hypothyroidism      + Hashimoto    Obesity 1995    Pseudocholinesterase deficiency       Past Surgical History:   Procedure Laterality Date    Ablation  2021    cervical    Colonoscopy N/A 09/10/2022    Procedure: COLONOSCOPY;  Surgeon: Brooke Claros MD;  Location: [facility] ENDOSCOPY    Benedict biopsy stereo nodule 1 site left (cpt=19081)  2022    CLIP - TOP HAT      2014    Other surgical history Right     meniscus repair     Other surgical history      exp lap     Other surgical history      sinus surg x 2       Social History:  Social History     Socioeconomic History    Marital status: Single   Tobacco Use    Smoking status: Never    Smokeless tobacco: Never   Substance and Sexual Activity    Alcohol use: Yes     Alcohol/week: 2.0 standard drinks of alcohol     Types: 2 Glasses of wine per week     Comment:  occa    Drug use: Never     Comment: occ gummies    Other Topics Concern    Seat Belt Yes        REVIEW OF SYSTEMS:   GENERAL HEALTH: No fevers, chills, sweats, fatigue  RESPIRATORY: denies shortness of breath, cough, wheezing  CARDIOVASCULAR: denies chest pain on exertion, palpitations, swelling in feet  GI: denies abdominal pain and denies heartburn, nausea or vomiting  : No Pain on urination, change in the color of urine, discharge, urinating frequently  NEURO:+  headaches -left sinus ,no anxiety, depression    EXAM:   /84   Pulse 98   Ht 5' 6\" (1.676 m)   Wt 233 lb 9.6 oz (106 kg)   SpO2 98%   BMI 37.70 kg/m²   GENERAL: well developed, well nourished,in no apparent distress   SKIN: no rashes,no suspicious lesions  HEENT: atraumatic, normocephalic   NECK: supple,no adenopathy  LUNGS: clear to auscultation, no wheeze  CARDIO: RRR without murmur  EXTREMITIES: no cyanosis, or edema    ASSESSMENT AND PLAN:   Diagnoses and all orders for this visit:    Screening examination for STD (sexually transmitted disease)  -     T Pallidum Screening [place]; Future  -     HIV Ag/Ab Combo; Future  -     Chlamydia/Gc Amplification; Future  -     HSV 1/2 Subtype by PCR (Lesion only) [E]  -     HSV 1 & 2 Glycoprotein Specific AB,IGG; Future    STD testing ordered per pt request       Type 2 diabetes mellitus with hyperglycemia, without long-term current use of insulin (HCC)    Hba1c 5.6 on 4/2022 and 6.4 on 3/2023 and 6.1 on 6/2023 and 6 on 10/30/2023 and 6.6 on 2/2024 --seeing endo and off josé and on ma  [employer] 9/2023  Eye exam  - [facility] -    Consider Asa , start acei, consider statin   Foot 9/5/2023  Diet -- advised to follow a low carb, low sugar diet , try to be consistent                Exercise 30 min a day                  Preventive medicine  Pap smear she goes to [facility]-5/19/2022 seen in care everywhere  Mammogram - 11/2023   cscope - dr perdue 9/2022 and rpt in 10 yrs   Labs  12/2023, 10/2023 reviewed       The patient indicates understanding of these issues and agrees to the plan.  No follow-ups on file.

## 2024-02-19 LAB
C TRACH DNA SPEC QL NAA+PROBE: NEGATIVE
HSV 1 GLYCOPROTEIN G, IGG: POSITIVE
HSV 2 GLYCOPROTEIN G, IGG: NEGATIVE
N GONORRHOEA DNA SPEC QL NAA+PROBE: NEGATIVE

## 2024-02-20 ENCOUNTER — PATIENT MESSAGE (OUTPATIENT)
Dept: ENDOCRINOLOGY CLINIC | Facility: CLINIC | Age: 49
End: 2024-02-20

## 2024-02-20 DIAGNOSIS — E11.65 TYPE 2 DIABETES MELLITUS WITH HYPERGLYCEMIA, WITHOUT LONG-TERM CURRENT USE OF INSULIN (HCC): Primary | ICD-10-CM

## 2024-02-21 ENCOUNTER — OFFICE VISIT (OUTPATIENT)
Dept: OTOLARYNGOLOGY | Facility: CLINIC | Age: 49
End: 2024-02-21
Payer: COMMERCIAL

## 2024-02-21 ENCOUNTER — PATIENT MESSAGE (OUTPATIENT)
Dept: OTOLARYNGOLOGY | Facility: CLINIC | Age: 49
End: 2024-02-21

## 2024-02-21 DIAGNOSIS — J32.9 CHRONIC SINUSITIS, UNSPECIFIED LOCATION: Primary | ICD-10-CM

## 2024-02-21 DIAGNOSIS — J32.0 LEFT MAXILLARY SINUSITIS: ICD-10-CM

## 2024-02-21 DIAGNOSIS — R51.9 FACIAL PAIN: ICD-10-CM

## 2024-02-21 DIAGNOSIS — R09.82 POSTNASAL DRIP: ICD-10-CM

## 2024-02-21 DIAGNOSIS — J34.2 DEVIATED NASAL SEPTUM: ICD-10-CM

## 2024-02-21 PROCEDURE — 99214 OFFICE O/P EST MOD 30 MIN: CPT | Performed by: STUDENT IN AN ORGANIZED HEALTH CARE EDUCATION/TRAINING PROGRAM

## 2024-02-21 PROCEDURE — 31231 NASAL ENDOSCOPY DX: CPT | Performed by: STUDENT IN AN ORGANIZED HEALTH CARE EDUCATION/TRAINING PROGRAM

## 2024-02-21 RX ORDER — BUDESONIDE 0.5 MG/2ML
INHALANT ORAL
Qty: 60 EACH | Refills: 3 | Status: SHIPPED | OUTPATIENT
Start: 2024-02-21

## 2024-02-21 NOTE — PATIENT INSTRUCTIONS
Endoscopic Sinus Surgery  The sinuses are hollow areas formed by the bones of the face. Normally, a thin layer of mucus drains from the sinuses into the nose. If the drainage path is blocked, problems such as infection can result. Endoscopic sinus surgery can be done to help clear blockages. The healthcare provider uses a thin, lighted tube (endoscope) that's put into your nose. The tube lets the provider see and operate inside your nose and sinuses.  Straightening the septum  You may require straightening of your septum during your surgery. The septum is a piece of cartilage and bone that runs straight down the inside of the nose. It divides the nose into 2 sides. A deviated septum is crooked instead of straight. A crooked septum can cause breathing problems. To fix a deviated septum, the healthcare provider reshapes or trims the cartilage and bone. There's enough septum left for the nose to hold its shape. But the air has more space to move in and out of the nose. This improves your breathing.     Removing polyps  You may or may not have nasal polyps, which are small growths originating from the sinuses. They can grow in both the nose and sinuses. The healthcare provider may use different ways to take them out. Often, the provider uses special tools to take out the polyps without harming nearby tissues. If you have polyps, you will need to remain on nasal rinses indefinitely with nasal steroid solution to prevent their regrowth. Patients with asthma and severe allergies have the highest risk of polyp regrowth.    Opening the sinuses  The sinuses are made up of many small air spaces, like a honeycomb. All of the sinuses have a lining that makes mucus. In some cases, the drainage path is blocked. The healthcare provider may open the thin walls of bone that separate the air spaces. This makes a passage for mucus to drain more easily.     Side view.     Clearing the major outflow pathway of the sinuses  The  osteomeatal complex is a term for a major outflow tract of your sinuses. When this part becomes blocked, you may get symptoms in your maxillary, ethmoid, and frontal sinuses. Opening this area is the main step in most sinus surgeries. The uncinate process is a small piece of bone and tissue in the sinuses. It forms an outlet for part of the sinuses. If this tissue is swollen (inflamed), it will block drainage of mucus. The healthcare provider may take out the uncinate process so that mucus can drain.      Risks  As with any surgery, nasal surgery has some risks. These include a risk of bleeding (less than 5% will need return to the operating room for cauterization), infection, persistent nasal crusting, and risks of anesthesia. There is a very small risk of brain fluid leak from your nose (CSF leak). Your breathing will likely be much better after surgery, however patients with severe allergies may still experience nasal congestion intermittently. Your breathing may not be perfectly equal on each side of your nose following surgery. The following are the possible risks.  Bleeding  Infection  Scar formation inside the nose  Tear duct injury (excessive tears)  Voice Change  Possible Cerebrospinal (brain) fluid leak requiring repair  Nasal Deformity  Septal perforation or hematoma  Dry Nose or Excessive Crusting  Need for revision surgery  Risks of anesthesia (Your anesthesiologist will discuss these with you before the start of the surgery)    After Sinus Surgery  After septoplasty, you’ll be taken to a recovery area or to your hospital room. Your experience may be as follows:   You will have plastic splints inside of your nose with a suture holding it in place. This reduces bleeding and helps with healing. You may also have bandages (dressings) on the outside of your nose for the first 3-5 days after surgery  It’s normal to have some mucus and blood drain from your nose. Until packing is removed, you may have to  breathe through your mouth.  Avoid blowing your nose for 2 weeks after surgery, and if you have to sneeze, do so with your mouth open  You may have some swelling or bruising around your eyes, although this is very rare  Expect some throat dryness and irritation.  You will likely have some numbness of the upper teeth and gums which is expected. This may take up to 3 months to return to normal.  Pain medicine will be prescribed as needed.  You will be prescribed Afrin nasal spray (Use 2 sprays in each nostril, twice daily for the first 3 days after surgery)  You will be prescribed nasal saline rinses (We recommend Neilmed Sinus Rinse bottle with saline packets; PLEASE USE DISTILLED WATER; You will start this on day 4 after surgery, and continue until you are told to stop by your surgeon)  You will also be prescribed antibiotics to take for 7-10 days after surgery        Follow-up care  You’ll need to follow up with your healthcare provider after your surgery. Here's what to expect:   Any splints or packing will be removed around 1 week after surgery. Your surgeon will also clean your nose out using instruments in the office. You may take 1 dose of the prescribed pain medicine prior to the appointment if you have someone to drive you to and from the office. Please avoid driving while on pain medication.  After the splint or packing is removed, you’ll most likely breathe better than you did before surgery.  You may have minor numbness, pain, swelling, and a little stiffness under the tip of the nose.  In a few days, the inside of your nose may swell. Or a scab or crust may make it hard to breathe through your nose again. Leave the scab alone. Your provider will remove it during a follow up visit. Using saline (irrigation or aerosol) regularly after surgery helps to reduce the amount of crusting at each visit.  You will be required to follow up on weeks 1, 2, 4, 8, and 20, after surgery for nose cleanings and to ensure  healing, as well as to manage you nasal medications for long lasting results.  You will then be seen annually from that point  Contact your surgeon if you have any questions or concerns.    [employer] last reviewed this educational content on 10/1/2021  © 8533-9763 [employer]. All rights reserved. This information is not intended as a substitute for professional medical care. Always follow your healthcare professional's instructions.

## 2024-02-21 NOTE — TELEPHONE ENCOUNTER
Please see triage info below and sugars sent by the patient via [employer] link. However she could only provide average sugars.

## 2024-02-21 NOTE — TELEPHONE ENCOUNTER
From: Mindi Bob  To: Sharla Stuart  Sent: 2/20/2024 3:36 PM CST  Subject: Metformin change and Mounjaro    Hi Dr. Stuart,    Hope you are doing well and enjoying the mild weather. I have two items for your consideration.    Per our discussion, I switched my metformin from 500 AM / 500 PM to 1,000 PM. The result has been my BS being high by mid/late afternoon (200-250s!) The highest is when I do not eat enough, but still very high. I am wondering if this could be, in part, due to my switch from 3mg Trulicity to the .75 mg of Mounjaro.    I, personally, would like to increase my Mounjaro. I have 1 shot left to take on Thursday, so we need to refill regardless.    If you could advise on Metformin and Mounjaro dosages.    Thank you,    Mindi Bob

## 2024-02-21 NOTE — TELEPHONE ENCOUNTER
Hyperglycemia     Onset of hyperglycemia: about 2.5 weeks ago. She was given a short course of prednisone for a URI, now off the steroids.    BG levels (please print out CGM and/or pump report if patient is wearing one): Patient checking via fingerstick. States will send sugars via [employer], will await sugars.     Symptoms (RN only: N/V, abdominal pain and/or radiating to the back, blurry vision, increased urinary frequency, blurred vision, CP, SOB): Having some nausea after mounjaro injections but it resolves. Having some constipation. No other symptoms.     Pattern of hyperglycemia: Sugars tended to be elevated in the afternoons around 3pm.     Steroid therapy:  short course prednisone 2.5 weeks ago. Now off.     Acute Illness: none    Change in Diet: no change    List DM Medications/Compliance:    Metformin ER 1000 mg every evening --> she states that she switched back to taking metformin  mg BID yesterday, as she feels taking only in the evening did not work well.   Mounjaro 7.5 mg weekly --> confirmed taking, she would like to try increasing the dose.       Will await sugars prior to sending to MD for advice.

## 2024-02-21 NOTE — PROGRESS NOTES
[facility]  OTOLARYNGOLOGY - HEAD & NECK SURGERY    2024     Reason for Consultation:   Chronic left maxillary sinusitis, left facial pain    History of Present Illness:   Patient is a pleasant 49 year old female who is being seen for severe left facial pain over the last few months.  She does have a history of chronic sinusitis and had sinus surgery by an ENT in the city.  Since December she had a sinus infection and then developed significant left-sided cheek pain.  She has seen a dentist recently and was noted to have a normal x-ray of her teeth.  She was seen by Dr. Bauer as well and he recommended to follow-up with me as she may benefit from endoscopic left modified medial maxillectomy.  The patient's pain was so severe at times that she had been reliant on pain medication.  She is here for further evaluation of her left cheek pain.  She does have problems with postnasal drip as well and nasal itching, which improves with allergy medication.  She has had multiple courses of antibiotics, prednisone, mupirocin rinses, fluticasone nasal spray, which she has done for many months.    Past Medical History  Past Medical History:   Diagnosis Date    Anesthesia complication     Anxiety     Arthritis     Diabetes (HCC)     Disorder of thyroid     Hypothyroidism     - + Hashimoto    Obesity 1995    Pseudocholinesterase deficiency        Past Surgical History  Past Surgical History:   Procedure Laterality Date    ABLATION  2021    cervical    COLONOSCOPY N/A 09/10/2022    Procedure: COLONOSCOPY;  Surgeon: Brooke Claros MD;  Location: [facility] ENDOSCOPY    ANUP BIOPSY STEREO NODULE 1 SITE LEFT (CPT=19081)  2022    CLIP - TOP HAT      2014    OTHER SURGICAL HISTORY Right     meniscus repair     OTHER SURGICAL HISTORY      exp lap     OTHER SURGICAL HISTORY      sinus surg x 2        Family History  Family History   Problem Relation Age of Onset    Heart Disorder Father     Hypertension Father      Depression Mother     Obesity Mother     Diabetes Paternal Grandmother        Social History  Pediatric History   Patient Parents    John Bob (Father)     Other Topics Concern    Caffeine Concern Not Asked    Exercise Not Asked    Seat Belt Yes    Special Diet Not Asked    Stress Concern Not Asked    Weight Concern Not Asked   Social History Narrative    Not on file           Current Medications:  Current Outpatient Medications   Medication Sig Dispense Refill    budesonide 0.5 MG/2ML Inhalation Suspension Add to neilmed sinus rinse bottle along with distilled water to the line. Use half the bottle to irrigate one nasal cavity, and the other half to irrigate the other nasal cavity. Do this twice a day 60 each 3    Meloxicam 15 MG Oral Tab Take 1 tablet (15 mg total) by mouth at bedtime. 30 tablet 0    Tirzepatide (MOUNJARO) 7.5 MG/0.5ML Subcutaneous Solution Pen-injector Inject 7.5 mg into the skin once a week. 2 mL 2    lisinopril 10 MG Oral Tab Take 1 tablet (10 mg total) by mouth daily. 90 tablet 3    fluticasone propionate 50 MCG/ACT Nasal Suspension 2 sprays by Each Nare route daily. 1 each 0    traZODone 100 MG Oral Tab Take 1 tablet (100 mg total) by mouth nightly. 90 tablet 3    metFORMIN  MG Oral Tablet 24 Hr Take 1 tablet (500 mg total) by mouth 2 (two) times daily with meals. 180 tablet 3    levothyroxine 75 MCG Oral Tab Take 1 tablet (75 mcg total) by mouth before breakfast. 90 tablet 2    zolpidem 5 MG Oral Tab Take 1 tablet (5 mg total) by mouth nightly as needed for Sleep. 7 tablet 0    progesterone 200 MG Oral Cap Take 1 capsule (200 mg total) by mouth nightly.      TESTOSTERONE BU Place inside cheek.      Emollient (DHEA EX) Apply 50 mg topically.      Lactobacillus (PROBIOTIC ACIDOPHILUS) Oral Tab Take by mouth.      B Complex Vitamins (VITAMIN B COMPLEX OR) Inject as directed.      Nutritional Supplements (IMMUNE ENHANCE OR) Take by mouth.      Magnesium 200 MG Oral Tab Take by mouth.       Cholecalciferol (VITAMIN D3) 250 MCG (80694 UT) Oral Cap          Allergies  Allergies   Allergen Reactions    Succinylcholine OTHER (SEE COMMENTS) and UNKNOWN     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       Review of Systems:   A comprehensive 10 point review of systems was completed.  Pertinent positives and negatives noted in the the HPI.    Physical Exam:   not currently breastfeeding.    GENERAL: No acute distress, Comfortable appearing  FACE: HB 1/6, Normal Animation  HEAD: Normocephalic  EYES: EOMI, pupils equil  EARS: Bilateral Auricles Symmetric, bilateral tympanic membranes normal  NOSE: Nares patent bilaterally  ORAL CAVITY: Tongue mobile, Oropharynx clear, Floor of mouth clear, Posterior oropharynx normal  NECK: No palpable lymphadenopathy, thyroid not palpable, nontender    PROCEDURE: BILATERAL RIGID NASAL ENDOSCOPY  Bilateral rigid nasal endoscopy (81357) was performed. Verbal consent was obtained from the patient to proceed with rigid nasal endoscopy. The nasal cavity was decongested and topically anesthetized with a combination of Oxymetazoline and 4% Lidocaine. A rigid 4mm 30 degree nasal endoscope connected to a high-definition endoscopy system was used to examine both nasal cavities. Digital photos and/or videos of relevant exam findings were obtained. The inferior meatus, inferior turbinate, nasopharynx, middle meatus, middle turbinate, superior meatus, superior turbinate, and sphenoethmoidal recess were examined bilaterally and deemed to be normal, with any exceptions as noted below. At the completion of the procedure the endoscope was removed. The patient tolerated the procedure well. There were no complications.    Findings: The bilateral inferior turbinates were enlarged. The Septum was deviated to the left caudally. The middle meatus was patent bilaterally. There were no obvious masses or polyps noted.      Results:     Laboratory Data:  Lab Results    Component Value Date    CREATSERUM 0.93 01/28/2021    BUN 14 01/28/2021     (L) 01/28/2021    K 4.3 01/28/2021     01/28/2021    CO2 27.0 01/28/2021     (H) 01/28/2021    CA 8.9 01/28/2021    ALB 3.7 01/28/2021    ALKPHO 91 01/28/2021    TP 7.8 01/28/2021    AST 13 (L) 01/28/2021    ALT 21 01/28/2021    T4F 1.3 01/27/2021    TSH 1.780 01/27/2021         Imaging:  CT SINUS STEALTH ENT SH(CPT=70486)    Result Date: 2/13/2024  PROCEDURE: CT SINUS STEALTH ENT SH (CPT=70486)  COMPARISON: None.  INDICATIONS: Chronic sinusitis, unspecified location. Lt sided facial pain.  TECHNIQUE: Multidetector CT images centered about the paranasal sinuses were acquired without the infusion of intravenous contrast. Automated exposure control for dose reduction was used.   FINDINGS:  POSTSURGICAL: Functional endoscopic sinus surgery changes with bilateral maxillary antrostomy/uncinectomy and suspected bilateral internal ethmoidectomy. MAXILLARY SINUSES: Mild dependent left maxillary sinus mucosal thickening with mild surrounding osseous sclerosis as well as low-grade intrinsic calcifications.  Tiny dependent right maxillary sinus retention cyst. ETHMOID SINUSES: There is no significant mucosal thickening or fluid. The lamina papyracea are symmetric and intact.  SPHENOID SINUSES:  Small right sphenoid sinus retention cyst. FRONTAL SINUSES:  No significant mucosal thickening or fluid is demonstrated.  DRAINAGE: Postsurgical maxillary antrostomies are widely patent bilaterally.  Native sphenoid ostia and frontal recesses are also patent bilaterally. NASAL FOSSA: Slight rightward nasal septal deviation.  Small bilateral carolina lamella. The olfactory clefts are well aerated. Anterior skull base is symmetric. BONES:   No fractures or osseous lesions are evident.  OTHER:  There is a right palatine tonsillith.         CONCLUSION:  1. Functional endoscopic sinus surgery with bilateral maxillary antrostomy/uncinectomy and  suspected bilateral internal ethmoidectomy. 2. Mild dependent right maxillary sinus mucosal thickening with superimposed low-grade calcifications and mild surrounding osseous sclerosis.  Findings suggest low-grade sequelae of chronic left maxillary sinusitis with potential coexistent fungal colonization/mycetoma. 3. Remaining paranasal sinuses are well aerated, with the exception of small right maxillary and right sphenoid sinus retention cysts. 4. Postsurgical maxillary antrostomies are widely patent bilaterally.  Similarly, the native sphenoid ostia and frontal recesses are widely patent bilaterally. 5. Slight rightward deviation of the nasal septum.  There are bilateral carolina lamella.   elm-remote  Dictated by (CST): Keron Lutz MD on 2/13/2024 at 5:03 PM     Finalized by (CST): Keron Lutz MD on 2/13/2024 at 5:09 PM              Impression:   Left chronic sinusitis  Possible left maxillary odontogenic sinusitis  Left maxillary osteitis  Facial pain, severe    Recommendations:  I discussed with the patient that there is a tooth root within the left maxillary sinus adjacent to the osteitis which may be part of the issue.  She has seen a dentist for this and was told that her teeth look fine.  I will have her see an oral surgeon for another opinion.  In the meantime I will start her on budesonide nasal rinses to do twice a day.  I will tentatively hold a date in April for surgery.  I discussed with her the risks of surgery which include postoperative pain, postoperative crusting, excessive tearing, nasal bleeding, and need for further surgery.  I have provided her with a handout on MyChart which discusses more of the risks of surgery.  She will review this and she will see me back after her evaluation with oral surgery.    Thank you for allowing me to participate in the care of your patient.    Alexey Malik, DO   Otolaryngology/Rhinology, Sinus, and Endoscopic Skull Base Surgery  [facility]  [address] [address]  Phone [phone]  Fax [phone]  2/21/2024  8:44 AM  2/21/2024

## 2024-02-24 RX ORDER — TIRZEPATIDE 10 MG/.5ML
10 INJECTION, SOLUTION SUBCUTANEOUS WEEKLY
Qty: 2 ML | Refills: 2 | Status: SHIPPED | OUTPATIENT
Start: 2024-02-24 | End: 2024-02-28 | Stop reason: RX

## 2024-02-26 RX ORDER — MELOXICAM 15 MG/1
15 TABLET ORAL NIGHTLY
Qty: 30 TABLET | Refills: 0 | Status: SHIPPED | OUTPATIENT
Start: 2024-02-26

## 2024-02-28 RX ORDER — TIRZEPATIDE 7.5 MG/.5ML
7.5 INJECTION, SOLUTION SUBCUTANEOUS
Qty: 6 ML | Refills: 0 | Status: SHIPPED | OUTPATIENT
Start: 2024-02-28

## 2024-02-28 NOTE — TELEPHONE ENCOUNTER
Prescription approved for 7.5mg dose of Mounjaro for 3 months. Let's have her increase Metformin from 500mg BID to 1000mg BID. Thanks.

## 2024-02-28 NOTE — TELEPHONE ENCOUNTER
Dr. Stuart,     Please see below. RN pended Mounjaro 7.5 mg weekly per patient's request since 10 mg is not available.  Please advise regarding metformin dosing now that she cannot get the 10 mg mounjaro.  Currently taking metformin  mg BID.  Thank you.

## 2024-02-29 ENCOUNTER — PATIENT MESSAGE (OUTPATIENT)
Dept: INTERNAL MEDICINE CLINIC | Facility: CLINIC | Age: 49
End: 2024-02-29

## 2024-03-01 RX ORDER — METFORMIN HYDROCHLORIDE 500 MG/1
1000 TABLET, EXTENDED RELEASE ORAL 2 TIMES DAILY WITH MEALS
Qty: 360 TABLET | Refills: 0 | Status: SHIPPED | OUTPATIENT
Start: 2024-03-01

## 2024-03-01 NOTE — TELEPHONE ENCOUNTER
Ok to double and will d/w further at [facility] upcoming apt    10-Nov-2017 14:39

## 2024-03-01 NOTE — TELEPHONE ENCOUNTER
From: Mindi Bob  To: Melissa Mattson  Sent: 2/29/2024 2:19 PM CST  Subject: Anxiety    Hey, hope you are well. I have been feeling anxious this week. Work. Can I take extra Trazedone to help or no?    Appreciate advise.    Mindi

## 2024-03-09 NOTE — TELEPHONE ENCOUNTER
From: Alexey Malik  To: Mindi Bob  Sent: 2/21/2024 10:39 AM CST  Subject: Oral surgery referral    Hi Mindi,     I found an oral surgeon that is part of our system. He will also have access to your CT scan images so there will be no reason for you to printed out for him. Lets see what his opinion is! I placed the referral through your MyChart    Best,     Dr. Malik

## 2024-04-23 NOTE — PROGRESS NOTES
Name: Mindi Bob  Date: 4/24/24    Referring Physician: No ref. provider found    HISTORY OF PRESENT ILLNESS   Mindi Bob is a 49 year old female who presents for evaluation and management of type 2 diabetes. She was diagnosed with diabetes about 2 years ago. I had started patient on Mounjaro and she had been doing well on the 7.5mg dose. I had increased it to the 10mg dose and now she is feeling very nauseous. She is also having trouble the medication in general.     Blood Glucose Today: 130  HbA1C or glycohemoglobin is 5.7 today (and it was 6.4 on 1/17/24 and it was 6.0 on 10/30/23)  Type 1 or Type 2?: Type 2  Medications for DM: Trulicity 3.0mg qweekly (1 year); Metformin ER 500mg PO bid  Checking a few times a week (150 is the highest in the middle of the day an hour after she ate)   Episodes of hypoglycemia: none    Dietary compliance: good    Exercise: walks the dog everyday for 30 minutes    Polyuria/polydipsia: none    Blurred vision: none    Flu Vaccine This Season: yes    Covid Vaccine: yes    REVIEW OF SYSTEMS  CV: Cardiovascular disease present: none         Hypertension present: at goal, on meds         Hyperlipidemia present: not at goal, not on meds (2/12/24)         Peripheral Vascular Disease present: none    : Nephropathy present: MAC: none (9/05/23); Creatinine: 0.85 (2/12/24)     Neuro: Neuropathy present: none    Eyes: Diabetic retinopathy present: none            Most recent visit to eye doctor in last 12 months: yes, recently    Skin: Infection or ulceration: none    Osteoporosis/ Osteopenia: none Vitamin D: 75.7 (2/12/24)    Thyroid disease: yes; TSH: 0.43 (2/12/24- she is on 75mcg of levothyroxine)    Medications:     Current Outpatient Medications:     MOUNJARO 10 MG/0.5ML Subcutaneous Solution Pen-injector, , Disp: , Rfl:     metFORMIN  MG Oral Tablet 24 Hr, Take 2 tablets (1,000 mg total) by mouth 2 (two) times daily with meals., Disp: 360 tablet, Rfl: 0    levothyroxine  75 MCG Oral Tab, Take 1 tablet (75 mcg total) by mouth before breakfast., Disp: 90 tablet, Rfl: 2    traZODone 100 MG Oral Tab, Take 1.5 tablets (150 mg total) by mouth nightly., Disp: 135 tablet, Rfl: 3    busPIRone 10 MG Oral Tab, Take 1 tablet (10 mg total) by mouth in the morning and 1 tablet (10 mg total) before bedtime., Disp: 180 tablet, Rfl: 3    lisinopril 10 MG Oral Tab, Take 1 tablet (10 mg total) by mouth daily., Disp: 90 tablet, Rfl: 3    zolpidem 5 MG Oral Tab, Take 1 tablet (5 mg total) by mouth nightly as needed for Sleep., Disp: 7 tablet, Rfl: 0    progesterone 200 MG Oral Cap, Take 1 capsule (200 mg total) by mouth nightly., Disp: , Rfl:     TESTOSTERONE BU, Place inside cheek., Disp: , Rfl:     Emollient (DHEA EX), Apply 50 mg topically., Disp: , Rfl:     Lactobacillus (PROBIOTIC ACIDOPHILUS) Oral Tab, Take by mouth., Disp: , Rfl:     B Complex Vitamins (VITAMIN B COMPLEX OR), Inject as directed., Disp: , Rfl:     Magnesium 200 MG Oral Tab, Take by mouth., Disp: , Rfl:     Cholecalciferol (VITAMIN D3) 250 MCG (52441 UT) Oral Cap, , Disp: , Rfl:      Allergies:   Allergies   Allergen Reactions    Succinylcholine OTHER (SEE COMMENTS) and UNKNOWN     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       Social History:   Social History     Socioeconomic History    Marital status: Single   Tobacco Use    Smoking status: Never    Smokeless tobacco: Never   Substance and Sexual Activity    Alcohol use: Yes     Alcohol/week: 2.0 standard drinks of alcohol     Types: 2 Glasses of wine per week     Comment: occa    Drug use: Never     Comment: occ gummies    Other Topics Concern    Seat Belt Yes       Medical History:   Past Medical History:    Anesthesia complication    Anxiety    Arthritis    Diabetes (HCC)    Disorder of thyroid    Hypothyroidism    1-2021 + Hashimoto    Obesity    Pseudocholinesterase deficiency       Surgical history:   Past Surgical History:   Procedure  Laterality Date    Ablation  2021    cervical    Colonoscopy N/A 09/10/2022    Procedure: COLONOSCOPY;  Surgeon: Brooke Claros MD;  Location: [facility] ENDOSCOPY    Benedict biopsy stereo nodule 1 site left (cpt=19081)  2022    CLIP - TOP HAT      2014    Other surgical history Right     meniscus repair     Other surgical history      exp lap     Other surgical history      sinus surg x 2          PHYSICAL EXAM  Vitals:    24 1142   BP: 99/71   Pulse: 75   Weight: 233 lb (105.7 kg)   Height: 5' 6\" (1.676 m)         General Appearance:  alert, well developed, in no acute distress  Eyes:  normal conjunctivae, sclera., normal sclera and normal pupils  Ears/Nose/Mouth/Throat/Neck:  no palpable thyroid nodules or cervical lymphadenopathy  Neck: Trachea midline: Normal  Psychiatric:  oriented to time, self, and place  Nutritional:  no abnormal weight gain or loss    Lab Data:   Lab Results   Component Value Date     (H) 2021    A1C 6.4 (A) 2024     Lab Results   Component Value Date     (H) 2021    BUN 14 2021    BUNCREA 15.1 2021    CREATSERUM 0.93 2021    ANIONGAP 5 2021    GFRNAA 84 2024    GFRAA 85 2021    CA 8.9 2021    OSMOCALC 289 2021    ALKPHO 91 2021    AST 13 (L) 2021    ALT 21 2021    BILT 0.5 2021    TP 7.8 2021    ALB 3.7 2021    GLOBULIN 4.1 2021     (L) 2021    K 4.3 2021     2021    CO2 27.0 2021     Lab Results   Component Value Date    CHOLEST 167 06/15/2020    TRIG 140 2022    HDL 55 2022    LDL 98 06/15/2020    VLDL 33 (H) 06/15/2020    NONHDLC 122 2022     Lab Results   Component Value Date    MALBP <0.50 2023    CREUR 44.80 2023         ASSESSMENT/PLAN:  This is a 49 year-old woman here for evaluation and management of uncontrolled type 2 diabetes. We discussed the ABCs of DM.     1.)  Hyperglycemia Management- We discussed the importance of glycemic control to prevent complications of diabetes. We discussed the importance of SBGM. I offered and provided patient education materials and offered a blood glucose log book.   - I have asked her to stop the metformin for now, as it may be contributing to the nausea.   - If she can find the Mounjaro 7.5mg she can take this  - I have prescribed the Trulicity 3mg qweekly for now  - Continue checking blood sugars fasting and 2 hours after biggest meal    2.) Management of Diabetic Complications- We discussed the complications of diabetes include retinopathy, neuropathy, nephropathy and cardiovascular disease.   - Ophtho- up to date  - Flu and Covid vaccine- up to date  - BP- at goal, on meds  - Lipids- check in 3 months  - MAC- check in 3 months  - CMP- check in 3 months  - Neuropathy- none  - CAD- none    3.) Lifestyle Management for Diabetes- We discussed importance of a low CHO diet, and recommend 45gm per meal or 135gm per day. We discussed the importance of trying to follow a Mediterranean diet, with an emphasis on vegetables at every meal, with lots whole grains, and protein from either plant-based sources, or poultry and fish.   - Diet- eats healthy  - Exercise- will exercise more    Return to clinic in 3 months    Prior to this encounter, I spent over 15 minutes with preparing for the visit, including reviewing documents from other specialties as well as from PCP and going over test results and imaging studies. During the face to face encounter, I spent an additional 15 minutes which were determined for follow-up. Greater than 50% of the time was spent in counseling, anticipatory guidance, and coordination of care. Patient concerns were answered to the best of my knowledge.       4/24/24  Sharla Stuart MD

## 2024-04-24 ENCOUNTER — OFFICE VISIT (OUTPATIENT)
Dept: ENDOCRINOLOGY CLINIC | Facility: CLINIC | Age: 49
End: 2024-04-24
Payer: COMMERCIAL

## 2024-04-24 VITALS
BODY MASS INDEX: 37.45 KG/M2 | SYSTOLIC BLOOD PRESSURE: 99 MMHG | HEIGHT: 66 IN | DIASTOLIC BLOOD PRESSURE: 71 MMHG | HEART RATE: 75 BPM | WEIGHT: 233 LBS

## 2024-04-24 DIAGNOSIS — E11.65 TYPE 2 DIABETES MELLITUS WITH HYPERGLYCEMIA, WITHOUT LONG-TERM CURRENT USE OF INSULIN (HCC): Primary | ICD-10-CM

## 2024-04-24 DIAGNOSIS — E03.9 ACQUIRED HYPOTHYROIDISM: ICD-10-CM

## 2024-04-24 DIAGNOSIS — E78.5 DYSLIPIDEMIA: ICD-10-CM

## 2024-04-24 LAB
CARTRIDGE EXPIRATION DATE: ABNORMAL DATE
GLUCOSE BLOOD: 130
HEMOGLOBIN A1C: 5.7 % (ref 4.3–5.6)
TEST STRIP LOT #: NORMAL NUMERIC

## 2024-04-24 PROCEDURE — 3008F BODY MASS INDEX DOCD: CPT | Performed by: INTERNAL MEDICINE

## 2024-04-24 PROCEDURE — 99214 OFFICE O/P EST MOD 30 MIN: CPT | Performed by: INTERNAL MEDICINE

## 2024-04-24 PROCEDURE — 82947 ASSAY GLUCOSE BLOOD QUANT: CPT | Performed by: INTERNAL MEDICINE

## 2024-04-24 PROCEDURE — 83036 HEMOGLOBIN GLYCOSYLATED A1C: CPT | Performed by: INTERNAL MEDICINE

## 2024-04-24 PROCEDURE — 3044F HG A1C LEVEL LT 7.0%: CPT | Performed by: INTERNAL MEDICINE

## 2024-04-24 PROCEDURE — 3074F SYST BP LT 130 MM HG: CPT | Performed by: INTERNAL MEDICINE

## 2024-04-24 PROCEDURE — 3078F DIAST BP <80 MM HG: CPT | Performed by: INTERNAL MEDICINE

## 2024-04-24 RX ORDER — DULAGLUTIDE 3 MG/.5ML
3 INJECTION, SOLUTION SUBCUTANEOUS WEEKLY
Qty: 6 ML | Refills: 3 | Status: SHIPPED | OUTPATIENT
Start: 2024-04-24 | End: 2024-07-23

## 2024-05-20 ENCOUNTER — TELEPHONE (OUTPATIENT)
Dept: INTERNAL MEDICINE CLINIC | Facility: CLINIC | Age: 49
End: 2024-05-20

## 2024-05-20 DIAGNOSIS — I10 PRIMARY HYPERTENSION: ICD-10-CM

## 2024-05-20 DIAGNOSIS — F41.9 ANXIETY: ICD-10-CM

## 2024-05-20 DIAGNOSIS — Z72.820 POOR SLEEP: ICD-10-CM

## 2024-05-20 RX ORDER — BUSPIRONE HYDROCHLORIDE 10 MG/1
10 TABLET ORAL 2 TIMES DAILY
Qty: 180 TABLET | Refills: 3 | Status: SHIPPED | OUTPATIENT
Start: 2024-05-20 | End: 2025-05-15

## 2024-05-20 RX ORDER — TRAZODONE HYDROCHLORIDE 100 MG/1
150 TABLET ORAL NIGHTLY
Qty: 135 TABLET | Refills: 3 | Status: SHIPPED | OUTPATIENT
Start: 2024-05-20 | End: 2025-05-15

## 2024-05-20 RX ORDER — LISINOPRIL 10 MG/1
10 TABLET ORAL DAILY
Qty: 90 TABLET | Refills: 2 | Status: SHIPPED | OUTPATIENT
Start: 2024-05-20 | End: 2025-05-15

## 2024-05-20 NOTE — TELEPHONE ENCOUNTER
Refill requests for the following            traZODone 100 MG Oral Tab, Take 1.5 tablets (150 mg total) by mouth nightly., Disp: 135 tablet, Rfl: 3      busPIRone 10 MG Oral Tab, Take 1 tablet (10 mg total) by mouth in the morning and 1 tablet (10 mg total) before bedtime., Disp: 180 tablet, Rfl: 3      lisinopril 10 MG Oral Tab, Take 1 tablet (10 mg total) by mouth daily., Disp: 90 tablet, Rfl: 3    :

## 2024-05-31 DIAGNOSIS — E11.65 TYPE 2 DIABETES MELLITUS WITH HYPERGLYCEMIA, WITHOUT LONG-TERM CURRENT USE OF INSULIN (HCC): ICD-10-CM

## 2024-05-31 NOTE — TELEPHONE ENCOUNTER
Endocrine Refill protocol for metformin    Protocol Criteria:    -Appointment with Endocrinology completed in the last 6 months or scheduled in the next 3 months    -GFR greater than or equal to 40 in the past 12 months     -A1c result <8.5% in the past 6 months      Verify the above has been completed or scheduled in the appropriate timeline. If so can send a 90 day supply with 1 refill.       Last completed office visit:4/24/2024  Next scheduled Follow up: No future appt      Last GFR result: 84      Last A1c result:5.7

## 2024-06-01 RX ORDER — METFORMIN HYDROCHLORIDE 500 MG/1
1000 TABLET, EXTENDED RELEASE ORAL 2 TIMES DAILY WITH MEALS
Qty: 120 TABLET | Refills: 2 | Status: SHIPPED | OUTPATIENT
Start: 2024-06-01

## 2024-06-26 NOTE — TELEPHONE ENCOUNTER
BP Hospitalist History and Physical    Chief Complaint   Patient presents with    Fall    Alcohol Problem    Depression       History Of Present Illness  This is a 78 year old male, patient of Hector Bender MD, with past medical history of alcohol use disorder, hypertension, anxiety, lumbar stenosis and hyperlipidemia who came to the ER after admitting to drinking all day and fell, tripped in his garage.  Patient landed face first and had a laceration to the forehead with a small cut under the right eye.  Patient was sober for quite some time and started going through a depressive episode and relapse recently.  Head was obtained which showed chronic ischemic changes parenchymal hemorrhage edema mass effect midline shift.  CT cervical spine without contrast was obtained which showed no fracture or malalignment.  Laceration was repaired on the forehead in the ER.  EKG showed normal sinus rhythm.  Patient was not suicidal.  Seen today and he was requesting to be discharged. He will be going to her daughter home to live for now and later to his own place in [place].  He agreed to f/u with his PCP for Urine Cx results      Past Medical History  Past Medical History:   Diagnosis Date    Anxiety     Asthma (CMD)     as a child    Colon cancer screening 6/4/2018    Gastroesophageal reflux disease     Glaucoma     Medicare annual wellness visit, subsequent 8/24/2020    Preoperative examination 5/23/2022        Surgical History  Past Surgical History:   Procedure Laterality Date    Eye surgery Left     cataract    Knee surgery      Lumbar fusion          Social History  Social History     Tobacco Use    Smoking status: Never    Smokeless tobacco: Never   Vaping Use    Vaping status: never used   Substance Use Topics    Alcohol use: Yes     Alcohol/week: 5.0 standard drinks of alcohol     Types: 1 Glasses of wine, 3 Cans of beer, 1 Shots of liquor per week     Comment: Per week    Drug use: Never     Comment: 6/26/24  Rescheduled patient. Urine positive for Benzos. Denies drug usage.       Family History    Family History   Problem Relation Age of Onset    Anxiety disorder Mother         Allergies  ALLERGIES:  Penicillins, Sulfa antibiotics, Mold   (environmental), and Seasonal    Medications  Medications Prior to Admission   Medication Sig Dispense Refill    omeprazole (PriLOSEC) 40 MG capsule TAKE 1 CAPSULE BY MOUTH DAILY 90 capsule 0    simvastatin (ZOCOR) 40 MG tablet TAKE ONE TABLET BY MOUTH AT BEDTIME 90 tablet 0    folic acid (FOLATE) 1 MG tablet Take 1 tablet by mouth daily. Do not start before November 8, 2022. 30 tablet 0    ALPRAZolam (XANAX) 0.5 MG tablet Take 0.5 mg by mouth 2 times daily as needed for Anxiety.      escitalopram (LEXAPRO) 10 MG tablet Take 10 mg by mouth every morning. Indications: Major Depressive Disorder       latanoprost (XALATAN) 0.005 % ophthalmic solution instilli 1 drop into both eyes every night at bedtime         Review of Systems  Review of Systems   Constitutional:  Positive for activity change. Negative for appetite change, chills, diaphoresis, fatigue, fever and unexpected weight change.   HENT: Negative.     Eyes: Negative.    Respiratory: Negative.     Cardiovascular: Negative.    Gastrointestinal: Negative.    Endocrine: Negative.    Genitourinary: Negative.    Musculoskeletal: Negative.    Skin:  Positive for wound.   Neurological: Negative.    Hematological: Negative.          Last Recorded Vitals  Vital Last Value 24 Hour Range   Temperature 97.9 °F (36.6 °C) (06/26/24 0542) Temp  Min: 97.9 °F (36.6 °C)  Max: 97.9 °F (36.6 °C)   Pulse 72 (06/26/24 0542) Pulse  Min: 61  Max: 88   Respiratory 20 (06/26/24 0542) Resp  Min: 15  Max: 22   Non-Invasive  Blood Pressure (!) 169/77 (06/26/24 0542) BP  Min: 99/74  Max: 169/77   Pulse Oximetry 95 % (06/26/24 0542) SpO2  Min: 90 %  Max: 99 %     Vital Today Admitted   Weight 79.5 kg (175 lb 4.3 oz) (06/26/24 0509) Weight: 83.6 kg (184 lb 4.9 oz) (06/25/24 2035)    Height N/A Height: 5' 8\" (172.7 cm) (06/25/24 2035)   BMI N/A BMI (Calculated): 28.02 (06/25/24 2035)       Physical Exam  Constitutional:       Appearance: Normal appearance.   HENT:      Head: Normocephalic.      Comments: laceration     Nose: Nose normal.      Neck: Normal range of motion.   Eyes:      Conjunctiva/sclera: Conjunctivae normal.   Cardiovascular:      Rate and Rhythm: Normal rate and regular rhythm.      Pulses: Normal pulses.      Heart sounds: Normal heart sounds.   Pulmonary:      Effort: Pulmonary effort is normal.      Breath sounds: Normal breath sounds.   Abdominal:      General: Bowel sounds are normal.   Musculoskeletal:      Right lower leg: No edema.      Left lower leg: No edema.   Skin:     General: Skin is warm and dry.   Neurological:      General: No focal deficit present.      Mental Status: He is alert and oriented to person, place, and time.   Psychiatric:         Mood and Affect: Mood normal.          Imaging  CT CERVICAL SPINE WO CONTRAST    Result Date: 6/25/2024  EXAM: CT CERVICAL SPINE WO CONTRAST CLINICAL INDICATION: Pain. Injury. COMPARISON: November 5, 2022. FINDINGS: Craniocervical alignment preserved. No acute fracture or malalignment. Endplate and facet degenerative changes throughout the cervical spine. No prevertebral swelling. Atherosclerosis.     No evidence of acute cervical injury. Electronically Signed by: SANDRA TRINIDAD M.D. Signed on: 6/25/2024 9:10 PM Workstation ID: [id number]    CT HEAD WO CONTRAST    Result Date: 6/25/2024  EXAM: CT HEAD WO CONTRAST CLINICAL INDICATION: Pain after injury COMPARISON: None  TECHNIQUE:  Noncontrast CT Head was obtained.  FINDINGS: Mild chronic microvascular ischemic changes in the white matter No parenchymal hemorrhage, edema, mass effect, midline shift, basal cistern effacement. No hydrocephalus or extra-axial fluid collection. Paranasal sinuses, mastoids and orbits are unremarkable.     No evidence of acute  intracerebral process.  Electronically Signed by: SANDRA TRINIDAD M.D. Signed on: 6/25/2024 9:00 PM Workstation ID: [id number]      Labs     Recent Labs   Lab 06/25/24 2038   WBC 6.9   HCT 45.0   HGB 15.1      SODIUM 142   POTASSIUM 3.6   CHLORIDE 105   CO2 20*   CALCIUM 9.3   GLUCOSE 93   BUN 11   CREATININE 0.95   AST 68*   GPT 58   ALKPT 100   BILIRUBIN 0.3   ALBUMIN 3.6       ASSESSMENT/PLAN:  Acute conditions  Fall leading to laceration at head  CT of the head and CT cervical spine as above  Energetic  Monitor    Alcohol intoxication without complication  CIWA score  Fall precautions    Cystitis , possible UTI  IV antibiotic  Urine culture    Depressed mood  Monitor    Chronic conditions-continue medication  Anxiety  Glaucoma  Gastroesophageal reflux disease  Hypertension  Hyperlipidemia  Lumbar stenosis      DVT PPx: Lovenox and SCD    DISPO: Pending clinical improvement    Expected discharge date: today    Code Status:   Code Status: Prior    PCP: Hector Bender MD    Please note this note may have been completed with voice recording software. Unanticipated grammatical and interpretive errors may have been inadvertently transcribed.    Ivana Dumont MD

## 2024-07-11 ENCOUNTER — LAB ENCOUNTER (OUTPATIENT)
Dept: LAB | Age: 49
End: 2024-07-11
Attending: INTERNAL MEDICINE
Payer: COMMERCIAL

## 2024-07-11 DIAGNOSIS — E11.65 TYPE 2 DIABETES MELLITUS WITH HYPERGLYCEMIA, WITHOUT LONG-TERM CURRENT USE OF INSULIN (HCC): ICD-10-CM

## 2024-07-11 LAB
CREAT UR-SCNC: 43.7 MG/DL
MICROALBUMIN UR-MCNC: <0.3 MG/DL

## 2024-07-11 PROCEDURE — 82043 UR ALBUMIN QUANTITATIVE: CPT

## 2024-07-11 PROCEDURE — 82570 ASSAY OF URINE CREATININE: CPT

## 2024-07-22 ENCOUNTER — TELEPHONE (OUTPATIENT)
Dept: ENDOCRINOLOGY CLINIC | Facility: CLINIC | Age: 49
End: 2024-07-22

## 2024-07-22 DIAGNOSIS — E11.65 TYPE 2 DIABETES MELLITUS WITH HYPERGLYCEMIA, WITHOUT LONG-TERM CURRENT USE OF INSULIN (HCC): ICD-10-CM

## 2024-07-22 NOTE — TELEPHONE ENCOUNTER
Blood pressure 108/64, pulse 74, temperature 98.7  F (37.1  C), temperature source Oral, resp. rate 16, last menstrual period 04/27/2019, not currently breastfeeding.  Patient Vitals for the past 24 hrs:   BP Temp Temp src Pulse Resp   02/03/20 1930 108/64 98.7  F (37.1  C) Oral -- 16   02/03/20 1700 -- 97.7  F (36.5  C) Oral -- --   02/03/20 1500 -- 97.9  F (36.6  C) Oral -- --   02/03/20 1300 112/73 98  F (36.7  C) Oral 74 16     General appearance: comfortable  Sleeping now, feeling occas tightenings. No cramping. Had done some nipple stimulation without change in contr pattern or intensity  CONTACTIONS: mild and every 2-4 minutes. No toco  FETAL HEART TONES: Intermittent auscultation- 140. No decelerations heard.  ROM: clear fluid  PELVIC EXAM:deferred  # Pain Assessment:  Current Pain Score 2/3/2020   Patient currently in pain? denies   Pain descriptors -   Shavonne butcher pain level was assessed and she currently denies pain.      ASSESSMENT:  ==============  IUP @ 40w2d with SROM without labor and SROM x 12 hours   Fetal Heart Rate Tracing no decreases heard with intermittent auscultation  GBS- negative  Patient Active Problem List   Diagnosis     History of abnormal cervical Pap smear     Supervision of primigravida of advanced maternal age, [facility] CNM     Myopia     ADD (attention deficit disorder) without hyperactivity     Encounter for triage in pregnant patient     Labor and delivery, indication for care     PLAN:  ===========  Discussed recommendation of pitocin induction with 12 hours SROM without labor. Pt and  would like to wait another two hours for spontaneous labor then agreeable to reevaluate.   Continue with JA Maldonado CNM     Pharmacy requesting pa for       Dulaglutide (TRULICITY) 3 MG/0.5ML Subcutaneous Solution Pen-injector, Inject 3 mg into the skin once a week., Disp: 6 mL, Rfl: 3    KEY: [id number]

## 2024-07-22 NOTE — TELEPHONE ENCOUNTER
Medication PA Requested: (TRULICITY) 3 MG/0.5ML Subcutaneous Solution Pen                                                          CoverMyMeds Used:  Key:  Quantity: 6mL  Day Supply: 90  Sig:    Inject 3 mg into the skin once a week.   DX Code:    E11.65

## 2024-07-24 RX ORDER — DULAGLUTIDE 3 MG/.5ML
3 INJECTION, SOLUTION SUBCUTANEOUS WEEKLY
Qty: 6 ML | Refills: 3 | Status: SHIPPED | OUTPATIENT
Start: 2024-07-24 | End: 2024-10-22

## 2024-07-25 NOTE — TELEPHONE ENCOUNTER
Per electronic prior authorization, This request has been approved using information available on the patient's profile.    Trulicity approved from 6/24/24-7/24/25    Compositence message sent to patient

## 2024-09-05 ENCOUNTER — HOSPITAL ENCOUNTER (OUTPATIENT)
Dept: GENERAL RADIOLOGY | Facility: HOSPITAL | Age: 49
Discharge: HOME | End: 2024-09-05
Attending: ORTHOPAEDIC SURGERY
Payer: COMMERCIAL

## 2024-09-05 ENCOUNTER — OFFICE VISIT (OUTPATIENT)
Dept: ORTHOPEDICS CLINIC | Facility: CLINIC | Age: 49
End: 2024-09-05
Payer: COMMERCIAL

## 2024-09-05 VITALS — BODY MASS INDEX: 37.8 KG/M2 | WEIGHT: 235.19 LBS | HEIGHT: 66 IN

## 2024-09-05 DIAGNOSIS — M25.569 KNEE PAIN, UNSPECIFIED CHRONICITY, UNSPECIFIED LATERALITY: ICD-10-CM

## 2024-09-05 DIAGNOSIS — M17.11 PRIMARY OSTEOARTHRITIS OF RIGHT KNEE: Primary | ICD-10-CM

## 2024-09-05 PROCEDURE — 73564 X-RAY EXAM KNEE 4 OR MORE: CPT | Performed by: ORTHOPAEDIC SURGERY

## 2024-09-05 PROCEDURE — 99244 OFF/OP CNSLTJ NEW/EST MOD 40: CPT | Performed by: ORTHOPAEDIC SURGERY

## 2024-09-05 PROCEDURE — 3008F BODY MASS INDEX DOCD: CPT | Performed by: ORTHOPAEDIC SURGERY

## 2024-09-05 PROCEDURE — 20610 DRAIN/INJ JOINT/BURSA W/O US: CPT | Performed by: ORTHOPAEDIC SURGERY

## 2024-09-05 RX ORDER — TRIAMCINOLONE ACETONIDE 40 MG/ML
40 INJECTION, SUSPENSION INTRA-ARTICULAR; INTRAMUSCULAR ONCE
Status: COMPLETED | OUTPATIENT
Start: 2024-09-05 | End: 2024-09-05

## 2024-09-05 RX ADMIN — TRIAMCINOLONE ACETONIDE 40 MG: 40 INJECTION, SUSPENSION INTRA-ARTICULAR; INTRAMUSCULAR at 16:18:00

## 2024-09-05 NOTE — PROGRESS NOTES
Per verbal order from Dr. Gay draw up 3ml of 0.5% Marcaine & 2ml 1% lidocaine and 1ml of Kenalog 40 for cortisone injection to right knee. Cheryl DOYLE MA    Patient provided education handout for cortisone injection.

## 2024-09-05 NOTE — PROGRESS NOTES
NURSING INTAKE COMMENTS:   Chief Complaint   Patient presents with    Knee Pain     New pt- R knee- onset- 6 mos ago- denies injury but had R knee sx 5 yrs ago at [facility]- was given 5 series of gel injection in 2024 by Pain MD w/ no relief- rates pain 2-9/10 depends w/ activity- pt bring a disc copy of MRI that was taken on 2024 [facility]       HPI: This 49 year old female presents today with complaints of right knee pain.  She had an injury to the right knee requiring surgery about 5 years ago.  She had a meniscus tear which was treated with a partial meniscectomy.  Since that time she is developed progressive pain in the knee specially over the last year.  She notices some swelling in the knee.  She has no significant clicking but does feel a grinding sensation.  She does have some posterior hip and thigh pain as well as low back pain.  She has difficulty going down the stairs.  Recent gel injections into the knee made her knee feel worse.  She has been icing the knee and taking Advil and meloxicam intermittently.  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    Past Medical History:    Anesthesia complication    Anxiety    Arthritis    Diabetes (HCC)    Disorder of thyroid    Hypothyroidism    - + Hashimoto    Obesity    Pseudocholinesterase deficiency     Past Surgical History:   Procedure Laterality Date    Ablation  2021    cervical    Colonoscopy N/A 09/10/2022    Procedure: COLONOSCOPY;  Surgeon: Brooke Claros MD;  Location: [facility] ENDOSCOPY    Benedict biopsy stereo nodule 1 site left (cpt=19081)  2022    CLIP - TOP HAT      2014    Other surgical history Right     meniscus repair     Other surgical history      exp lap     Other surgical history      sinus surg x 2      Current Outpatient Medications   Medication Sig Dispense Refill    Dulaglutide (TRULICITY) 3 MG/0.5ML Subcutaneous Solution Pen-injector Inject 3 mg into the skin once a week. 6 mL 3     zolpidem 5 MG Oral Tab Take 1 tablet (5 mg total) by mouth nightly as needed for Sleep. When traveling      lisinopril 10 MG Oral Tab Take 1 tablet (10 mg total) by mouth daily. 90 tablet 2    busPIRone 10 MG Oral Tab Take 1 tablet (10 mg total) by mouth in the morning and 1 tablet (10 mg total) before bedtime. 180 tablet 3    traZODone 100 MG Oral Tab Take 1.5 tablets (150 mg total) by mouth nightly. 135 tablet 3    Continuous Glucose Sensor (DEXCOM G7 SENSOR) Does not apply Misc 1 each Every 10 days. Use as directed every 10 days 9 each 1    levothyroxine 75 MCG Oral Tab Take 1 tablet (75 mcg total) by mouth before breakfast. 90 tablet 2    progesterone 200 MG Oral Cap Take 1 capsule (200 mg total) by mouth nightly.      TESTOSTERONE BU Place inside cheek.      Emollient (DHEA EX) Apply 50 mg topically.      Lactobacillus (PROBIOTIC ACIDOPHILUS) Oral Tab Take by mouth.      B Complex Vitamins (VITAMIN B COMPLEX OR) Inject as directed.      Magnesium 200 MG Oral Tab Take by mouth.      Cholecalciferol (VITAMIN D3) 250 MCG (67106 UT) Oral Cap        Allergies   Allergen Reactions    Succinylcholine OTHER (SEE COMMENTS) and UNKNOWN     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     Family History   Problem Relation Age of Onset    Heart Disorder Father     Hypertension Father     Depression Mother     Obesity Mother     Diabetes Paternal Grandmother        Social History     Occupational History    Not on file   Tobacco Use    Smoking status: Never    Smokeless tobacco: Never   Vaping Use    Vaping status: Not on file   Substance and Sexual Activity    Alcohol use: Yes     Alcohol/week: 2.0 standard drinks of alcohol     Types: 2 Glasses of wine per week     Comment: occa    Drug use: Never     Comment: occ gummies     Sexual activity: Not on file        Review of Systems:  GENERAL: denies fevers, chills, night sweats, fatigue, unintentional weight loss/gain  SKIN: denies skin lesions,  open sores, rash  HEENT:denies recent vision change, new nasal congestion,hearing loss, tinnitus, sore throat, headaches  RESPIRATORY: denies new shortness of breath, cough, asthma, wheezing  CARDIOVASCULAR: denies chest pain, leg cramps with exertion, palpitations, leg swelling  GI: denies abdominal pain, nausea, vomiting, diarrhea, constipation, hematochezia, worsening heartburn or stomach ulcers  : denies dysuria, hematuria, incontinence, increased frequency, urgency, difficulty urinating  MUSCULOSKELETAL: denies musculoskeletal complaints other than in HPI  NEURO: denies numbness, tingling, weakness, balance issues, dizziness, memory loss  PSYCHIATRIC: denies Hx of depression, anxiety, other psychiatric disorders  HEMATOLOGIC: denies blood clots, anemia, blood clotting disorders, blood transfusion  ENDOCRINE: denies autoimmune disease, thyroid issues, or diabetes  ALLERGY: denies asthma, seasonal allergies    Physical Examination:    Ht 5' 6\" (1.676 m)   Wt 235 lb 3.2 oz (106.7 kg)   BMI 37.96 kg/m²   Constitutional: appears well hydrated, alert and responsive, no acute distress noted  Extremities: She walks with slight antalgia on the right.  Further exam the right knee reveals trace effusion.  There is a varus deformity.  She is tender at the anteromedial joint line and medial patellar facet.  Lachman sign and posterior drawer negative.  The knee is stable varus valgus stress at 30 degrees and full extension.  Rotation maneuvers produce diffuse discomfort.  No significant pain with passive range of motion of the hip.  Sciatic notch mildly tender to palpation  Neurological: Light touch and pinprick sensation intact throughout the lower extremities.  Ankle dorsiflexion plantarflexion EHL knee extension and hip flexion strength are 5 out of 5 bilaterally.  No clonus.    Imaging:   Right knee x-rays show advanced degenerative change medial compartment, moderate degenerative change patellofemoral joint, mild  degenerative change lateral compartment.  No soft tissue calcification or fracture.    MRI right knee shows extensive tricompartmental degenerative change.  There is some cystic fluid collection along the posterior femur distally.    Labs:  No results found for: \"WBC\", \"HGB\", \"PLT\"   Lab Results   Component Value Date     (H) 01/28/2021    BUN 14 01/28/2021    CREATSERUM 0.93 01/28/2021    GFRNAA 84 02/12/2024    GFRAA 85 01/28/2021        Assessment and Plan:  Diagnoses and all orders for this visit:    Primary osteoarthritis of right knee    Knee pain, unspecified chronicity, unspecified laterality  -     XR KNEE, COMPLETE (4 OR MORE VIEWS), RIGHT (CPT=73564); Future        Assessment: Right knee osteoarthritis, primary, severe    Plan: I discussed operative and nonoperative treatments.  She is failed all conservative treatments to date.  She is quite young for surgical reconstruction of her knee.  She may benefit from a unicompartmental arthroplasty.  Her MRI indicates significant lateral compartment and patellofemoral degeneration and bone marrow edema.  Would consider valgus stress x-rays at the follow-up appointment prior to making final decision.  She elected for an injection today.  The right knee was aspirated and injected with 40 mg of Kenalog using a superolateral parapatellar approach.  Aspirated 4 cc of clear yellow bloody fluid.  Advised icing, oral anti-inflammatories, activity modifications and home exercises.  Follow-up again in 6 weeks.    Follow Up: Return in about 6 weeks (around 10/17/2024).    LICHA PEREZ MD

## 2024-11-04 ENCOUNTER — HOSPITAL ENCOUNTER (OUTPATIENT)
Dept: MAMMOGRAPHY | Age: 49
Discharge: HOME | End: 2024-11-04
Attending: INTERNAL MEDICINE
Payer: COMMERCIAL

## 2024-11-04 DIAGNOSIS — Z12.31 SCREENING MAMMOGRAM, ENCOUNTER FOR: ICD-10-CM

## 2024-11-04 PROCEDURE — 77067 SCR MAMMO BI INCL CAD: CPT | Performed by: INTERNAL MEDICINE

## 2024-11-04 PROCEDURE — 77063 BREAST TOMOSYNTHESIS BI: CPT | Performed by: INTERNAL MEDICINE

## 2024-11-13 DIAGNOSIS — E06.3 HASHIMOTO'S THYROIDITIS: ICD-10-CM

## 2024-11-15 ENCOUNTER — OFFICE VISIT (OUTPATIENT)
Dept: ORTHOPEDICS CLINIC | Facility: CLINIC | Age: 49
End: 2024-11-15

## 2024-11-15 DIAGNOSIS — M17.11 PRIMARY OSTEOARTHRITIS OF RIGHT KNEE: Primary | ICD-10-CM

## 2024-11-16 ENCOUNTER — PATIENT MESSAGE (OUTPATIENT)
Dept: ORTHOPEDICS CLINIC | Facility: CLINIC | Age: 49
End: 2024-11-16

## 2024-11-17 PROBLEM — M17.11 PRIMARY OSTEOARTHRITIS OF RIGHT KNEE: Status: ACTIVE | Noted: 2024-11-17

## 2024-11-18 RX ORDER — LEVOTHYROXINE SODIUM 75 UG/1
75 TABLET ORAL
Qty: 90 TABLET | Refills: 3 | Status: SHIPPED | OUTPATIENT
Start: 2024-11-18

## 2024-11-18 NOTE — TELEPHONE ENCOUNTER
Please review.  Protocol failed / Has no protocol.    Outside labs for TSH, T3, T4 completed 2/12/24  TSH  Order: [id number]  Component  Ref Range & Units 2/12/24  4:50 PM   TSH  0.30 - 5.33 µIU/mL 0.46   Resulting Agency [facility]        Requested Prescriptions   Pending Prescriptions Disp Refills    LEVOTHYROXINE 75 MCG Oral Tab [Pharmacy Med Name: LEVOTHYROXINE 75 MCG TABLET] 90 tablet 3     Sig: TAKE 1 TABLET BY MOUTH BEFORE BREAKFAST.       Thyroid Medication Protocol Failed - 11/18/2024 10:01 AM        Failed - TSH in past 12 months        Passed - Last TSH value is normal     Lab Results   Component Value Date    TSH 1.780 01/27/2021                 Passed - In person appointment or virtual visit in the past 12 mos or appointment in next 3 mos     Recent Outpatient Visits              3 days ago Primary osteoarthritis of right knee    [facility] Shoaib Arteaga MD    Office Visit    2 months ago Primary osteoarthritis of right knee    [facility] Jaswant Gay MD    Office Visit    4 months ago Anxiety and depression    [facility] Melissa Mattson MD    Office Visit    6 months ago Type 2 diabetes mellitus with hyperglycemia, without long-term current use of insulin ([facility])    [facility] Sharla Stuart MD    Office Visit    7 months ago Anxiety    [facility] Melissa Mattson MD    Office Visit          Future Appointments         Provider Department Appt Notes    In 2 weeks Jaswant Gay MD [facility] Right knee f/u appt    In 1 month Melissa Mattson MD [facility] Annual    In 1 month Sharla Stuart MD [facility] Annual check in                        Future Appointments         Provider Department Appt Notes    In 2 weeks Jaswant Gay MD [facility] Right knee f/u appt    In 1 month Melissa Mattson MD [facility] Annual    In 1 month Sharla Stuart MD [facility] Annual check in          Recent Outpatient Visits              3 days ago Primary osteoarthritis of right knee    [facility] Shoaib Arteaga MD    Office Visit    2 months ago Primary osteoarthritis of right knee    [facility] Jaswant Gay MD    Office Visit    4 months ago Anxiety and depression    [facility] Melissa Mattson MD    Office Visit    6 months ago Type 2 diabetes mellitus with hyperglycemia, without long-term current use of insulin (HCC)    [facility] Sharla Stuart MD    Office Visit    7 months ago Anxiety    [facility] Melissa Mattson MD    Office Visit

## 2024-11-18 NOTE — H&P
Orthopaedic Surgery New Patient Visit  _____________________________________________________________________________________________________  _____________________________________________________________________________________________________    DATE OF VISIT: 11/15/2024     CHIEF COMPLAINT:   Chief Complaint   Patient presents with    Knee Pain     R knee - Pt was seen by . Was given cortisone injection 09/05, no results. Pt would like 2nd opinion. Constant knee ache.         HISTORY OF PRESENT ILLNESS: Mindi Bob is a 49 year old female who presents to the clinic for evaluation of discussion of her right knee.  She reports that she has chronic right knee pain.  She denies any mechanical symptoms, buckling, or instability.  She has pain diffusely throughout the knee with swelling.  This limits her function and daily activities.  This also affects her quality of life.  She has attempted activity modification, therapy, weight loss, anti-inflammatories, cryotherapy, and injections without any substantial long-term relief.  She has discussed the possibility of knee arthroplasty with Dr. Gay and would like to discuss this further.  Pain is variable but typically about 3-6 out of 10 on a regular basis and made worse with ambulation and activities.    SOCIAL HISTORY  Social History     Socioeconomic History    Marital status: Single     Spouse name: Not on file    Number of children: Not on file    Years of education: Not on file    Highest education level: Not on file   Occupational History    Not on file   Tobacco Use    Smoking status: Never    Smokeless tobacco: Never   Vaping Use    Vaping status: Not on file   Substance and Sexual Activity    Alcohol use: Yes     Alcohol/week: 2.0 standard drinks of alcohol     Types: 2 Glasses of wine per week     Comment: occa    Drug use: Never     Comment: occ gummies     Sexual activity: Not on file   Other Topics Concern    Caffeine Concern Not Asked     Exercise Not Asked    Seat Belt Yes    Special Diet Not Asked    Stress Concern Not Asked    Weight Concern Not Asked   Social History Narrative    Not on file     Social Drivers of Health     Financial Resource Strain: Not on file   Food Insecurity: Not on file   Transportation Needs: Not on file   Physical Activity: Not on file   Stress: Not on file   Social Connections: Not on file   Housing Stability: Not on file        PAST MEDICAL HISTORY  Past Medical History:    Anesthesia complication    Anxiety    Arthritis    Diabetes (HCC)    Disorder of thyroid    Hypothyroidism     + Hashimoto    Obesity    Pseudocholinesterase deficiency        PAST SURGICAL HISTORY  Past Surgical History:   Procedure Laterality Date    Ablation  2021    cervical    Colonoscopy N/A 09/10/2022    Procedure: COLONOSCOPY;  Surgeon: Brooke Claros MD;  [facility] ENDOSCOPY    Benedict biopsy stereo nodule 1 site left (cpt=19081)  2022    CLIP - TOP HAT      2014    Other surgical history Right     meniscus repair     Other surgical history      exp lap     Other surgical history      sinus surg x 2         MEDICATIONS  * Reviewed   Dulaglutide (TRULICITY) 3 MG/0.5ML Subcutaneous Solution Auto-injector Inject 3 mg into the skin once a week. 6 mL 1    zolpidem 5 MG Oral Tab Take 1 tablet (5 mg total) by mouth nightly as needed for Sleep. When traveling      lisinopril 10 MG Oral Tab Take 1 tablet (10 mg total) by mouth daily. 90 tablet 2    busPIRone 10 MG Oral Tab Take 1 tablet (10 mg total) by mouth in the morning and 1 tablet (10 mg total) before bedtime. 180 tablet 3    traZODone 100 MG Oral Tab Take 1.5 tablets (150 mg total) by mouth nightly. 135 tablet 3    Continuous Glucose Sensor (DEXCOM G7 SENSOR) Does not apply Misc 1 each Every 10 days. Use as directed every 10 days 9 each 1    levothyroxine 75 MCG Oral Tab Take 1 tablet (75 mcg total) by mouth before breakfast. 90 tablet 2    progesterone 200 MG Oral  Cap Take 1 capsule (200 mg total) by mouth nightly.      TESTOSTERONE BU Place inside cheek.      Emollient (DHEA EX) Apply 50 mg topically.      Lactobacillus (PROBIOTIC ACIDOPHILUS) Oral Tab Take by mouth.      B Complex Vitamins (VITAMIN B COMPLEX OR) Inject as directed.      Magnesium 200 MG Oral Tab Take by mouth.      Cholecalciferol (VITAMIN D3) 250 MCG (48739 UT) Oral Cap           ALLERGIES  Allergies[1]     FAMILY HISTORY  Family History   Problem Relation Age of Onset    Heart Disorder Father     Hypertension Father     Depression Mother     Obesity Mother     Diabetes Paternal Grandmother         REVIEW OF SYSTEMS  A 14 point review of systems was performed. Pertinent positives and negatives noted in the HPI.    PHYSICAL EXAM  There were no vitals taken for this visit.     Constitutional: The patient is well-developed, well-nourished, in no acute distress.  Neurological: Alert and oriented to person, place, and time.  Psychiatric: Mood and affect normal.  Head: Normocephalic and atraumatic.  Cardiovascular: regular rate by palpation  Pulmonary/Chest: Effort normal. No respiratory distress. Breathing non-labored  Abdominal: Abdomen exhibits no distension.   Right  KNEE  INSPECTION/PALPATION  No readily apparent visual abnormalities of the knee.   No ecchymosis or erythema  Mild effusion  No breaks in skin. Skin is euthermic.  Slight varus   alignment on standing, slightly antalgic gait  TTP to medial joint line, NTTP to lateral joint line  ROM  0-120 degrees  KNEE STRENGTH  Flexion: 5/5  Extension: 5/5  STABILITY  1A lachman, stable anterior drawer  Stable posterior drawer  Stable to varus and valgus stress at 0 and 30 degrees  SILT Patito/Saph/SPN/DPN/T  2+ DP/PT pulse     RESULTS    No results found for: \"WBC\", \"HGB\", \"PLT\"   Lab Results   Component Value Date     (H) 01/28/2021    BUN 14 01/28/2021    CREATSERUM 0.93 01/28/2021    GFRNAA 84 02/12/2024    GFRAA 85 01/28/2021        IMAGING  I  independently viewed and interpreted the imaging. Radiologist interpretation is available in the imaging report.  X-ray: Plain films of the right knee knee including AP, lateral, and sunrise weightbearing views were reviewed. These demonstrate no acute osseous abnormalities.  The patella is centrally positioned within the femoral trochlea.  There are moderate degenerative changes in the patellofemoral joint.  There are moderate degenerative changes in the medial knee compartment and mild degenerative changes in the lateral knee compartment of the tibiofemoral joint.  There are no  loose bodies evident.  There is no evidence of Segond fracture or other fractures.     ASSESSMENT/PLAN: Mindi Bob is a 49 year old female who presents to the Orthopaedic surgery clinic today for right knee osteoarthritis.  Her pattern of osteoarthritis is mostly medial though she does have patellofemoral arthritis and some mild degenerative changes of the lateral compartment.  She could theoretically be indicated for a unicompartmental arthroplasty though, given the presence of arthritis in the other joint spaces, it may be a more reasonable long-term solution to undergo a total knee arthroplasty.  Problematically, she is very young and therefore this would ultimately very likely necessitate a revision surgery down the line, likely in her 60s or early 70s.  We discussed the details of the surgery to include the risk, benefits, alternatives, procedural details, hospital stay, and pain regimen.  After discussion, she thinks that she would like to proceed with operative intervention with Dr. Gay.  I advised her that she could reach out to him scheduling in this upcoming week.     Discussed the history, physical exam, treatment to date, and reviewed relevant imaging an studies with the patient.  WEIGHT BEARING STATUS: Weightbearing as tolerated  RANGE-OF-MOTION LIMITATIONS: as tolerated  NEW PRESCRIPTIONS:  We discussed medications  for this condition including patient current regimen. Based on this discussion we have added/re-ordered no additional medications  IMAGING ORDERED: none  CONSULTS PLACED: We discussed the role of therapy for this condition including previous/ongoing therapy and specialist services. Based on this discussion we have opted not to place a consultation to other specialists/therapy  PROSTHESES/ORTHOTICS: based on the condition and patient's function/needs, we have ordered a knee sleeve  PROCEDURES: none    FOLLOW-UP: as needed.  Otherwise, she will follow-up with Dr. Gay    RADIOGRAPHS AT NEXT VISIT: none    I have personally seen Mindi Bob and discussed in detail their plan of care. Prior to departure, they indicated agreement with and understanding of their plan of care and their follow-up as documented herein this note. Please note that this note was written in combination with voice recognition/dictation software and there is a possibility of transcription errors which were not identified at the time of note submission. If clarification is necessary, please contact the author or clinic staff.    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

## 2024-11-26 NOTE — TELEPHONE ENCOUNTER
Called patient and advised no sooner appointments than 12/5. On wait list. Patient inquired if anything needed prior to Follow up appointment and advised nothing needed prior to. She had no further questions.

## 2025-01-08 ENCOUNTER — OFFICE VISIT (OUTPATIENT)
Dept: INTERNAL MEDICINE CLINIC | Facility: CLINIC | Age: 50
End: 2025-01-08
Payer: COMMERCIAL

## 2025-01-08 VITALS
SYSTOLIC BLOOD PRESSURE: 141 MMHG | HEART RATE: 62 BPM | HEIGHT: 66 IN | DIASTOLIC BLOOD PRESSURE: 81 MMHG | WEIGHT: 228.81 LBS | BODY MASS INDEX: 36.77 KG/M2

## 2025-01-08 DIAGNOSIS — Z23 NEED FOR VACCINATION: ICD-10-CM

## 2025-01-08 DIAGNOSIS — E11.65 TYPE 2 DIABETES MELLITUS WITH HYPERGLYCEMIA, WITHOUT LONG-TERM CURRENT USE OF INSULIN (HCC): ICD-10-CM

## 2025-01-08 DIAGNOSIS — Z00.00 PHYSICAL EXAM, ANNUAL: Primary | ICD-10-CM

## 2025-01-08 DIAGNOSIS — Z72.820 POOR SLEEP: ICD-10-CM

## 2025-01-08 RX ORDER — AMOXICILLIN 500 MG/1
500 CAPSULE ORAL EVERY 12 HOURS
COMMUNITY
Start: 2024-11-17 | End: 2025-01-08 | Stop reason: ALTCHOICE

## 2025-01-08 RX ORDER — TRAZODONE HYDROCHLORIDE 100 MG/1
100 TABLET ORAL NIGHTLY
COMMUNITY
Start: 2025-01-08

## 2025-01-08 RX ORDER — MINOXIDIL 2.5 MG/1
TABLET ORAL
COMMUNITY
Start: 2024-11-14

## 2025-01-08 NOTE — PROGRESS NOTES
Mindi Bob is a 50 year old female.  Chief Complaint   Patient presents with    Physical     Declines vaccines today        HPI:   Patient comes for her annual physical  C/C annual physical   C/o tomorrow she will see a dr   Has a surg plus insurance plan for non emergent surgery   She needs a right knee replacement -has seen ortho but [facility]   Can not walk a block without pain , can't walk her dog           HISTORY    reviewed labs done by Dr. Joel noted that her white count was elevated but she was really sick at that time and was on steroids  Off metformin   Alternates Trulicity and Mounjaro depending on what is available in the pharmacy-currently on Trulicity  Getting gel inj to her knees - pain relief  institute -dr jono metcalf                 HISTORY  Trulicity was thought to make her nauseated but it was a viral inf   Having a difficulty  time --took even half of the ambien and it isnt helping   Had labs drawn through dr joel --testosterone a little high but she she is on testosterone      HISTORY  DJD lumbar spine and cervical spine takes meloxicam and cyclobenzaprine but now has stopped         HISTORY  New pt-- used to see anastasia Ward in [facility] -- moved to [place] 5 yrs ago   C/c establish care   C/o needs to establish care with a primary care physician and get checked up, review of systems detailed below with her concerns  Would like to come off of some medications especially the ones that were given for anxiety     PMH  dm2 diag in 2021 -a1c 10.7 in 2021   Hypothyroidism -hashimotots 2019- sees endo at [facility] - dr guillen    Herniated disc in neck - on meloxicam and flexeril--dr ballesteros   Anxiety - on lexapro but was started in 2008 which was a very stressful time  Vit d def -sees dr joel in [facility] - integrative medicine            Lives with two 8 yo twins - girls   Works [employer]           Current Outpatient Medications   Medication Sig  Dispense Refill    Ferrous Bisglycinate Chelate 28 MG Oral Cap       minoxidil 2.5 MG Oral Tab       traZODone 100 MG Oral Tab Take 1 tablet (100 mg total) by mouth nightly.      levothyroxine 75 MCG Oral Tab Take 1 tablet (75 mcg total) by mouth before breakfast. 90 tablet 3    Dulaglutide (TRULICITY) 3 MG/0.5ML Subcutaneous Solution Auto-injector Inject 3 mg into the skin once a week. 6 mL 1    zolpidem 5 MG Oral Tab Take 1 tablet (5 mg total) by mouth nightly as needed for Sleep. When traveling      lisinopril 10 MG Oral Tab Take 1 tablet (10 mg total) by mouth daily. 90 tablet 2    busPIRone 10 MG Oral Tab Take 1 tablet (10 mg total) by mouth in the morning and 1 tablet (10 mg total) before bedtime. 180 tablet 3    Continuous Glucose Sensor (DEXCOM G7 SENSOR) Does not apply Misc 1 each Every 10 days. Use as directed every 10 days 9 each 1    progesterone 200 MG Oral Cap Take 1 capsule (200 mg total) by mouth nightly.      TESTOSTERONE BU Place inside cheek.      Emollient (DHEA EX) Apply 50 mg topically.      Lactobacillus (PROBIOTIC ACIDOPHILUS) Oral Tab Take by mouth.      B Complex Vitamins (VITAMIN B COMPLEX OR) Inject as directed.      Magnesium 200 MG Oral Tab Take by mouth.      Cholecalciferol (VITAMIN D3) 250 MCG (94696 UT) Oral Cap         Past Medical History:    Anesthesia complication    Anxiety    Arthritis    Diabetes (HCC)    Disorder of thyroid    Hypothyroidism    - + Hashimoto    Obesity    Pseudocholinesterase deficiency      Past Surgical History:   Procedure Laterality Date    Ablation  2021    cervical    Colonoscopy N/A 09/10/2022    Procedure: COLONOSCOPY;  Surgeon: Brooke Claros MD;  Location: [facility] ENDOSCOPY    Benedict biopsy stereo nodule 1 site left (cpt=19081)  2022    CLIP - TOP HAT      2014    Other surgical history Right     meniscus repair     Other surgical history      exp lap     Other surgical history      sinus surg x 2       Social History:  Social  History     Socioeconomic History    Marital status: Single   Tobacco Use    Smoking status: Never     Passive exposure: Never    Smokeless tobacco: Never   Substance and Sexual Activity    Alcohol use: Yes     Alcohol/week: 2.0 standard drinks of alcohol     Types: 2 Glasses of wine per week     Comment: occa    Drug use: Never     Comment: occ gummies    Other Topics Concern    Seat Belt Yes        REVIEW OF SYSTEMS:   GENERAL HEALTH: No fevers, chills, sweats, fatigue  VISION: No recent vision problems, blurry vision or double vision  HEENT: No decreased hearing ear pain nasal congestion or sore throat  SKIN: denies any unusual skin lesions or rashes  RESPIRATORY: denies shortness of breath, cough, wheezing  CARDIOVASCULAR: denies chest pain on exertion, palpitations, swelling in feet  GI: denies abdominal pain and denies heartburn, nausea or vomiting  : No Pain on urination, change in the color of urine, discharge, urinating frequently  MUS: No back pain, joint pain, muscle pain  NEURO: denies headaches , anxiety, depression    EXAM:   /81   Pulse 62   Ht 5' 6\" (1.676 m)   Wt 228 lb 12.8 oz (103.8 kg)   BMI 36.93 kg/m²   GENERAL: well developed, well nourished,in no apparent distress  SKIN: no rashes,no suspicious lesions  HEENT: atraumatic, normocephalic,ears and throat are clear, no frontal or maxillary sinus tenderness, pupils equal reactive to light bilaterally, extraocular muscles intact  NECK: supple,no adenopathy, nontender   LUNGS: clear to auscultation, no wheeze  CARDIO: RRR + systolic murmur  GI: good BS's,no masses or tenderness  EXTREMITIES: no cyanosis, or edema  Bilateral barefoot skin diabetic exam is normal, visualized feet and the appearance is normal.  Bilateral monofilament/sensation of both feet is normal.  Pulsation pedal pulse exam of both lower legs/feet is normal as well.       ASSESSMENT AND PLAN:   Diagnoses and all orders for this visit:    Physical exam, annual  -      Comp Metabolic Panel (14); Future  -     Lipid Panel; Future  -     Microalb/Creat Ratio, Random Urine; Future  -     CBC, Platelet; No Differential; Future    Advised patient to watch what she eats and  exercise, seatbelt use no texting driving, sunscreen use advised    Poor sleep  Stable on meds    Type 2 diabetes mellitus with hyperglycemia, without long-term current use of insulin (HCC)  -     Comp Metabolic Panel (14); Future  -     Lipid Panel; Future  -     Microalb/Creat Ratio, Random Urine; Future  -     CBC, Platelet; No Differential; Future      Hba1c 5.6 on 4/2022 and 6.4 on 3/2023 and 6.1 on 6/2023 and 6 on 10/30/2023 and 6.6 on 2/2024 and 5.7 on 4/2024 AND 6.1 ON 12/2024  seeing endo  trulicty   U. Micro 7/2024  Eye exam  - [facility] -     Consider Asa , on  acei, consider statin   Foot 9/5/2023  Diet -- advised to follow a low carb, low sugar diet , try to be consistent                Exercise 30 min a day                  Preventive medicine  Pap smear she goes to [facility]-5/19/2022 seen in care everywhere  Mammogram - 11/2024   cscope - dr perdue 9/2022 and rpt in 10 yrs   Labs 12/2024  reviewed       The patient indicates understanding of these issues and agrees to the plan.  No follow-ups on file.

## 2025-01-11 ENCOUNTER — OFFICE VISIT (OUTPATIENT)
Dept: INTERNAL MEDICINE CLINIC | Facility: CLINIC | Age: 50
End: 2025-01-11

## 2025-01-11 ENCOUNTER — LAB ENCOUNTER (OUTPATIENT)
Dept: LAB | Age: 50
End: 2025-01-11
Attending: INTERNAL MEDICINE
Payer: COMMERCIAL

## 2025-01-11 ENCOUNTER — EKG ENCOUNTER (OUTPATIENT)
Dept: LAB | Age: 50
End: 2025-01-11
Attending: INTERNAL MEDICINE
Payer: COMMERCIAL

## 2025-01-11 VITALS
SYSTOLIC BLOOD PRESSURE: 123 MMHG | HEART RATE: 74 BPM | WEIGHT: 227 LBS | BODY MASS INDEX: 36.48 KG/M2 | DIASTOLIC BLOOD PRESSURE: 81 MMHG | HEIGHT: 66 IN

## 2025-01-11 DIAGNOSIS — M17.11 PRIMARY OSTEOARTHRITIS OF RIGHT KNEE: ICD-10-CM

## 2025-01-11 DIAGNOSIS — Z01.818 PREOP EXAMINATION: Primary | ICD-10-CM

## 2025-01-11 DIAGNOSIS — E11.65 TYPE 2 DIABETES MELLITUS WITH HYPERGLYCEMIA, WITHOUT LONG-TERM CURRENT USE OF INSULIN (HCC): ICD-10-CM

## 2025-01-11 DIAGNOSIS — M17.11 OSTEOARTHRITIS OF RIGHT KNEE: Primary | ICD-10-CM

## 2025-01-11 DIAGNOSIS — E03.9 HYPOTHYROIDISM, UNSPECIFIED TYPE: ICD-10-CM

## 2025-01-11 DIAGNOSIS — Z01.818 PREOP EXAMINATION: ICD-10-CM

## 2025-01-11 DIAGNOSIS — Z00.00 PHYSICAL EXAM, ANNUAL: ICD-10-CM

## 2025-01-11 DIAGNOSIS — E11.65 INADEQUATELY CONTROLLED DIABETES MELLITUS (HCC): ICD-10-CM

## 2025-01-11 DIAGNOSIS — E78.5 DYSLIPIDEMIA: ICD-10-CM

## 2025-01-11 LAB
ALBUMIN SERPL-MCNC: 4.4 G/DL (ref 3.2–4.8)
ALBUMIN/GLOB SERPL: 1.5 {RATIO} (ref 1–2)
ALP LIVER SERPL-CCNC: 51 U/L
ALT SERPL-CCNC: 29 U/L
ANION GAP SERPL CALC-SCNC: 6 MMOL/L (ref 0–18)
AST SERPL-CCNC: 23 U/L (ref ?–34)
ATRIAL RATE: 61 BPM
BILIRUB SERPL-MCNC: 0.5 MG/DL (ref 0.3–1.2)
BUN BLD-MCNC: 12 MG/DL (ref 9–23)
BUN/CREAT SERPL: 11.9 (ref 10–20)
CALCIUM BLD-MCNC: 9.5 MG/DL (ref 8.7–10.4)
CHLORIDE SERPL-SCNC: 105 MMOL/L (ref 98–112)
CHOLEST SERPL-MCNC: 196 MG/DL (ref ?–200)
CO2 SERPL-SCNC: 28 MMOL/L (ref 21–32)
CREAT BLD-MCNC: 1.01 MG/DL
CREAT UR-SCNC: 81.2 MG/DL
DEPRECATED RDW RBC AUTO: 40.3 FL (ref 35.1–46.3)
EGFRCR SERPLBLD CKD-EPI 2021: 68 ML/MIN/1.73M2 (ref 60–?)
ERYTHROCYTE [DISTWIDTH] IN BLOOD BY AUTOMATED COUNT: 13 % (ref 11–15)
EST. AVERAGE GLUCOSE BLD GHB EST-MCNC: 120 MG/DL (ref 68–126)
FASTING PATIENT LIPID ANSWER: YES
FASTING STATUS PATIENT QL REPORTED: YES
GLOBULIN PLAS-MCNC: 2.9 G/DL (ref 2–3.5)
GLUCOSE BLD-MCNC: 131 MG/DL (ref 70–99)
HBA1C MFR BLD: 5.8 % (ref ?–5.7)
HCT VFR BLD AUTO: 41 %
HDLC SERPL-MCNC: 40 MG/DL (ref 40–59)
HGB BLD-MCNC: 14.1 G/DL
LDLC SERPL CALC-MCNC: 134 MG/DL (ref ?–100)
MCH RBC QN AUTO: 29.5 PG (ref 26–34)
MCHC RBC AUTO-ENTMCNC: 34.4 G/DL (ref 31–37)
MCV RBC AUTO: 85.8 FL
MICROALBUMIN UR-MCNC: <0.3 MG/DL
NONHDLC SERPL-MCNC: 156 MG/DL (ref ?–130)
OSMOLALITY SERPL CALC.SUM OF ELEC: 290 MOSM/KG (ref 275–295)
P AXIS: 30 DEGREES
P-R INTERVAL: 156 MS
PLATELET # BLD AUTO: 317 10(3)UL (ref 150–450)
POTASSIUM SERPL-SCNC: 4.2 MMOL/L (ref 3.5–5.1)
PROT SERPL-MCNC: 7.3 G/DL (ref 5.7–8.2)
Q-T INTERVAL: 378 MS
QRS DURATION: 76 MS
QTC CALCULATION (BEZET): 380 MS
R AXIS: 55 DEGREES
RBC # BLD AUTO: 4.78 X10(6)UL
SODIUM SERPL-SCNC: 139 MMOL/L (ref 136–145)
T AXIS: 41 DEGREES
T4 FREE SERPL-MCNC: 1.5 NG/DL (ref 0.8–1.7)
TRIGL SERPL-MCNC: 120 MG/DL (ref 30–149)
TSI SER-ACNC: 1.42 UIU/ML (ref 0.55–4.78)
VENTRICULAR RATE: 61 BPM
VLDLC SERPL CALC-MCNC: 22 MG/DL (ref 0–30)
WBC # BLD AUTO: 8.9 X10(3) UL (ref 4–11)

## 2025-01-11 PROCEDURE — 82570 ASSAY OF URINE CREATININE: CPT

## 2025-01-11 PROCEDURE — 85027 COMPLETE CBC AUTOMATED: CPT

## 2025-01-11 PROCEDURE — 84439 ASSAY OF FREE THYROXINE: CPT

## 2025-01-11 PROCEDURE — 36415 COLL VENOUS BLD VENIPUNCTURE: CPT

## 2025-01-11 PROCEDURE — 87641 MR-STAPH DNA AMP PROBE: CPT

## 2025-01-11 PROCEDURE — 83036 HEMOGLOBIN GLYCOSYLATED A1C: CPT

## 2025-01-11 PROCEDURE — 93005 ELECTROCARDIOGRAM TRACING: CPT

## 2025-01-11 PROCEDURE — 84443 ASSAY THYROID STIM HORMONE: CPT

## 2025-01-11 PROCEDURE — 80053 COMPREHEN METABOLIC PANEL: CPT

## 2025-01-11 PROCEDURE — 82043 UR ALBUMIN QUANTITATIVE: CPT

## 2025-01-11 PROCEDURE — 93010 ELECTROCARDIOGRAM REPORT: CPT | Performed by: INTERNAL MEDICINE

## 2025-01-11 PROCEDURE — 80061 LIPID PANEL: CPT

## 2025-01-11 NOTE — PROGRESS NOTES
Mindi Bob is a 50 year old female.  Chief Complaint   Patient presents with    Pre-Op Exam     Right Total Knee Replacement for 2/5/25. With Butch PARIS       HPI:   Pre op  C/c pre op right total knee replacement and February 5, 2025 Dr. Metzger.Butch -[phone] and fax [phone]  Patient states that she can walk up 2 flights of stairs without feeling short of breath      HISTORY  She needs a right knee replacement -has seen ortho but now she has to see an [facility] in [facility]   Can not walk a block without pain , can't walk her dog               HISTORY    reviewed labs done by Dr. Joel noted that her white count was elevated but she was really sick at that time and was on steroids  Off metformin   Alternates Trulicity and Mounjaro depending on what is available in the pharmacy-currently on Trulicity  Getting gel inj to her knees - pain relief  institute -dr jono metcalf                 HISTORY  Trulicity was thought to make her nauseated but it was a viral inf   Having a difficulty  time --took even half of the ambien and it isnt helping   Had labs drawn through dr joel --testosterone a little high but she she is on testosterone      HISTORY  DJD lumbar spine and cervical spine takes meloxicam and cyclobenzaprine but now has stopped         HISTORY  New pt-- used to see radha forbes Ward in [facility] -- moved to [place] 5 yrs ago   C/c establish care   C/o needs to establish care with a primary care physician and get checked up, review of systems detailed below with her concerns  Would like to come off of some medications especially the ones that were given for anxiety     PMH  dm2 diag in 2021 -a1c 10.7 in 2021   Hypothyroidism -hashimotots 2019- sees irina at [facility] - dr guillen    Herniated disc in neck - on meloxicam and flexeril--dr ballesteros   Anxiety - on lexapro but was started in 2008 which was a very stressful time  Vit d def -sees dr joel in [facility] - integrative medicine            Lives  with two 6 yo twins - girls   [employer]    ------------------------------------------------------------------------------       Current Outpatient Medications   Medication Sig Dispense Refill    Ferrous Bisglycinate Chelate 28 MG Oral Cap       minoxidil 2.5 MG Oral Tab       traZODone 100 MG Oral Tab Take 1 tablet (100 mg total) by mouth nightly.      levothyroxine 75 MCG Oral Tab Take 1 tablet (75 mcg total) by mouth before breakfast. 90 tablet 3    Dulaglutide (TRULICITY) 3 MG/0.5ML Subcutaneous Solution Auto-injector Inject 3 mg into the skin once a week. 6 mL 1    zolpidem 5 MG Oral Tab Take 1 tablet (5 mg total) by mouth nightly as needed for Sleep. When traveling      lisinopril 10 MG Oral Tab Take 1 tablet (10 mg total) by mouth daily. 90 tablet 2    busPIRone 10 MG Oral Tab Take 1 tablet (10 mg total) by mouth in the morning and 1 tablet (10 mg total) before bedtime. 180 tablet 3    progesterone 200 MG Oral Cap Take 1 capsule (200 mg total) by mouth nightly.      TESTOSTERONE BU Place inside cheek.      Emollient (DHEA EX) Apply 50 mg topically.      Lactobacillus (PROBIOTIC ACIDOPHILUS) Oral Tab Take by mouth.      B Complex Vitamins (VITAMIN B COMPLEX OR) Inject as directed.      Magnesium 200 MG Oral Tab Take by mouth.      Cholecalciferol (VITAMIN D3) 250 MCG (95470 UT) Oral Cap       Continuous Glucose Sensor (DEXCOM G7 SENSOR) Does not apply Misc 1 each Every 10 days. Use as directed every 10 days (Patient not taking: Reported on 1/11/2025) 9 each 1      Past Medical History:    Anesthesia complication    Anxiety    Arthritis    Diabetes (HCC)    Disorder of thyroid    Hypothyroidism    1-2021 + Hashimoto    Obesity    Pseudocholinesterase deficiency      Past Surgical History:   Procedure Laterality Date    Ablation  02/12/2021    cervical    Colonoscopy N/A 09/10/2022    Procedure: COLONOSCOPY;  Surgeon: Brooke Claros MD;  Location: [facility] ENDOSCOPY    Benedict biopsy stereo nodule 1 site left  (nmf=[id number])  2022    CLIP - TOP HAT      2014    Other surgical history Right     meniscus repair     Other surgical history      exp lap     Other surgical history      sinus surg x 2       Social History:  Social History     Socioeconomic History    Marital status: Single   Tobacco Use    Smoking status: Never     Passive exposure: Never    Smokeless tobacco: Never   Substance and Sexual Activity    Alcohol use: Yes     Alcohol/week: 2.0 standard drinks of alcohol     Types: 2 Glasses of wine per week     Comment: occa    Drug use: Never     Comment: occ gummies    Other Topics Concern    Seat Belt Yes        REVIEW OF SYSTEMS:   GENERAL HEALTH: No fevers, chills, sweats, fatigue  VISION: No recent vision problems, blurry vision or double vision  HEENT: No decreased hearing ear pain nasal congestion or sore throat  SKIN: denies any unusual skin lesions or rashes  RESPIRATORY: denies shortness of breath, cough, wheezing  CARDIOVASCULAR: denies chest pain on exertion, palpitations, swelling in feet  GI: denies abdominal pain and denies heartburn, nausea or vomiting  : No Pain on urination, change in the color of urine, discharge, urinating frequently  MUS: No back pain, joint pain, muscle pain--just the knee   NEURO: denies headaches , anxiety, depression    EXAM:   /81   Pulse 74   Ht 5' 6\" (1.676 m)   Wt 227 lb (103 kg)   BMI 36.64 kg/m²   GENERAL: well developed, well nourished,in no apparent distress  SKIN: no rashes,no suspicious lesions  HEENT: atraumatic, normocephalic,throat are clear  NECK: supple,no adenopathy,nontender , good range of motion of the neck  LUNGS: clear to auscultation, no wheeze  CARDIO: RRR without murmur  GI: good BS's,no masses or tenderness  EXTREMITIES: no cyanosis, or edema    ASSESSMENT AND PLAN:   Diagnoses and all orders for this visit:    Preop examination  -     MRSA Screen by PCR; Future  -     EKG 12 Lead; Future  -     Hemoglobin A1C; Future  And    Primary osteoarthritis of right knee  -     MRSA Screen by PCR; Future  -     EKG 12 Lead; Future  -     Hemoglobin A1C; Future  Pt with good exercise tolerance   We will check labs and EKG today as well as MRSA screening      ADDENDUM 1/13/2025  Reviewed labs and EKG  Patient with good exercise tolerance  She is cleared for surgery  She is at low risk for any perioperative cardiac events  She will need routine perioperative care including early ambulation DVT prophylaxis incentive spirometry etc.    Type 2 diabetes mellitus with hyperglycemia, without long-term current use of insulin ([facility])  -     MRSA Screen by PCR; Future  -     EKG 12 Lead; Future  -     Hemoglobin A1C; Future    Hba1c 5.6 on 4/2022 and 6.4 on 3/2023 and 6.1 on 6/2023 and 6 on 10/30/2023 and 6.6 on 2/2024 and 5.7 on 4/2024 AND 6.1 ON 12/2024  seeing endo  trulicty   U. Micro 7/2024  Eye exam  - [facility] -     Consider Asa , on  acei, consider statin   Foot 9/5/2023  Diet -- advised to follow a low carb, low sugar diet , try to be consistent                Exercise 30 min a day                  Preventive medicine  Pap smear she goes to [facility]-5/19/2022 seen in care everywhere  Mammogram - 11/2024   cscope - dr perdue 9/2022 and rpt in 10 yrs   Labs 12/2024  reviewed                The patient indicates understanding of these issues and agrees to the plan.  No follow-ups on file.

## 2025-01-12 LAB — MRSA DNA SPEC QL NAA+PROBE: NEGATIVE

## 2025-01-13 ENCOUNTER — TELEPHONE (OUTPATIENT)
Dept: INTERNAL MEDICINE CLINIC | Facility: CLINIC | Age: 50
End: 2025-01-13

## 2025-01-13 NOTE — TELEPHONE ENCOUNTER
Medical clearance notes faxed along with labs and EKG tracing to [phone] and [phone] successfully

## 2025-01-15 ENCOUNTER — OFFICE VISIT (OUTPATIENT)
Dept: ENDOCRINOLOGY CLINIC | Facility: CLINIC | Age: 50
End: 2025-01-15

## 2025-01-15 VITALS
SYSTOLIC BLOOD PRESSURE: 124 MMHG | HEIGHT: 66 IN | DIASTOLIC BLOOD PRESSURE: 70 MMHG | BODY MASS INDEX: 36.45 KG/M2 | WEIGHT: 226.81 LBS | HEART RATE: 66 BPM

## 2025-01-15 DIAGNOSIS — E11.65 TYPE 2 DIABETES MELLITUS WITH HYPERGLYCEMIA, WITHOUT LONG-TERM CURRENT USE OF INSULIN (HCC): Primary | ICD-10-CM

## 2025-01-15 DIAGNOSIS — E78.5 DYSLIPIDEMIA: ICD-10-CM

## 2025-01-15 DIAGNOSIS — E03.9 ACQUIRED HYPOTHYROIDISM: ICD-10-CM

## 2025-01-15 LAB
GLUCOSE BLOOD: 145
TEST STRIP EXPIRATION DATE: NORMAL DATE
TEST STRIP LOT #: NORMAL NUMERIC

## 2025-01-15 PROCEDURE — 82947 ASSAY GLUCOSE BLOOD QUANT: CPT | Performed by: INTERNAL MEDICINE

## 2025-01-15 PROCEDURE — 3008F BODY MASS INDEX DOCD: CPT | Performed by: INTERNAL MEDICINE

## 2025-01-15 PROCEDURE — 3078F DIAST BP <80 MM HG: CPT | Performed by: INTERNAL MEDICINE

## 2025-01-15 PROCEDURE — 99214 OFFICE O/P EST MOD 30 MIN: CPT | Performed by: INTERNAL MEDICINE

## 2025-01-15 PROCEDURE — 3074F SYST BP LT 130 MM HG: CPT | Performed by: INTERNAL MEDICINE

## 2025-01-15 PROCEDURE — 3044F HG A1C LEVEL LT 7.0%: CPT | Performed by: INTERNAL MEDICINE

## 2025-01-15 PROCEDURE — 3061F NEG MICROALBUMINURIA REV: CPT | Performed by: INTERNAL MEDICINE

## 2025-01-15 RX ORDER — TIRZEPATIDE 10 MG/.5ML
10 INJECTION, SOLUTION SUBCUTANEOUS WEEKLY
Qty: 2 ML | Refills: 2 | Status: SHIPPED | OUTPATIENT
Start: 2025-01-15 | End: 2025-02-14

## 2025-01-15 RX ORDER — ATORVASTATIN CALCIUM 10 MG/1
10 TABLET, FILM COATED ORAL NIGHTLY
Qty: 90 TABLET | Refills: 0 | Status: SHIPPED | OUTPATIENT
Start: 2025-01-15 | End: 2025-04-15

## 2025-01-15 NOTE — PROGRESS NOTES
Name: Mindi Bob  Date: 1/15/25    Referring Physician: No ref. provider found    HISTORY OF PRESENT ILLNESS   Mindi Bob is a 50 year old female who presents for evaluation and management of type 2 diabetes. She was diagnosed with diabetes about 2 years ago. I had started patient on Mounjaro and she had been doing well on the 7.5mg dose. I had increased it to the 10mg dose and she had nausea. We then switched her to Trulicity and placed her on metformin ER 500mg at night. She is now stating that she is not able to lose any weight.     Blood Glucose Today: 148  HbA1C or glycohemoglobin is 5.8 on 1/11/24 (and it was 5.7 on 4/24/24 and it was 6.4 on 1/17/24 and it was 6.0 on 10/30/23)  Type 1 or Type 2?: Type 2  Medications for DM: Trulicity 3.0mg qweekly (1 year); Metformin ER 500mg PO qhs  Checking a few times a week (150 is the highest in the middle of the day an hour after she ate)   Episodes of hypoglycemia: none    Dietary compliance: good    Exercise: walks the dog everyday for 30 minutes    Polyuria/polydipsia: none    Blurred vision: none    Flu Vaccine This Season: yes    Covid Vaccine: yes    REVIEW OF SYSTEMS  CV: Cardiovascular disease present: none         Hypertension present: at goal, on meds         Hyperlipidemia present: not at goal, not on meds (1/11/25)         Peripheral Vascular Disease present: none    : Nephropathy present: MAC: none (1/11/25); Creatinine: 1.01      Neuro: Neuropathy present: none    Eyes: Diabetic retinopathy present: none            Most recent visit to eye doctor in last 12 months: yes, recently    Skin: Infection or ulceration: none    Osteoporosis/ Osteopenia: none Vitamin D: 75.7 (2/12/24)    Thyroid disease: yes; TSH: 1.415 (1/11/25 she is on 75mcg of levothyroxine)    Medications:     Current Outpatient Medications:     Ferrous Bisglycinate Chelate 28 MG Oral Cap, , Disp: , Rfl:     minoxidil 2.5 MG Oral Tab, , Disp: , Rfl:     traZODone 100 MG Oral Tab, Take  1 tablet (100 mg total) by mouth nightly., Disp: , Rfl:     levothyroxine 75 MCG Oral Tab, Take 1 tablet (75 mcg total) by mouth before breakfast., Disp: 90 tablet, Rfl: 3    Dulaglutide (TRULICITY) 3 MG/0.5ML Subcutaneous Solution Auto-injector, Inject 3 mg into the skin once a week., Disp: 6 mL, Rfl: 1    zolpidem 5 MG Oral Tab, Take 1 tablet (5 mg total) by mouth nightly as needed for Sleep. When traveling, Disp: , Rfl:     lisinopril 10 MG Oral Tab, Take 1 tablet (10 mg total) by mouth daily., Disp: 90 tablet, Rfl: 2    busPIRone 10 MG Oral Tab, Take 1 tablet (10 mg total) by mouth in the morning and 1 tablet (10 mg total) before bedtime., Disp: 180 tablet, Rfl: 3    Continuous Glucose Sensor (DEXCOM G7 SENSOR) Does not apply Misc, 1 each Every 10 days. Use as directed every 10 days (Patient not taking: Reported on 1/11/2025), Disp: 9 each, Rfl: 1    progesterone 200 MG Oral Cap, Take 1 capsule (200 mg total) by mouth nightly., Disp: , Rfl:     TESTOSTERONE BU, Place inside cheek., Disp: , Rfl:     Emollient (DHEA EX), Apply 50 mg topically., Disp: , Rfl:     Lactobacillus (PROBIOTIC ACIDOPHILUS) Oral Tab, Take by mouth., Disp: , Rfl:     B Complex Vitamins (VITAMIN B COMPLEX OR), Inject as directed., Disp: , Rfl:     Magnesium 200 MG Oral Tab, Take by mouth., Disp: , Rfl:     Cholecalciferol (VITAMIN D3) 250 MCG (05826 UT) Oral Cap, , Disp: , Rfl:      Allergies:   Allergies   Allergen Reactions    Succinylcholine OTHER (SEE COMMENTS) and UNKNOWN     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       Social History:   Social History     Socioeconomic History    Marital status: Single   Tobacco Use    Smoking status: Never     Passive exposure: Never    Smokeless tobacco: Never   Substance and Sexual Activity    Alcohol use: Yes     Alcohol/week: 2.0 standard drinks of alcohol     Types: 2 Glasses of wine per week     Comment: occa    Drug use: Never     Comment: occ gummies    Other  Topics Concern    Seat Belt Yes       Medical History:   Past Medical History:    Anesthesia complication    Anxiety    Arthritis    Diabetes (HCC)    Disorder of thyroid    Hypothyroidism     + Hashimoto    Obesity    Pseudocholinesterase deficiency       Surgical history:   Past Surgical History:   Procedure Laterality Date    Ablation  2021    cervical    Colonoscopy N/A 09/10/2022    Procedure: COLONOSCOPY;  Surgeon: Brooke Claros MD;  Location: [facility] ENDOSCOPY    Benedict biopsy stereo nodule 1 site left (cpt=[id number])  2022    CLIP - TOP HAT      2014    Other surgical history Right     meniscus repair     Other surgical history      exp lap     Other surgical history      sinus surg x 2          PHYSICAL EXAM  Vitals:    01/15/25 1134   BP: 124/70   Pulse: 66   Weight: 226 lb 12.8 oz (102.9 kg)   Height: 5' 6\" (1.676 m)     General Appearance:  alert, well developed, in no acute distress  Eyes:  normal conjunctivae, sclera., normal sclera and normal pupils  Ears/Nose/Mouth/Throat/Neck:  no palpable thyroid nodules or cervical lymphadenopathy  Neck: Trachea midline: Normal  Psychiatric:  oriented to time, self, and place  Nutritional:  no abnormal weight gain or loss    Lab Data:   Lab Results   Component Value Date     2025    A1C 5.8 (H) 2025     Lab Results   Component Value Date     (H) 2025    BUN 12 2025    BUNCREA 11.9 2025    CREATSERUM 1.01 2025    ANIONGAP 6 2025    GFRNAA 84 2024    GFRAA 85 2021    CA 9.5 2025    OSMOCALC 290 2025    ALKPHO 51 2025    AST 23 2025    ALT 29 2025    BILT 0.5 2025    TP 7.3 2025    ALB 4.4 2025    GLOBULIN 2.9 2025     2025    K 4.2 2025     2025    CO2 28.0 2025     Lab Results   Component Value Date    CHOLEST 196 2025    TRIG 120 2025    HDL 40 2025     (H)  01/11/2025    VLDL 22 01/11/2025    NONHDLC 156 (H) 01/11/2025     Lab Results   Component Value Date    MALBP <0.30 01/11/2025    CREUR 81.20 01/11/2025         ASSESSMENT/PLAN:  This is a 50 year-old woman here for evaluation and management of uncontrolled type 2 diabetes. We discussed the ABCs of DM.     1.) Hyperglycemia Management- We discussed the importance of glycemic control to prevent complications of diabetes. We discussed the importance of SBGM. I offered and provided patient education materials and offered a blood glucose log book.   - Continue metformin ER 500mg PO qhs  - Stop Trulicity  - Start Mounjaro 10mg qweekly  - Continue checking blood sugars fasting and 2 hours after biggest meal    2.) Management of Diabetic Complications- We discussed the complications of diabetes include retinopathy, neuropathy, nephropathy and cardiovascular disease.   - Ophtho- up to date  - Flu and Covid vaccine- up to date  - BP- at goal, on meds  - Lipids- check in 3 months, start atorvastatin 10mg PO qhs  - MAC- check in 6 months  - CMP- check in 6 months  - Neuropathy- none  - CAD- none    3.) Lifestyle Management for Diabetes- We discussed importance of a low CHO diet, and recommend 45gm per meal or 135gm per day. We discussed the importance of trying to follow a Mediterranean diet, with an emphasis on vegetables at every meal, with lots whole grains, and protein from either plant-based sources, or poultry and fish.   - Diet- eats healthy  - Exercise- will exercise more    4.) Hypothyroidism  - Continue Levothyroxine 75mcg PO qday  - Check TSH and FT4 in 1 year    Return to clinic in 3 months    Prior to this encounter, I spent over 15 minutes with preparing for the visit, including reviewing documents from other specialties as well as from PCP and going over test results and imaging studies. During the face to face encounter, I spent an additional 15 minutes which were determined for follow-up. Greater than 50% of  the time was spent in counseling, anticipatory guidance, and coordination of care. Patient concerns were answered to the best of my knowledge.       1/15/25  Sharla Stuart MD

## 2025-01-31 DIAGNOSIS — E06.3 HASHIMOTO'S THYROIDITIS: ICD-10-CM

## 2025-01-31 NOTE — TELEPHONE ENCOUNTER
Current Outpatient Medications:       levothyroxine 75 MCG Oral Tab, Take 1 tablet (75 mcg total) by mouth before breakfast., Disp: 90 tablet, Rfl: 3

## 2025-02-04 RX ORDER — LEVOTHYROXINE SODIUM 75 UG/1
75 TABLET ORAL
Qty: 90 TABLET | Refills: 2 | Status: SHIPPED | OUTPATIENT
Start: 2025-02-04

## 2025-02-04 NOTE — TELEPHONE ENCOUNTER
[employer] message sent to patient, await reply.   Last ref 11-18-24 #90 +3 to [facility].         Refill Passed Per Protocol    Requested Prescriptions   Pending Prescriptions Disp Refills    levothyroxine 75 MCG Oral Tab 90 tablet 3     Sig: Take 1 tablet (75 mcg total) by mouth before breakfast.       Thyroid Medication Protocol Passed - 2/3/2025 11:12 PM        Passed - TSH in past 12 months        Passed - Last TSH value is normal     Lab Results   Component Value Date    TSH 1.415 01/11/2025                 Passed - In person appointment or virtual visit in the past 12 mos or appointment in next 3 mos     Recent Outpatient Visits              2 weeks ago Type 2 diabetes mellitus with hyperglycemia, without long-term current use of insulin (HCC)    [facility] Sharla Stuart MD    Office Visit    3 weeks ago Preop examination    [facility]    Office Visit    3 weeks ago Physical exam, annual    [facility] Mattson [facility]    Office Visit    2 months ago Primary osteoarthritis of right knee    [place] Shoaib Arteaga MD    Office Visit    5 months ago Primary osteoarthritis of right knee    [place] Jaswant Gay MD    Office Visit          Future Appointments         Provider Department Appt Notes    In 2 months Shalra Stuart MD [facility] 3 month                    Passed - Medication is active on med list             Future Appointments         Provider Department Appt Notes    In 2 months Sharla Stuart MD [facility] 3 month          Recent Outpatient Visits              2 weeks ago Type 2 diabetes mellitus with hyperglycemia, without long-term current use  of insulin (HCC)    [facility] [place] Sharla Stuart MD    Office Visit    3 weeks ago Preop examination    [facility], [facility], Melissa Huerta MD    Office Visit    3 weeks ago Physical exam, annual    [facility], Melissa Huerta MD    Office Visit    2 months ago [facility] oste[facility] right [facility]    [facility] Visit    5 months ago Primary osteoarthritis of right knee    [facility] [place]    Office Visit

## 2025-02-17 ENCOUNTER — HOSPITAL ENCOUNTER (EMERGENCY)
Facility: HOSPITAL | Age: 50
Discharge: HOME | End: 2025-02-17
Attending: EMERGENCY MEDICINE
Payer: COMMERCIAL

## 2025-02-17 ENCOUNTER — APPOINTMENT (OUTPATIENT)
Dept: GENERAL RADIOLOGY | Facility: HOSPITAL | Age: 50
End: 2025-02-17
Attending: EMERGENCY MEDICINE
Payer: COMMERCIAL

## 2025-02-17 ENCOUNTER — APPOINTMENT (OUTPATIENT)
Dept: CT IMAGING | Facility: HOSPITAL | Age: 50
End: 2025-02-17
Attending: EMERGENCY MEDICINE
Payer: COMMERCIAL

## 2025-02-17 ENCOUNTER — PATIENT MESSAGE (OUTPATIENT)
Dept: ENDOCRINOLOGY CLINIC | Facility: CLINIC | Age: 50
End: 2025-02-17

## 2025-02-17 VITALS
OXYGEN SATURATION: 97 % | DIASTOLIC BLOOD PRESSURE: 82 MMHG | RESPIRATION RATE: 18 BRPM | HEART RATE: 80 BPM | TEMPERATURE: 98 F | WEIGHT: 220 LBS | HEIGHT: 66 IN | BODY MASS INDEX: 35.36 KG/M2 | SYSTOLIC BLOOD PRESSURE: 144 MMHG

## 2025-02-17 DIAGNOSIS — R06.00 DYSPNEA, UNSPECIFIED TYPE: Primary | ICD-10-CM

## 2025-02-17 DIAGNOSIS — E11.65 TYPE 2 DIABETES MELLITUS WITH HYPERGLYCEMIA, WITHOUT LONG-TERM CURRENT USE OF INSULIN (HCC): Primary | ICD-10-CM

## 2025-02-17 DIAGNOSIS — I15.9 SECONDARY HYPERTENSION: ICD-10-CM

## 2025-02-17 LAB
ALBUMIN SERPL-MCNC: 4.8 G/DL (ref 3.2–4.8)
ALP LIVER SERPL-CCNC: 86 U/L
ALT SERPL-CCNC: 36 U/L
ANION GAP SERPL CALC-SCNC: 8 MMOL/L (ref 0–18)
AST SERPL-CCNC: 29 U/L (ref ?–34)
ATRIAL RATE: 80 BPM
BASOPHILS # BLD AUTO: 0.08 X10(3) UL (ref 0–0.2)
BASOPHILS NFR BLD AUTO: 0.6 %
BILIRUB DIRECT SERPL-MCNC: 0.1 MG/DL (ref ?–0.3)
BILIRUB SERPL-MCNC: 0.5 MG/DL (ref 0.3–1.2)
BILIRUB UR QL: NEGATIVE
BUN BLD-MCNC: 15 MG/DL (ref 9–23)
BUN/CREAT SERPL: 16.5 (ref 10–20)
CALCIUM BLD-MCNC: 9.3 MG/DL (ref 8.7–10.4)
CHLORIDE SERPL-SCNC: 104 MMOL/L (ref 98–112)
CLARITY UR: CLEAR
CO2 SERPL-SCNC: 26 MMOL/L (ref 21–32)
CREAT BLD-MCNC: 0.91 MG/DL
D DIMER PPP FEU-MCNC: 3.39 UG/ML FEU (ref ?–0.5)
DEPRECATED RDW RBC AUTO: 38.9 FL (ref 35.1–46.3)
EGFRCR SERPLBLD CKD-EPI 2021: 77 ML/MIN/1.73M2 (ref 60–?)
EOSINOPHIL # BLD AUTO: 0.21 X10(3) UL (ref 0–0.7)
EOSINOPHIL NFR BLD AUTO: 1.6 %
ERYTHROCYTE [DISTWIDTH] IN BLOOD BY AUTOMATED COUNT: 12.8 % (ref 11–15)
GLUCOSE BLD-MCNC: 121 MG/DL (ref 70–99)
GLUCOSE BLDC GLUCOMTR-MCNC: 143 MG/DL (ref 70–99)
GLUCOSE UR-MCNC: NORMAL MG/DL
HCT VFR BLD AUTO: 40.5 %
HGB BLD-MCNC: 14 G/DL
HGB UR QL STRIP.AUTO: NEGATIVE
IMM GRANULOCYTES # BLD AUTO: 0.06 X10(3) UL (ref 0–1)
IMM GRANULOCYTES NFR BLD: 0.5 %
KETONES UR-MCNC: NEGATIVE MG/DL
LEUKOCYTE ESTERASE UR QL STRIP.AUTO: NEGATIVE
LYMPHOCYTES # BLD AUTO: 1.94 X10(3) UL (ref 1–4)
LYMPHOCYTES NFR BLD AUTO: 14.9 %
MCH RBC QN AUTO: 29.1 PG (ref 26–34)
MCHC RBC AUTO-ENTMCNC: 34.6 G/DL (ref 31–37)
MCV RBC AUTO: 84.2 FL
MONOCYTES # BLD AUTO: 0.45 X10(3) UL (ref 0.1–1)
MONOCYTES NFR BLD AUTO: 3.4 %
NEUTROPHILS # BLD AUTO: 10.31 X10 (3) UL (ref 1.5–7.7)
NEUTROPHILS # BLD AUTO: 10.31 X10(3) UL (ref 1.5–7.7)
NEUTROPHILS NFR BLD AUTO: 79 %
NITRITE UR QL STRIP.AUTO: NEGATIVE
OSMOLALITY SERPL CALC.SUM OF ELEC: 288 MOSM/KG (ref 275–295)
P AXIS: 44 DEGREES
P-R INTERVAL: 160 MS
PH UR: 5.5 [PH] (ref 5–8)
PLATELET # BLD AUTO: 435 10(3)UL (ref 150–450)
POTASSIUM SERPL-SCNC: 4.4 MMOL/L (ref 3.5–5.1)
PROT SERPL-MCNC: 7.6 G/DL (ref 5.7–8.2)
PROT UR-MCNC: NEGATIVE MG/DL
Q-T INTERVAL: 356 MS
QRS DURATION: 84 MS
QTC CALCULATION (BEZET): 410 MS
R AXIS: 19 DEGREES
RBC # BLD AUTO: 4.81 X10(6)UL
SODIUM SERPL-SCNC: 138 MMOL/L (ref 136–145)
SP GR UR STRIP: 1.01 (ref 1–1.03)
T AXIS: 24 DEGREES
UROBILINOGEN UR STRIP-ACNC: NORMAL
VENTRICULAR RATE: 80 BPM
WBC # BLD AUTO: 13.1 X10(3) UL (ref 4–11)

## 2025-02-17 PROCEDURE — 81003 URINALYSIS AUTO W/O SCOPE: CPT | Performed by: EMERGENCY MEDICINE

## 2025-02-17 PROCEDURE — 93005 ELECTROCARDIOGRAM TRACING: CPT

## 2025-02-17 PROCEDURE — 85025 COMPLETE CBC W/AUTO DIFF WBC: CPT | Performed by: EMERGENCY MEDICINE

## 2025-02-17 PROCEDURE — 80048 BASIC METABOLIC PNL TOTAL CA: CPT | Performed by: EMERGENCY MEDICINE

## 2025-02-17 PROCEDURE — 80076 HEPATIC FUNCTION PANEL: CPT | Performed by: EMERGENCY MEDICINE

## 2025-02-17 PROCEDURE — 96374 THER/PROPH/DIAG INJ IV PUSH: CPT

## 2025-02-17 PROCEDURE — 93010 ELECTROCARDIOGRAM REPORT: CPT

## 2025-02-17 PROCEDURE — 71045 X-RAY EXAM CHEST 1 VIEW: CPT | Performed by: EMERGENCY MEDICINE

## 2025-02-17 PROCEDURE — 71260 CT THORAX DX C+: CPT | Performed by: EMERGENCY MEDICINE

## 2025-02-17 PROCEDURE — 85379 FIBRIN DEGRADATION QUANT: CPT | Performed by: EMERGENCY MEDICINE

## 2025-02-17 PROCEDURE — 96361 HYDRATE IV INFUSION ADD-ON: CPT

## 2025-02-17 PROCEDURE — 99285 EMERGENCY DEPT VISIT HI MDM: CPT

## 2025-02-17 PROCEDURE — 82962 GLUCOSE BLOOD TEST: CPT

## 2025-02-17 RX ORDER — ONDANSETRON 2 MG/ML
4 INJECTION INTRAMUSCULAR; INTRAVENOUS ONCE
Status: COMPLETED | OUTPATIENT
Start: 2025-02-17 | End: 2025-02-17

## 2025-02-17 NOTE — ED QUICK NOTES
Patient ambulatory to ED   Patient had surgery on Feb.5. 2025  Since surgery patient states she has not been feeling well.  Patient states she has had low grade fevers, n/v, sob and body aches .

## 2025-02-17 NOTE — TELEPHONE ENCOUNTER
Patient sent average glucose levels report:  Before breakfast: 174  After breakfast: 204  After lunch: 205  After dinner: 188  After snacks: 199  Last visit with endocrinology on 1/15/25:  - Continue metformin ER 500mg PO qhs  - Stop Trulicity  - Start Mounjaro 10mg qweekly  - Continue checking blood sugars fasting and 2 hours after biggest meal    Per patient she is still talking Trulicity 3 mg weekly, last injection on Thursday. Taking Metformin 250 mg at night.  Per chart review patient is currently in the ER. Will follow up.   Message routed to Dr. Stuart.

## 2025-02-17 NOTE — ED INITIAL ASSESSMENT (HPI)
R knee surgery on Feb 5. Since surgery low grade fever, chills, N/V/D, weakness, dyspnea on exertion, pain. Barely able to eat. Weaned off opioids. High BP and blood sugar. Blood sugar 143 in triage.

## 2025-02-19 RX ORDER — DULAGLUTIDE 3 MG/.5ML
3 INJECTION, SOLUTION SUBCUTANEOUS WEEKLY
Qty: 6 ML | Refills: 1 | Status: SHIPPED | OUTPATIENT
Start: 2025-02-19

## 2025-02-19 RX ORDER — GLIPIZIDE 5 MG/1
5 TABLET ORAL
Qty: 180 TABLET | Refills: 0 | Status: SHIPPED | OUTPATIENT
Start: 2025-02-19

## 2025-02-19 NOTE — TELEPHONE ENCOUNTER
Had knee replacement 2 wks ago, knee is working well and she's walking well. Patient states she was not tolerating pain medications. Blood sugars and blood pressure have been high. She has been talking with surgeon and tried all medications suggested without relief. She has stopped all medications prescribed such as celebrex, gabapentin, oxycotin, hydrocodone. States she's been having low grade fever of 99, feels winded, wakes up \"drenched in sweat\" during the night. States she had hot flashes before surgery, but not to this extent and doesn't know if it's related to perimenopause or surgery.   Patient went to the ER on 2/17/25 and was told to take more metformin and lisinopril. Received fluids and felt slightly better, but last night woke up during the night sweating. Feels like she can't regulate body temperature. Did not eat much yesterday, skipped dinner, and glucose as below:    2/19 183 without having eaten dinner  146 now. Had protein shake around 0800    Last A1c value was 5.8% done 1/11/2025.    /85 this morning    Has not started Mounjaro. Has one injection of Trulicity left, will take tomorrow. Is concerned about starting new medication.   Patient tearful during the call saying she wants to sleep. States he was not checked for flu or covid. Is not on steroids. Currently taking.   Metformin 500 mg daily at night  Trulicity 3 mg weekly - she would like to continue on Trulicity until she feels better.   CBC done in the ER shows mildly elevated WBCs and patient asking for further information on this.   Message routed to provider. Has f/u 4/23/25.

## 2025-02-19 NOTE — ED PROVIDER NOTES
Patient Seen in: [facility]    History     Chief Complaint   Patient presents with    Nausea/Vomiting/Diarrhea    Difficulty Breathing     Stated Complaint: hyperglycemia / fever    HPI    Patient complains of feeling off since her knee surgery.  Knee is doing great but complains of fatigue, dyspnea with elevated blood sugar.  No fever.  No calf pain.  + nausea.  No cough or pleuritic chest pain..    Alleviating factors: none  Exacerbating factors: unclear    Past Medical History:    Anesthesia complication    Anxiety    Arthritis    Diabetes (HCC)    Disorder of thyroid    Hypothyroidism     + Hashimoto    Obesity    Pseudocholinesterase deficiency       Past Surgical History:   Procedure Laterality Date    Ablation  2021    cervical    Colonoscopy N/A 09/10/2022    Procedure: COLONOSCOPY;  Surgeon: Brooke Claros MD;  Location: [facility] ENDOSCOPY    Knee replacement surgery      Benedict biopsy stereo nodule 1 site left (cpt=19081)  2022    CLIP - TOP HAT      2014    Other surgical history Right     meniscus repair     Other surgical history      exp lap     Other surgical history      sinus surg x 2             Family History   Problem Relation Age of Onset    Heart Disorder Father     Hypertension Father     Depression Mother     Obesity Mother     Diabetes Paternal Grandmother        Social History     Socioeconomic History    Marital status: Single   Tobacco Use    Smoking status: Never     Passive exposure: Never    Smokeless tobacco: Never   Substance and Sexual Activity    Alcohol use: Yes     Alcohol/week: 2.0 standard drinks of alcohol     Types: 2 Glasses of wine per week     Comment: occa    Drug use: Never     Comment: occ gummies    Other Topics Concern    Seat Belt Yes       Review of Systems    Positive for stated complaint: hyperglycemia / fever  Other systems are as noted in HPI.  Constitutional and vital signs reviewed.      All other systems  reviewed and negative except as noted above.    PSFH elements reviewed from today and agreed except as otherwise stated in HPI.    Physical Exam     ED Triage Vitals [02/17/25 1021]   BP (!) 155/99   Pulse 102   Resp 22   Temp 97.7 °F (36.5 °C)   Temp src Oral   SpO2 99 %   O2 Device None (Room air)       Current:/82   Pulse 80   Temp 97.7 °F (36.5 °C) (Oral)   Resp 18   Ht 167.6 cm (5' 6\")   Wt 99.8 kg   SpO2 97%   BMI 35.51 kg/m²    PULSE OX nl  GENERAL: appears tired non toxic  HEAD: normocephalic, atraumatic,   EYES: PERRLA, EOMI, conj sclera clear  THROAT: mmm, no lesions  NECK: supple, no meningeal signs  LUNGS: no resp distress, cta bilateral  CARDIO: RRR without murmur  GI: abdomen is soft and non tender, no masses, nl bowel sounds   EXTREMITIES: knee incision is cdi no erythema no calf tenderness or edema  NEURO: alert and oiented *3, 2-12 intact, no focal deficit noted  SKIN: good skin turgor, no  rashes  PSYCH: calm, cooperative,    Differential includes:pe vs. Viral illness vs med side effect    ED Course     Labs Reviewed   BASIC METABOLIC PANEL (8) - Abnormal; Notable for the following components:       Result Value    Glucose 121 (*)     All other components within normal limits   CBC WITH DIFFERENTIAL WITH PLATELET - Abnormal; Notable for the following components:    WBC 13.1 (*)     Neutrophil Absolute Prelim 10.31 (*)     Neutrophil Absolute 10.31 (*)     All other components within normal limits   D-DIMER - Abnormal; Notable for the following components:    D-Dimer 3.39 (*)     All other components within normal limits   POCT GLUCOSE - Abnormal; Notable for the following components:    POC Glucose  143 (*)     All other components within normal limits   HEPATIC FUNCTION PANEL (7) - Normal   URINALYSIS WITH CULTURE REFLEX     EKG    Rate, intervals and axes as noted on EKG Report.  Rate: 80  Rhythm: Sinus Rhythm  Reading: Normal intervals, normal EKG             [facility]       Cardiac  Monitor:   Pulse Readings from Last 1 Encounters:   02/17/25 80   , sinus, 80  interpreted by me.    Radiology findings:   No results found.    XR CHEST AP PORTABLE  (CPT=71045)    Result Date: 2/17/2025  CONCLUSION:   No focal opacity, pleural effusion, or pneumothorax.    Dictated by (CST): Turner Bowman MD on 2/17/2025 at 11:38 AM     Finalized by (CST): Turner Bowman MD on 2/17/2025 at 11:39 AM           I reviewed xray noted no infiltrates no pneumothorax      Medical Decision Making  Problems Addressed:  Dyspnea, unspecified type: acute illness or injury     Details: Nl cxr and neg ddimer no pe or pneumonia likely  Secondary hypertension: chronic illness or injury with exacerbation, progression, or side effects of treatment    Amount and/or Complexity of Data Reviewed  Labs: ordered. Decision-making details documented in ED Course.  Radiology: ordered and independent interpretation performed. Decision-making details documented in ED Course.  ECG/medicine tests: ordered and independent interpretation performed. Decision-making details documented in ED Course.  Discussion of management or test interpretation with external provider(s): Feeling better after fluids will dc home        Disposition and Plan     Clinical Impression:  1. Dyspnea, unspecified type    2. Secondary hypertension        Disposition:  Discharge    Follow-up:  Melissa Mattson MD  [address] [facility] [id number] [phone]    Follow up      Melissa Mattson MD  [address] [facility] [id number] [phone]    Follow up        Medications Prescribed:  Discharge Medication List as of 2/17/2025  2:15 PM

## 2025-02-20 ENCOUNTER — NURSE TRIAGE (OUTPATIENT)
Dept: INTERNAL MEDICINE CLINIC | Facility: CLINIC | Age: 50
End: 2025-02-20

## 2025-02-20 NOTE — TELEPHONE ENCOUNTER
Action Requested: Summary for Provider     []  Critical Lab, Recommendations Needed  [] Need Additional Advice  []   FYI    []   Need Orders  [] Need Medications Sent to Pharmacy  []  Other     SUMMARY: c/o elevated blood pressure and generally not feeling well for two weeks post surgery. Has been to ER, has followed up with surgery who stated she needs to be seen by her primary. Scheduled an appointment for tomorrow with TALIA.     Reason for call: Blood Pressure  Onset: two weeks     The patient states she had surgery two weeks ago and since then her blood pressure has been very elevated and she overall is not feeling well. She was seen in the ER and they increased her lisinopril and she states it has not been helping. She states she is very tired, having diarrhea and nausea and loss of appetite and weak. Her blood sugars have also been off but that is being managed by her endocrinologist. She called her surgeons office and they stated to follow up with her primary office. Her BP over the last three days has been:    134/80  146/87  151/82    Per protocol the patient should be seen in the office within two weeks, scheduled an appointment for tomorrow due to combination of symptoms.    Future Appointments   Date Time Provider Department Center   2/21/2025 10:00 AM Margareth Bailey APRN [facility]   4/23/2025  1:00 PM Sharla Stuart MD [facility]       Reason for Disposition   Systolic BP >= 130 OR Diastolic >= 80, and is taking BP medications    Protocols used: Blood Pressure - High-A-[place]

## 2025-02-21 ENCOUNTER — LAB ENCOUNTER (OUTPATIENT)
Dept: LAB | Age: 50
End: 2025-02-21
Attending: Nurse Practitioner
Payer: COMMERCIAL

## 2025-02-21 ENCOUNTER — OFFICE VISIT (OUTPATIENT)
Dept: INTERNAL MEDICINE CLINIC | Facility: CLINIC | Age: 50
End: 2025-02-21
Payer: COMMERCIAL

## 2025-02-21 VITALS
HEART RATE: 87 BPM | DIASTOLIC BLOOD PRESSURE: 78 MMHG | HEIGHT: 66 IN | OXYGEN SATURATION: 98 % | SYSTOLIC BLOOD PRESSURE: 128 MMHG | BODY MASS INDEX: 36 KG/M2 | TEMPERATURE: 98 F

## 2025-02-21 DIAGNOSIS — D72.829 LEUKOCYTOSIS, UNSPECIFIED TYPE: Primary | ICD-10-CM

## 2025-02-21 DIAGNOSIS — I10 PRIMARY HYPERTENSION: ICD-10-CM

## 2025-02-21 DIAGNOSIS — D72.829 LEUKOCYTOSIS, UNSPECIFIED TYPE: ICD-10-CM

## 2025-02-21 DIAGNOSIS — Z96.651 STATUS POST RIGHT KNEE REPLACEMENT: ICD-10-CM

## 2025-02-21 DIAGNOSIS — E11.65 TYPE 2 DIABETES MELLITUS WITH HYPERGLYCEMIA, WITHOUT LONG-TERM CURRENT USE OF INSULIN (HCC): ICD-10-CM

## 2025-02-21 DIAGNOSIS — F41.9 ANXIETY: ICD-10-CM

## 2025-02-21 LAB
BASOPHILS # BLD AUTO: 0.09 X10(3) UL (ref 0–0.2)
BASOPHILS NFR BLD AUTO: 0.7 %
DEPRECATED RDW RBC AUTO: 39.7 FL (ref 35.1–46.3)
EOSINOPHIL # BLD AUTO: 0.29 X10(3) UL (ref 0–0.7)
EOSINOPHIL NFR BLD AUTO: 2.3 %
ERYTHROCYTE [DISTWIDTH] IN BLOOD BY AUTOMATED COUNT: 12.9 % (ref 11–15)
HCT VFR BLD AUTO: 39.9 %
HGB BLD-MCNC: 14.2 G/DL
IMM GRANULOCYTES # BLD AUTO: 0.07 X10(3) UL (ref 0–1)
IMM GRANULOCYTES NFR BLD: 0.6 %
LYMPHOCYTES # BLD AUTO: 2.51 X10(3) UL (ref 1–4)
LYMPHOCYTES NFR BLD AUTO: 19.8 %
MCH RBC QN AUTO: 30.5 PG (ref 26–34)
MCHC RBC AUTO-ENTMCNC: 35.6 G/DL (ref 31–37)
MCV RBC AUTO: 85.8 FL
MONOCYTES # BLD AUTO: 0.54 X10(3) UL (ref 0.1–1)
MONOCYTES NFR BLD AUTO: 4.3 %
NEUTROPHILS # BLD AUTO: 9.15 X10 (3) UL (ref 1.5–7.7)
NEUTROPHILS # BLD AUTO: 9.15 X10(3) UL (ref 1.5–7.7)
NEUTROPHILS NFR BLD AUTO: 72.3 %
PLATELET # BLD AUTO: 541 10(3)UL (ref 150–450)
RBC # BLD AUTO: 4.65 X10(6)UL
WBC # BLD AUTO: 12.7 X10(3) UL (ref 4–11)

## 2025-02-21 PROCEDURE — 99213 OFFICE O/P EST LOW 20 MIN: CPT | Performed by: NURSE PRACTITIONER

## 2025-02-21 PROCEDURE — 85025 COMPLETE CBC W/AUTO DIFF WBC: CPT

## 2025-02-21 PROCEDURE — 3078F DIAST BP <80 MM HG: CPT | Performed by: NURSE PRACTITIONER

## 2025-02-21 PROCEDURE — 36415 COLL VENOUS BLD VENIPUNCTURE: CPT

## 2025-02-21 PROCEDURE — 3074F SYST BP LT 130 MM HG: CPT | Performed by: NURSE PRACTITIONER

## 2025-02-21 RX ORDER — CEFADROXIL 500 MG/1
500 CAPSULE ORAL 2 TIMES DAILY
COMMUNITY
Start: 2025-01-31 | End: 2025-02-21

## 2025-02-21 RX ORDER — CELECOXIB 200 MG/1
200 CAPSULE ORAL DAILY
COMMUNITY
Start: 2025-01-31 | End: 2025-02-21

## 2025-02-21 RX ORDER — OMEPRAZOLE 40 MG/1
40 CAPSULE, DELAYED RELEASE ORAL
COMMUNITY
Start: 2025-01-31 | End: 2025-02-21

## 2025-02-21 RX ORDER — GABAPENTIN 100 MG/1
100 CAPSULE ORAL 2 TIMES DAILY
COMMUNITY
Start: 2025-01-31 | End: 2025-02-21

## 2025-02-21 RX ORDER — ASPIRIN 81 MG/1
81 TABLET ORAL 2 TIMES DAILY
COMMUNITY
Start: 2025-01-31 | End: 2025-02-28

## 2025-02-21 RX ORDER — ONDANSETRON 8 MG/1
8 TABLET, ORALLY DISINTEGRATING ORAL
COMMUNITY
Start: 2025-02-11

## 2025-02-21 RX ORDER — OXYCODONE HYDROCHLORIDE 5 MG/1
TABLET ORAL
COMMUNITY
Start: 2025-02-11 | End: 2025-02-21

## 2025-02-21 RX ORDER — AMOXICILLIN 250 MG
1 CAPSULE ORAL DAILY
COMMUNITY
Start: 2025-01-31

## 2025-02-21 RX ORDER — TRAMADOL HYDROCHLORIDE 50 MG/1
50 TABLET ORAL
COMMUNITY
Start: 2025-01-31

## 2025-02-21 RX ORDER — TIRZEPATIDE 10 MG/.5ML
INJECTION, SOLUTION SUBCUTANEOUS
COMMUNITY
End: 2025-02-21 | Stop reason: ALTCHOICE

## 2025-02-21 RX ORDER — HYDROCODONE BITARTRATE AND ACETAMINOPHEN 10; 325 MG/1; MG/1
1-2 TABLET ORAL
COMMUNITY
Start: 2025-02-06 | End: 2025-02-21

## 2025-02-21 NOTE — PROGRESS NOTES
Hospitalist Discharge Summary     Patient ID:  Abhinav Thompson  [id number]  16 y.o.  1982 9/6/2022    PCP on record: Carlos Johnson MD    Admit date: 9/6/2022  Discharge date and time: 9/18/2022    DISCHARGE DIAGNOSIS:  acute Hypoxic Respiratory Failure  2/2 to bilateral pleural effusions  Arden on ckd 4  Urinary retention  DM   HTN- Normocytic Anemia      CONSULTATIONS:  IP CONSULT TO NEPHROLOGY  IP CONSULT TO UROLOGY    Excerpted HPI from H&P of Opal Bowers MD:  CHIEF COMPLAINT: sob     HISTORY OF PRESENT ILLNESS:     Pily Altamirano is a 44 y.o.  male with a history of CKD, DM, Bipolar disorder who presents with sob for 1 day. Patient woke up this morning at 5am to sob, he endorses left sided sharp non radiating chest pain. His chest pain increases with inspiration and improves with rest. He denies cough, palpitations, fever chills, nausea or vomiting. His mom drove him to the hospital.  Patient was recently discharged from the hospital 8/20/22 and was discharged with hansen in place due to urinary retention, he was supposed to have it removed outpatient this week. We were asked to admit for work up and evaluation of the above problems. ______________________________________________________________________  DISCHARGE SUMMARY/HOSPITAL COURSE:  for full details see H&P, daily progress notes, labs, consult notes.    acute Hypoxic Respiratory Failure  2/2 to bilateral pleural effusions  Arden on ckd 4  Started on hemodialysis on 09/07  CT chest with moderate bilateral effusions L>R  On 3L O2, keep supplemental O2 for sats >90%  S/p  IV Lasix, he is oliguric  Troponin wnl, EKG without acute st segment changes  Pro BNP 5,688  ECHO with EF 57-24%, normal diastolic function (3/48/76)  No indication to repeat ECHO at this time  No improvement despite IV Lasix, nephrology decided to start hemodialysis via [address]  Patient discharged with the [facility], outpatient hemodialysis set Subjective:   Mindi Bob is a 50 year old female who presents for Follow - Up     50 year old female with PMH HTN, DM, and recent R total knee presents for follow up, and to discuss concerns with elevated blood pressure and WBC count at ER visit earlier this week.    States she hasn't been feeling great since knee replacement.  ER visit on Monday because she was feeling so lousy.  They gave her a bag of IV fluids and check some labs, her WBC count was 13 at that time.  She felt much better after IVF.    States her blood pressure home readings are too high. They doubled lisinopril dose on Monday in ER  Readings since then 131-151 / 80-87. She sometimes has her legs crossed when doing the readings and has her arm hanging straight down.    Having some mild nausea, low appetite, some loose stool -describes as loose and formed, occurring maybe 3-4 times daily for the last several days.  She did take a course of cefadroxil twice daily x 10 days after knee surgery.  She is not having any abdominal pain or fevers. Stool is not watery, no blood in stool    She is here today because she is still feeling run down. She expresses feeling a tremendous amount of anxiety regarding getting back to work and taking care of her house and kids. Work is piling up and she feels very anxious about it. Not used to slowing down.  States she is generally checking her blood pressure and blood sugar is way more often than she normally would, and she is feeling anxious about both of the readings.    Her blood sugars have also been elevated, she has been in contact with her endocrinologist who has adjusted her meds.     R Knee looks great - had staples removed yesterday - no swelling, redness, drainage. A little soreness because was up doing chores yesterday.    History/Other:    Chief Complaint Reviewed and Verified  No Further Nursing Notes to   Review  Tobacco Reviewed  Allergies Reviewed  Medications Reviewed    Problem List Reviewed   Medical History Reviewed  Surgical History   Reviewed  OB Status Reviewed  Family History Reviewed  Social History   Reviewed         Tobacco:       Current Outpatient Medications   Medication Sig Dispense Refill    aspirin 81 MG Oral Tab EC Take 1 tablet (81 mg total) by mouth 2 (two) times daily.      Dulaglutide (TRULICITY) 3 MG/0.5ML Subcutaneous Solution Auto-injector Inject 3 mg into the skin once a week. 6 mL 1    glipiZIDE 5 MG Oral Tab Take 1 tablet (5 mg total) by mouth 2 (two) times daily before meals. 180 tablet 0    levothyroxine 75 MCG Oral Tab Take 1 tablet (75 mcg total) by mouth before breakfast. 90 tablet 2    Ferrous Bisglycinate Chelate 28 MG Oral Cap       minoxidil 2.5 MG Oral Tab       traZODone 100 MG Oral Tab Take 1 tablet (100 mg total) by mouth nightly.      lisinopril 10 MG Oral Tab Take 1 tablet (10 mg total) by mouth daily. 90 tablet 2    busPIRone 10 MG Oral Tab Take 1 tablet (10 mg total) by mouth in the morning and 1 tablet (10 mg total) before bedtime. 180 tablet 3    progesterone 200 MG Oral Cap Take 1 capsule (200 mg total) by mouth nightly.      TESTOSTERONE BU Place inside cheek.      Emollient (DHEA EX) Apply 50 mg topically.      Lactobacillus (PROBIOTIC ACIDOPHILUS) Oral Tab Take by mouth.      B Complex Vitamins (VITAMIN B COMPLEX OR) Inject as directed.      Magnesium 200 MG Oral Tab Take by mouth.      Cholecalciferol (VITAMIN D3) 250 MCG (27484 UT) Oral Cap       ondansetron 8 MG Oral Tablet Dispersible Place 1 tablet (8 mg total) inside cheek. (Patient not taking: Reported on 2/21/2025)      sennosides-docusate 8.6-50 MG Oral Tab Take 1 tablet by mouth daily. (Patient not taking: Reported on 2/21/2025)      MOUNJARO 10 MG/0.5ML Subcutaneous Solution Auto-injector ADMINISTER 10 MG UNDER THE SKIN 1 TIME A WEEK (Patient not taking: Reported on 2/21/2025)      traMADol 50 MG Oral Tab Take 1 tablet (50 mg total) by mouth. (Patient not taking: Reported on 2/21/2025)    up     Urinary retention  Beard in place from last admission, failed voiding trial, Beard replaced  Cont. tamsulosin     DM [id number] units SSI  HTN- cont. meds  Normocytic Anemia At baseline Hgb monitor  Correct Electrolytes as needed  PT/OT   CM/SW          _______________________________________________________________________  Patient seen and examined by me on discharge day. Pertinent Findings:  Gen:    Not in distress  Chest: Clear lungs  CVS:   Regular rhythm. No edema  Abd:  Soft, not distended, not tender  Neuro:  Alert, oriented x3  _______________________________________________________________________  DISCHARGE MEDICATIONS:   Discharge Medication List as of 9/12/2022  3:25 PM        CONTINUE these medications which have CHANGED    Details   insulin glargine (LANTUS) 100 unit/mL injection 20 Units by SubCUTAneous route daily. , Normal, Disp-1 mL, R-0      rosuvastatin (CRESTOR) 10 mg tablet Take 1 Tablet by mouth nightly for 60 days. , Normal, Disp-30 Tablet, R-1           CONTINUE these medications which have NOT CHANGED    Details   bacitracin zinc (BACITRACIN) ointment Apply  to affected area two (2) times a day., Normal, Disp-28 g, R-4      acetaminophen-codeine (Tylenol-Codeine #3) 300-30 mg per tablet Take 1 Tablet by mouth every six (6) hours as needed for Pain for up to 30 days. Max Daily Amount: 4 Tablets., Normal, Disp-15 Tablet, R-0      insulin glargine (Lantus Solostar U-100 Insulin) 100 unit/mL (3 mL) inpn 16 Units by SubCUTAneous route as needed (pump malfunction). , Normal, Disp-15 mL, R-0      Ketone Blood Test strp 50 Each by Does Not Apply route as needed for PRN Reason (Other) (as needed for suspected dka). , Normal, Disp-50 Strip, R-3      pantoprazole (PROTONIX) 40 mg tablet Take 1 Tablet by mouth Daily (before breakfast). , Normal, Disp-30 Tablet, R-0      sodium bicarbonate 650 mg tablet Take 1 Tablet by mouth three (3) times daily for 30 days. , Normal, Disp-90 Tablet, R-0    atorvastatin 10 MG Oral Tab Take 1 tablet (10 mg total) by mouth nightly. (Patient not taking: Reported on 2/21/2025) 90 tablet 0    zolpidem 5 MG Oral Tab Take 1 tablet (5 mg total) by mouth nightly as needed for Sleep. When traveling (Patient not taking: Reported on 2/21/2025)           Review of Systems:  Review of Systems  10 point review of systems otherwise negative with the exception of HPI and assessment and plan.    Objective:   /78   Pulse 87   Temp 97.6 °F (36.4 °C) (Temporal)   Ht 5' 6\" (1.676 m)   SpO2 98%   BMI 35.51 kg/m²  Estimated body mass index is 35.51 kg/m² as calculated from the following:    Height as of this encounter: 5' 6\" (1.676 m).    Weight as of 2/17/25: 220 lb (99.8 kg).      Physical Exam  Vitals reviewed.   Constitutional:       General: She is not in acute distress.     Appearance: She is not ill-appearing.   HENT:      Head: Normocephalic.   Cardiovascular:      Rate and Rhythm: Normal rate and regular rhythm.      Heart sounds: Normal heart sounds. No murmur heard.  Pulmonary:      Effort: Pulmonary effort is normal. No respiratory distress.      Breath sounds: Normal breath sounds.   Abdominal:      General: Bowel sounds are normal. There is no distension.      Palpations: Abdomen is soft. There is no mass.      Tenderness: There is no abdominal tenderness. There is no guarding.   Musculoskeletal:      Right knee: No swelling or erythema. No tenderness.      Comments: R knee surgical site well-approximated with pink dry healing skin - steristrips in place. No erythema, warmth, drainage, swelling.   Skin:     General: Skin is warm and dry.      Capillary Refill: Capillary refill takes less than 2 seconds.   Neurological:      Mental Status: She is alert.   Psychiatric:         Mood and Affect: Mood is anxious.         Assessment & Plan:   1. Leukocytosis, unspecified type (Primary)  -     CBC With Differential With Platelet; Future; Expected date: 02/21/2025  - Will  furosemide (Lasix) 80 mg tablet Take 1 Tablet by mouth two (2) times a day for 30 days. , Normal, Disp-60 Tablet, R-0      hydrALAZINE (APRESOLINE) 100 mg tablet Take 1 Tablet by mouth three (3) times daily for 30 days. , Normal, Disp-90 Tablet, R-0      finasteride (PROSCAR) 5 mg tablet Take 1 Tablet by mouth in the morning., Normal, Disp-30 Tablet, R-2      insulin aspart U-100 (NovoLOG U-100 Insulin aspart) 100 unit/mL injection Use as instructed via insulin pump. Dx code E10.65 max daily dose 50U, Normal, Disp-30 mL, R-2      pregabalin (Lyrica) 75 mg capsule Take 1 Capsule by mouth two (2) times a day. Max Daily Amount: 150 mg., Normal, Disp-60 Capsule, R-2      Walker (Ultra-Light Rollator) misc Use while ambulating, Print, Disp-1 Each, R-0      amLODIPine (NORVASC) 10 mg tablet Take 1 Tablet by mouth daily. , Normal, Disp-90 Tablet, R-1      cloNIDine (CATAPRES) 0.1 mg/24 hr ptwk 1 Patch by TransDERmal route every seven (7) days. , Normal, Disp-12 Patch, R-1      Dexcom G6  misc Use with the dexcom device to check blood sugars 4 times a day, Normal, Disp-1 Each, R-0, [id number] dx code E10.65      DULoxetine (CYMBALTA) 60 mg capsule Take 1 capsule by mouth once daily, Normal, Disp-90 Capsule, R-0      Dexcom G6 Sensor brandi Use as directed every 10 days, Normal, Disp-10 Each, R-11, [id number] dx code E10.65      Dexcom G6 Transmitter brandi Use as directed, Normal, Disp-10 Each, R-3, [id number] dx code E10.65      FreeStyle Connor 14 Day Sensor kit Use as directed every 14 days, Normal, Disp-2 Kit, R-2, PADMA      Insulin Needles, Disposable, 31 gauge x 5/16\" ndle Dx code E11.65, use 6x daily, Normal, Disp-200 Each, R-11      insulin aspart U-100 (NovoLOG Flexpen U-100 Insulin) 100 unit/mL (3 mL) inpn Take 1 unit for every 15 grams of carbohydrates, 1 unit for every 50 points >150.  Max daily dose 25U., Normal, Disp-15 mL, R-3      carvediloL (COREG) 12.5 mg tablet Take 1 Tablet by mouth two (2) recheck CBC today to trend WBC.  Discussed various reasons WBC can be elevated including but not limited to fighting infection, presence of infection, physiological stress.  2. Primary hypertension        - Reassurance provided that blood pressure readings are for the most part acceptable at home.  Discussed proper way to check her blood pressure at home with lites uncrossed, feet on the floor, arm elevated on a table with her palm up.  Encouraged her to decrease the number of times she is checking her BP daily as it may be contributing to anxiety and elevation of BP.  3. Type 2 diabetes mellitus with hyperglycemia, without long-term current use of insulin (HCC)        - Currently being managed by endocrinology.  Encouraged her to check her blood sugar only as often as they are requesting her to, as this is also providing her with some anxiety.  4. Anxiety        - Suspect that some of her elevated blood pressure readings may be in part due to feeling anxious about work and home responsibilities and being unable to perform them.  Encouraged her to give herself some sanchez, to give her body some time to settle after having had a major procedure.  5. Status post right knee replacement        - Was seen by orthopedics yesterday, surgical site looks great.  Continue physical therapy as ordered by Ortho.            Return if symptoms worsen or fail to improve.    TALIA Souza, 2/21/2025, 10:33 AM     This note was prepared using Dragon Medical voice recognition dictation software. As a result errors may occur. When identified, these errors have been corrected. While every attempt is made to correct errors during dictation discrepancies may still exist.   times daily (with meals). , Normal, Disp-60 Tablet, R-1      tamsulosin (FLOMAX) 0.4 mg capsule Take 0.4 mg by mouth daily. , Historical Med      pen needle, diabetic 30 gauge x 3/16\" ndle 5 Units by Does Not Apply route Before breakfast, lunch, and dinner., Normal, Disp-100 Each, R-3               Patient Follow Up Instructions: Activity: Activity as tolerated  Diet: Renal Diet  Wound Care: None needed      Follow-up Information       Follow up With Specialties Details Why Donta Haq MD Internal Medicine Physician Go on 9/13/2022 at 2:15pm for your PCP hospital follow up. [address] [facility]  [address] [place] [address] [address] [facility]      [address] [facility] [address]  Follow up on 9/14/2022 be there at 9 am with ID and insurance cards for admission paperwork - after this day start time will be 10 am. [address] [place] [address],[address] [facility], [address] [place] [address]  ph# [phone]  DTV#[phone]    [facility]  Schedule an appointment as soon as possible for a visit in 1 week(s)  America Barr [address] [facility] NP  Follow up on 10/6/2022 Thursday at 1:45 pm - bring ID, insurance cards, med list and copay if appropriate.  [address], Judy  [phone]          ________________________________________________________________    Risk of deterioration: High    Condition at Discharge:  Stable  __________________________________________________________________    Disposition  Home with family, no needs    ____________________________________________________________________    Code Status: Full Code  ___________________________________________________________________      Total time in minutes spent coordinating this discharge (includes going over instructions, follow-up, prescriptions, and preparing report for sign off to her PCP) :  >30 minutes    Signed:  Jose Luis Barfield MD

## 2025-03-18 ENCOUNTER — OFFICE VISIT (OUTPATIENT)
Dept: INTERNAL MEDICINE CLINIC | Facility: CLINIC | Age: 50
End: 2025-03-18
Payer: COMMERCIAL

## 2025-03-18 VITALS
SYSTOLIC BLOOD PRESSURE: 123 MMHG | BODY MASS INDEX: 36.16 KG/M2 | DIASTOLIC BLOOD PRESSURE: 81 MMHG | HEIGHT: 66 IN | OXYGEN SATURATION: 99 % | HEART RATE: 76 BPM | RESPIRATION RATE: 18 BRPM | TEMPERATURE: 98 F | WEIGHT: 225 LBS

## 2025-03-18 DIAGNOSIS — E66.01 MORBID (SEVERE) OBESITY DUE TO EXCESS CALORIES (HCC): ICD-10-CM

## 2025-03-18 DIAGNOSIS — E11.65 TYPE 2 DIABETES MELLITUS WITH HYPERGLYCEMIA, WITHOUT LONG-TERM CURRENT USE OF INSULIN (HCC): Primary | ICD-10-CM

## 2025-03-18 PROBLEM — R07.2 PRECORDIAL PAIN: Status: RESOLVED | Noted: 2021-06-11 | Resolved: 2025-03-18

## 2025-03-18 PROCEDURE — 3074F SYST BP LT 130 MM HG: CPT | Performed by: STUDENT IN AN ORGANIZED HEALTH CARE EDUCATION/TRAINING PROGRAM

## 2025-03-18 PROCEDURE — 3008F BODY MASS INDEX DOCD: CPT | Performed by: STUDENT IN AN ORGANIZED HEALTH CARE EDUCATION/TRAINING PROGRAM

## 2025-03-18 PROCEDURE — 3079F DIAST BP 80-89 MM HG: CPT | Performed by: STUDENT IN AN ORGANIZED HEALTH CARE EDUCATION/TRAINING PROGRAM

## 2025-03-18 PROCEDURE — 99214 OFFICE O/P EST MOD 30 MIN: CPT | Performed by: STUDENT IN AN ORGANIZED HEALTH CARE EDUCATION/TRAINING PROGRAM

## 2025-03-18 RX ORDER — TIRZEPATIDE 7.5 MG/.5ML
7.5 INJECTION, SOLUTION SUBCUTANEOUS WEEKLY
Qty: 2 ML | Refills: 0 | Status: SHIPPED | OUTPATIENT
Start: 2025-03-18

## 2025-03-18 NOTE — PROGRESS NOTES
Subjective     Chief Complaint   Patient presents with    \[facility]\"" Care     New pt. Wants just 1 doctor to discuss all health concerns        Mindi Bob is a 50 year old female who is presenting today to establish care.     Patient does not have acute complaints today. In their usual state of health.     Patient is type 2 diabetic. She is on metformin and trulicity. She used to follow with endocrinology. She has recently started gaining some weight and does not believe the trulicity is working as well. Her endocrinologist had started her on mounjaro 10mg but patient has not started taking it yet.     Patient is frustrated because she recently had knee surgery and both her blood sugars and blood pressure went up significantly and she was not prepared for the rise.     She also sees a wellness doctor who prescribes supplements and testosterone for her. Patient says she does well with it, helps with her energy level and she stopped feeling so well when she went briefly off of it.     Patient denies chest pain, SOB, N/V, hematuria, BRBPR, mood disturbances.        Past Medical History:    Anesthesia complication    Anxiety    Arthritis    Diabetes (HCC)    Disorder of thyroid    Hypothyroidism    - + Hashimoto    Obesity    Pseudocholinesterase deficiency       Past Surgical History:   Procedure Laterality Date    Ablation  2021    cervical    Colonoscopy N/A 09/10/2022    Procedure: COLONOSCOPY;  Surgeon: Brooke Claros MD;  Location: [facility] ENDOSCOPY    Knee replacement surgery      Benedict biopsy stereo nodule 1 site left (cpt=19081)  2022    CLIP - TOP HAT      2014    Other surgical history Right     meniscus repair     Other surgical history      exp lap     Other surgical history      sinus surg x 2        Allergies[1]    Family History   Problem Relation Age of Onset    Heart Disorder Father     Hypertension Father     Depression Mother     Obesity Mother     Diabetes Paternal  Grandmother        Social History     Socioeconomic History    Marital status: Single   Tobacco Use    Smoking status: Never     Passive exposure: Never    Smokeless tobacco: Never   Substance and Sexual Activity    Alcohol use: Yes     Alcohol/week: 2.0 standard drinks of alcohol     Types: 2 Glasses of wine per week     Comment: occa    Drug use: Never     Comment: occ gummies    Other Topics Concern    Seat Belt Yes         I have personally reviewed and updated the following EMR sections as appropriate: Current medications, Allergies, Problem list, Past Medical History, Past Surgical History, Social History and Family History    Review of Systems   Constitutional: Negative.    Respiratory: Negative.     Cardiovascular: Negative.    Gastrointestinal: Negative.    Skin: Negative.    Neurological: Negative.        Objective     Medications Ordered Prior to Encounter[2]  /81 (BP Location: Right arm, Patient Position: Sitting, Cuff Size: large)   Pulse 76   Temp 97.8 °F (36.6 °C) (Temporal)   Resp 18   Ht 5' 6\" (1.676 m)   Wt 225 lb (102.1 kg)   SpO2 99%   BMI 36.32 kg/m²   Physical Exam  Vitals reviewed.   Constitutional:       Appearance: Normal appearance.   HENT:      Head: Normocephalic and atraumatic.   Skin:     General: Skin is warm and dry.   Neurological:      General: No focal deficit present.      Mental Status: She is alert and oriented to person, place, and time.   Psychiatric:         Mood and Affect: Mood normal.         Behavior: Behavior normal.         Recent Results (from the past 14 weeks)   HEMOGLOBIN A1C    Collection Time: 12/17/24 11:57 AM   Result Value Ref Range    External HGBA1C 6.1 (H) 4.0 - 5.6 %   EKG 12 Lead    Collection Time: 01/11/25  9:56 AM   Result Value Ref Range    Ventricular rate 61 BPM    Atrial rate 61 BPM    P-R Interval 156 ms    QRS Duration 76 ms    Q-T Interval 378 ms    QTC Calculation (Bezet) 380 ms    P Axis 30 degrees    R Axis 55 degrees    T Axis 41  degrees   Comp Metabolic Panel (14)    Collection Time: 01/11/25  9:57 AM   Result Value Ref Range    Glucose 131 (H) 70 - 99 mg/dL    Sodium 139 136 - 145 mmol/L    Potassium 4.2 3.5 - 5.1 mmol/L    Chloride 105 98 - 112 mmol/L    CO2 28.0 21.0 - 32.0 mmol/L    Anion Gap 6 0 - 18 mmol/L    BUN 12 9 - 23 mg/dL    Creatinine 1.01 0.55 - 1.02 mg/dL    BUN/CREA Ratio 11.9 10.0 - 20.0    Calcium, Total 9.5 8.7 - 10.4 mg/dL    Calculated Osmolality 290 275 - 295 mOsm/kg    eGFR-Cr 68 >=60 mL/min/1.73m2    ALT 29 10 - 49 U/L    AST 23 <34 U/L    Alkaline Phosphatase 51 39 - 100 U/L    Bilirubin, Total 0.5 0.3 - 1.2 mg/dL    Total Protein 7.3 5.7 - 8.2 g/dL    Albumin 4.4 3.2 - 4.8 g/dL    Globulin  2.9 2.0 - 3.5 g/dL    A/G Ratio 1.5 1.0 - 2.0    Patient Fasting for CMP? Yes    Lipid Panel    Collection Time: 01/11/25  9:57 AM   Result Value Ref Range    Cholesterol, Total 196 <200 mg/dL    HDL Cholesterol 40 40 - 59 mg/dL    Triglycerides 120 30 - 149 mg/dL    LDL Cholesterol 134 (H) <100 mg/dL    VLDL 22 0 - 30 mg/dL    Non HDL Chol 156 (H) <130 mg/dL    Patient Fasting for Lipid? Yes    Microalb/Creat Ratio, Random Urine    Collection Time: 01/11/25  9:57 AM   Result Value Ref Range    Microalbumin, Urine <0.30 mg/dL    Creatinine Ur Random 81.20 mg/dL    Malb/Cre Calc     TSH and Free T4    Collection Time: 01/11/25  9:57 AM   Result Value Ref Range    Free T4 1.5 0.8 - 1.7 ng/dL    TSH 1.415 0.550 - 4.780 uIU/mL   CBC, Platelet; No Differential    Collection Time: 01/11/25  9:57 AM   Result Value Ref Range    WBC 8.9 4.0 - 11.0 x10(3) uL    RBC 4.78 3.80 - 5.30 x10(6)uL    HGB 14.1 12.0 - 16.0 g/dL    HCT 41.0 35.0 - 48.0 %    MCV 85.8 80.0 - 100.0 fL    MCH 29.5 26.0 - 34.0 pg    MCHC 34.4 31.0 - 37.0 g/dL    RDW 13.0 11.0 - 15.0 %    RDW-SD 40.3 35.1 - 46.3 fL    .0 150.0 - 450.0 10(3)uL   MRSA Screen by PCR    Collection Time: 01/11/25  9:57 AM   Result Value Ref Range    MRSA Screen By PCR Negative  Negative   Hemoglobin A1C    Collection Time: 01/11/25  9:57 AM   Result Value Ref Range    HgbA1C 5.8 (H) <5.7 %    Estimated Average Glucose 120 68 - 126 mg/dL   POC HemoCue Glucose 201 (Finger stick glucose)    Collection Time: 01/15/25 11:55 AM   Result Value Ref Range    GLUCOSE BLOOD 145     Test Strip Lot # [id number] Numeric    Test Strip Expiration Date 5,302,025 Date   POCT Glucose    Collection Time: 02/17/25 10:31 AM   Result Value Ref Range    POC Glucose  143 (H) 70 - 99 mg/dL   EKG 12 Lead    Collection Time: 02/17/25 10:50 AM   Result Value Ref Range    Ventricular rate 80 BPM    Atrial rate 80 BPM    P-R Interval 160 ms    QRS Duration 84 ms    Q-T Interval 356 ms    QTC Calculation (Bezet) 410 ms    P Axis 44 degrees    R Axis 19 degrees    T Axis 24 degrees   CBC With Differential With Platelet    Collection Time: 02/17/25 11:08 AM   Result Value Ref Range    WBC 13.1 (H) 4.0 - 11.0 x10(3) uL    RBC 4.81 3.80 - 5.30 x10(6)uL    HGB 14.0 12.0 - 16.0 g/dL    HCT 40.5 35.0 - 48.0 %    MCV 84.2 80.0 - 100.0 fL    MCH 29.1 26.0 - 34.0 pg    MCHC 34.6 31.0 - 37.0 g/dL    RDW-SD 38.9 35.1 - 46.3 fL    RDW 12.8 11.0 - 15.0 %    .0 150.0 - 450.0 10(3)uL    Neutrophil Absolute Prelim 10.31 (H) 1.50 - 7.70 x10 (3) uL    Neutrophil Absolute 10.31 (H) 1.50 - 7.70 x10(3) uL    Lymphocyte Absolute 1.94 1.00 - 4.00 x10(3) uL    Monocyte Absolute 0.45 0.10 - 1.00 x10(3) uL    Eosinophil Absolute 0.21 0.00 - 0.70 x10(3) uL    Basophil Absolute 0.08 0.00 - 0.20 x10(3) uL    Immature Granulocyte Absolute 0.06 0.00 - 1.00 x10(3) uL    Neutrophil % 79.0 %    Lymphocyte % 14.9 %    Monocyte % 3.4 %    Eosinophil % 1.6 %    Basophil % 0.6 %    Immature Granulocyte % 0.5 %   Urinalysis with Culture Reflex    Collection Time: 02/17/25 11:08 AM    Specimen: Urine, clean catch   Result Value Ref Range    Urine Color Light-Yellow Yellow    Clarity Urine Clear Clear    Spec Gravity 1.010 1.005 - 1.030    Glucose Urine  Normal Normal mg/dL    Bilirubin Urine Negative Negative    Ketones Urine Negative Negative mg/dL    Blood Urine Negative Negative    pH Urine 5.5 5.0 - 8.0    Protein Urine Negative Negative mg/dL    Urobilinogen Urine Normal Normal    Nitrite Urine Negative Negative    Leukocyte Esterase Urine Negative Negative    Microscopic Microscopic not indicated    D-Dimer    Collection Time: 02/17/25 11:08 AM   Result Value Ref Range    D-Dimer 3.39 (H) <0.50 ug/mL FEU   Basic Metabolic Panel (8)    Collection Time: 02/17/25 11:36 AM   Result Value Ref Range    Glucose 121 (H) 70 - 99 mg/dL    Sodium 138 136 - 145 mmol/L    Potassium 4.4 3.5 - 5.1 mmol/L    Chloride 104 98 - 112 mmol/L    CO2 26.0 21.0 - 32.0 mmol/L    Anion Gap 8 0 - 18 mmol/L    BUN 15 9 - 23 mg/dL    Creatinine 0.91 0.55 - 1.02 mg/dL    BUN/CREA Ratio 16.5 10.0 - 20.0    Calcium, Total 9.3 8.7 - 10.4 mg/dL    Calculated Osmolality 288 275 - 295 mOsm/kg    eGFR-Cr 77 >=60 mL/min/1.73m2   Hepatic Function Panel (7)    Collection Time: 02/17/25 11:36 AM   Result Value Ref Range    AST 29 <34 U/L    ALT 36 10 - 49 U/L    Alkaline Phosphatase 86 39 - 100 U/L    Bilirubin, Total 0.5 0.3 - 1.2 mg/dL    Bilirubin, Direct 0.1 <=0.3 mg/dL    Total Protein 7.6 5.7 - 8.2 g/dL    Albumin 4.8 3.2 - 4.8 g/dL   CBC W Differential W Platelet [E]    Collection Time: 02/21/25 11:04 AM   Result Value Ref Range    WBC 12.7 (H) 4.0 - 11.0 x10(3) uL    RBC 4.65 3.80 - 5.30 x10(6)uL    HGB 14.2 12.0 - 16.0 g/dL    HCT 39.9 35.0 - 48.0 %    MCV 85.8 80.0 - 100.0 fL    MCH 30.5 26.0 - 34.0 pg    MCHC 35.6 31.0 - 37.0 g/dL    RDW-SD 39.7 35.1 - 46.3 fL    RDW 12.9 11.0 - 15.0 %    .0 (H) 150.0 - 450.0 10(3)uL    Neutrophil Absolute Prelim 9.15 (H) 1.50 - 7.70 x10 (3) uL    Neutrophil Absolute 9.15 (H) 1.50 - 7.70 x10(3) uL    Lymphocyte Absolute 2.51 1.00 - 4.00 x10(3) uL    Monocyte Absolute 0.54 0.10 - 1.00 x10(3) uL    Eosinophil Absolute 0.29 0.00 - 0.70 x10(3) uL     Basophil Absolute 0.09 0.00 - 0.20 x10(3) uL    Immature Granulocyte Absolute 0.07 0.00 - 1.00 x10(3) uL    Neutrophil % 72.3 %    Lymphocyte % 19.8 %    Monocyte % 4.3 %    Eosinophil % 2.3 %    Basophil % 0.7 %    Immature Granulocyte % 0.6 %       Assessment and Plan      Type 2 diabetes mellitus with hyperglycemia, without long-term current use of insulin (HCC) (Primary)  Morbid (severe) obesity due to excess calories (HCC)  Other orders  -     Mounjaro; Inject 7.5 mg into the skin once a week.  Dispense: 2 mL; Refill: 0    Will switch patient from trulicity to mounjaro.   Start with 7.5mg instead of 10mg. Monitor for side effects.   Weight loss goal of 1-4lbs a month.   Follow-up in 4 weeks to discuss whether we should keep at 7.5mg or increase to 10mg.     Return in about 4 weeks (around 4/15/2025).    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 90 tablet 2    atorvastatin 10 MG Oral Tab Take 1 tablet (10 mg total) by mouth nightly. 90 tablet 0    Ferrous Bisglycinate Chelate 28 MG Oral Cap       minoxidil 2.5 MG Oral Tab       traZODone 100 MG Oral Tab Take 1 tablet (100 mg total) by mouth nightly.      lisinopril 10 MG Oral Tab Take 1 tablet (10 mg total) by mouth daily. 90 tablet 2    busPIRone 10 MG Oral Tab Take 1 tablet (10 mg total) by mouth in the morning and 1 tablet (10 mg total) before bedtime. 180 tablet 3    progesterone 200 MG Oral Cap Take 1 capsule (200 mg total) by mouth nightly.      TESTOSTERONE BU Place inside cheek.      Emollient (DHEA EX) Apply 50 mg topically.      Lactobacillus (PROBIOTIC ACIDOPHILUS) Oral Tab Take by mouth.      B  Complex Vitamins (VITAMIN B COMPLEX OR) Inject as directed.      Magnesium 200 MG Oral Tab Take by mouth.      Cholecalciferol (VITAMIN D3) 250 MCG (46001 UT) Oral Cap        No current facility-administered medications on file prior to visit.

## 2025-04-23 ENCOUNTER — OFFICE VISIT (OUTPATIENT)
Dept: INTERNAL MEDICINE CLINIC | Facility: CLINIC | Age: 50
End: 2025-04-23
Payer: COMMERCIAL

## 2025-04-23 VITALS
RESPIRATION RATE: 18 BRPM | BODY MASS INDEX: 35.36 KG/M2 | TEMPERATURE: 97 F | DIASTOLIC BLOOD PRESSURE: 68 MMHG | HEIGHT: 66 IN | OXYGEN SATURATION: 97 % | HEART RATE: 86 BPM | SYSTOLIC BLOOD PRESSURE: 102 MMHG | WEIGHT: 220 LBS

## 2025-04-23 DIAGNOSIS — E11.65 TYPE 2 DIABETES MELLITUS WITH HYPERGLYCEMIA, WITHOUT LONG-TERM CURRENT USE OF INSULIN (HCC): Primary | ICD-10-CM

## 2025-04-23 DIAGNOSIS — E66.01 MORBID (SEVERE) OBESITY DUE TO EXCESS CALORIES (HCC): ICD-10-CM

## 2025-04-23 PROCEDURE — 3074F SYST BP LT 130 MM HG: CPT | Performed by: STUDENT IN AN ORGANIZED HEALTH CARE EDUCATION/TRAINING PROGRAM

## 2025-04-23 PROCEDURE — 3008F BODY MASS INDEX DOCD: CPT | Performed by: STUDENT IN AN ORGANIZED HEALTH CARE EDUCATION/TRAINING PROGRAM

## 2025-04-23 PROCEDURE — 3078F DIAST BP <80 MM HG: CPT | Performed by: STUDENT IN AN ORGANIZED HEALTH CARE EDUCATION/TRAINING PROGRAM

## 2025-04-23 PROCEDURE — 99214 OFFICE O/P EST MOD 30 MIN: CPT | Performed by: STUDENT IN AN ORGANIZED HEALTH CARE EDUCATION/TRAINING PROGRAM

## 2025-04-23 RX ORDER — TIRZEPATIDE 7.5 MG/.5ML
7.5 INJECTION, SOLUTION SUBCUTANEOUS WEEKLY
Qty: 6 ML | Refills: 0 | Status: SHIPPED | OUTPATIENT
Start: 2025-04-23

## 2025-04-23 NOTE — PROGRESS NOTES
The following individual(s) verbally consented to be recorded using ambient AI listening technology and understand that they can each withdraw their consent to this listening technology at any point by asking the clinician to turn off or pause the recording:YES    Patient name: Mindi Bob  Additional names:

## 2025-04-23 NOTE — PROGRESS NOTES
Subjective     Chief Complaint   Patient presents with    Follow - Up       History of Present Illness  Mindi Bob is a 50-year-old female with diabetes who presents for follow-up on Mounjaro treatment.    She is currently on Montaro 7.5 mg for diabetes management, having previously been on Trulicity. She experiences queasiness typically on Fridays, the day after taking the injection, and significant fatigue, describing herself as 'exhausted' despite adequate sleep. The fatigue is partly attributed to reduced food intake due to the medication's appetite-curbing effects.    She is actively managing her diet, consuming a high-protein diet with protein shakes from [employer], and is taking a multivitamin and a B vitamin. She has lost 5 pounds since March 18, 2025, going from 225 pounds to 220 pounds as of April 23, 2025.    Her past medical history includes Hashimoto's thyroiditis and diabetes. She had knee surgery in February, which was followed by blood work showing high white blood cell counts and platelets, attributed to the surgery.    She had blood work done in December and typically undergoes it twice a year. She is due for another round of blood work soon to monitor her medication effects and blood cell counts.    Results  LABS  WBC: elevated (02/2025)  PLT: elevated (02/2025)    Past Medical History[1]    Past Surgical History[2]    Allergies[3]    Family History[4]    Social Hx on file[5]      I have personally reviewed and updated the following EMR sections as appropriate: Current medications, Allergies, Problem list, Past Medical History, Past Surgical History, Social History and Family History    Review of Systems   Constitutional: Negative.    Respiratory: Negative.     Cardiovascular: Negative.    Gastrointestinal: Negative.    Skin: Negative.    Neurological: Negative.        Objective     Medications Ordered Prior to Encounter[6]  /68 (BP Location: Right arm, Patient Position: Sitting, Cuff Size:  adult)   Pulse 86   Temp 97 °F (36.1 °C) (Temporal)   Resp 18   Ht 5' 6\" (1.676 m)   Wt 220 lb (99.8 kg)   SpO2 97%   BMI 35.51 kg/m²   Physical Exam  Vitals reviewed.   Constitutional:       Appearance: Normal appearance.   HENT:      Head: Normocephalic and atraumatic.   Skin:     General: Skin is warm and dry.   Neurological:      General: No focal deficit present.      Mental Status: She is alert and oriented to person, place, and time.   Psychiatric:         Mood and Affect: Mood normal.         Behavior: Behavior normal.         Recent Results (from the past 14 weeks)   POC HemoCue Glucose 201 (Finger stick glucose)    Collection Time: 01/15/25 11:55 AM   Result Value Ref Range    GLUCOSE BLOOD 145     Test Strip Lot # [id number] Numeric    Test Strip Expiration Date 5,302,025 Date   POCT Glucose    Collection Time: 02/17/25 10:31 AM   Result Value Ref Range    POC Glucose  143 (H) 70 - 99 mg/dL   EKG 12 Lead    Collection Time: 02/17/25 10:50 AM   Result Value Ref Range    Ventricular rate 80 BPM    Atrial rate 80 BPM    P-R Interval 160 ms    QRS Duration 84 ms    Q-T Interval 356 ms    QTC Calculation (Bezet) 410 ms    P Axis 44 degrees    R Axis 19 degrees    T Axis 24 degrees   CBC With Differential With Platelet    Collection Time: 02/17/25 11:08 AM   Result Value Ref Range    WBC 13.1 (H) 4.0 - 11.0 x10(3) uL    RBC 4.81 3.80 - 5.30 x10(6)uL    HGB 14.0 12.0 - 16.0 g/dL    HCT 40.5 35.0 - 48.0 %    MCV 84.2 80.0 - 100.0 fL    MCH 29.1 26.0 - 34.0 pg    MCHC 34.6 31.0 - 37.0 g/dL    RDW-SD 38.9 35.1 - 46.3 fL    RDW 12.8 11.0 - 15.0 %    .0 150.0 - 450.0 10(3)uL    Neutrophil Absolute Prelim 10.31 (H) 1.50 - 7.70 x10 (3) uL    Neutrophil Absolute 10.31 (H) 1.50 - 7.70 x10(3) uL    Lymphocyte Absolute 1.94 1.00 - 4.00 x10(3) uL    Monocyte Absolute 0.45 0.10 - 1.00 x10(3) uL    Eosinophil Absolute 0.21 0.00 - 0.70 x10(3) uL    Basophil Absolute 0.08 0.00 - 0.20 x10(3) uL    Immature  Granulocyte Absolute 0.06 0.00 - 1.00 x10(3) uL    Neutrophil % 79.0 %    Lymphocyte % 14.9 %    Monocyte % 3.4 %    Eosinophil % 1.6 %    Basophil % 0.6 %    Immature Granulocyte % 0.5 %   Urinalysis with Culture Reflex    Collection Time: 02/17/25 11:08 AM    Specimen: Urine, clean catch   Result Value Ref Range    Urine Color Light-Yellow Yellow    Clarity Urine Clear Clear    Spec Gravity 1.010 1.005 - 1.030    Glucose Urine Normal Normal mg/dL    Bilirubin Urine Negative Negative    Ketones Urine Negative Negative mg/dL    Blood Urine Negative Negative    pH Urine 5.5 5.0 - 8.0    Protein Urine Negative Negative mg/dL    Urobilinogen Urine Normal Normal    Nitrite Urine Negative Negative    Leukocyte Esterase Urine Negative Negative    Microscopic Microscopic not indicated    D-Dimer    Collection Time: 02/17/25 11:08 AM   Result Value Ref Range    D-Dimer 3.39 (H) <0.50 ug/mL FEU   Basic Metabolic Panel (8)    Collection Time: 02/17/25 11:36 AM   Result Value Ref Range    Glucose 121 (H) 70 - 99 mg/dL    Sodium 138 136 - 145 mmol/L    Potassium 4.4 3.5 - 5.1 mmol/L    Chloride 104 98 - 112 mmol/L    CO2 26.0 21.0 - 32.0 mmol/L    Anion Gap 8 0 - 18 mmol/L    BUN 15 9 - 23 mg/dL    Creatinine 0.91 0.55 - 1.02 mg/dL    BUN/CREA Ratio 16.5 10.0 - 20.0    Calcium, Total 9.3 8.7 - 10.4 mg/dL    Calculated Osmolality 288 275 - 295 mOsm/kg    eGFR-Cr 77 >=60 mL/min/1.73m2   Hepatic Function Panel (7)    Collection Time: 02/17/25 11:36 AM   Result Value Ref Range    AST 29 <34 U/L    ALT 36 10 - 49 U/L    Alkaline Phosphatase 86 39 - 100 U/L    Bilirubin, Total 0.5 0.3 - 1.2 mg/dL    Bilirubin, Direct 0.1 <=0.3 mg/dL    Total Protein 7.6 5.7 - 8.2 g/dL    Albumin 4.8 3.2 - 4.8 g/dL   CBC W Differential W Platelet [E]    Collection Time: 02/21/25 11:04 AM   Result Value Ref Range    WBC 12.7 (H) 4.0 - 11.0 x10(3) uL    RBC 4.65 3.80 - 5.30 x10(6)uL    HGB 14.2 12.0 - 16.0 g/dL    HCT 39.9 35.0 - 48.0 %    MCV 85.8 80.0  - 100.0 fL    MCH 30.5 26.0 - 34.0 pg    MCHC 35.6 31.0 - 37.0 g/dL    RDW-SD 39.7 35.1 - 46.3 fL    RDW 12.9 11.0 - 15.0 %    .0 (H) 150.0 - 450.0 10(3)uL    Neutrophil Absolute Prelim 9.15 (H) 1.50 - 7.70 x10 (3) uL    Neutrophil Absolute 9.15 (H) 1.50 - 7.70 x10(3) uL    Lymphocyte Absolute 2.51 1.00 - 4.00 x10(3) uL    Monocyte Absolute 0.54 0.10 - 1.00 x10(3) uL    Eosinophil Absolute 0.29 0.00 - 0.70 x10(3) uL    Basophil Absolute 0.09 0.00 - 0.20 x10(3) uL    Immature Granulocyte Absolute 0.07 0.00 - 1.00 x10(3) uL    Neutrophil % 72.3 %    Lymphocyte % 19.8 %    Monocyte % 4.3 %    Eosinophil % 2.3 %    Basophil % 0.7 %    Immature Granulocyte % 0.6 %       Assessment and Plan      Type 2 diabetes mellitus with hyperglycemia, without long-term current use of insulin (HCC) (Primary)  Morbid (severe) obesity due to excess calories (HCC)  Other orders  -     Mounjaro; Inject 7.5 mg into the skin once a week.  Dispense: 6 mL; Refill: 0      Assessment & Plan  Type 2 diabetes mellitus with hyperglycemia  Mounjaro 7.5 mg effectively manages hyperglycemia, curbs appetite, and aids weight loss. Mild queasiness noted, fatigue likely from reduced caloric intake and medication.  - Continue mounjaro 7.5 mg.  - Send a three-month supply to [facility].  - Monitor weight and dietary intake.  - Reassess energy levels and symptoms in future visits.    Morbid obesity due to excess calories  Weight loss of 5 pounds since March 18, 2025, indicates good progress. Current weight is 220 pounds. High-protein diet and multivitamins support weight management.  - Continue current dietary regimen with emphasis on protein intake.  - Monitor weight regularly.  - Encourage continued adherence to dietary and lifestyle modifications.    Hashimoto's thyroiditis  No new symptoms or concerns. On levothyroxine therapy.       Return in about 12 weeks (around 7/16/2025).    Derrick Schaffer MD  Internal Medicine  4/23/2025       [1]   Past  Medical History:   Anesthesia complication    Anxiety    Arthritis    Diabetes (HCC)    Disorder of thyroid    Hypothyroidism    - + Hashimoto    Obesity    Pseudocholinesterase deficiency   [2]   Past Surgical History:  Procedure Laterality Date    Ablation  2021    cervical    Colonoscopy N/A 09/10/2022    Procedure: COLONOSCOPY;  Surgeon: Brooke Claros MD;  Location: [facility] ENDOSCOPY    Knee replacement surgery      Benedict biopsy stereo nodule 1 site left (cpt=19081)  2022    CLIP - TOP HAT      2014    Other surgical history Right     meniscus repair     Other surgical history      exp lap     Other surgical history      sinus surg x 2    [3]   Allergies  Allergen Reactions    Succinylcholine OTHER (SEE COMMENTS) and UNKNOWN     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   [4]   Family History  Problem Relation Age of Onset    Heart Disorder Father     Hypertension Father     Depression Mother     Obesity Mother     Diabetes Paternal Grandmother    [5]   Social History  Socioeconomic History    Marital status: Single   Tobacco Use    Smoking status: Never     Passive exposure: Never    Smokeless tobacco: Never   Substance and Sexual Activity    Alcohol use: Yes     Alcohol/week: 2.0 standard drinks of alcohol     Types: 2 Glasses of wine per week     Comment: occa    Drug use: Never     Comment: occ gummies    Other Topics Concern    Seat Belt Yes   [6]   Current Outpatient Medications on File Prior to Visit   Medication Sig Dispense Refill    levothyroxine 75 MCG Oral Tab Take 1 tablet (75 mcg total) by mouth before breakfast. 90 tablet 2    Ferrous Bisglycinate Chelate 28 MG Oral Cap       minoxidil 2.5 MG Oral Tab       traZODone 100 MG Oral Tab Take 1 tablet (100 mg total) by mouth nightly.      lisinopril 10 MG Oral Tab Take 1 tablet (10 mg total) by mouth daily. 90 tablet 2    busPIRone 10 MG Oral Tab Take 1 tablet (10 mg total) by mouth in the morning  and 1 tablet (10 mg total) before bedtime. 180 tablet 3    progesterone 200 MG Oral Cap Take 1 capsule (200 mg total) by mouth nightly.      TESTOSTERONE BU Place inside cheek.      Emollient (DHEA EX) Apply 50 mg topically.      Lactobacillus (PROBIOTIC ACIDOPHILUS) Oral Tab Take by mouth.      B Complex Vitamins (VITAMIN B COMPLEX OR) Inject as directed.      Magnesium 200 MG Oral Tab Take by mouth.      Cholecalciferol (VITAMIN D3) 250 MCG (25765 UT) Oral Cap        No current facility-administered medications on file prior to visit.

## 2025-04-25 DIAGNOSIS — I10 PRIMARY HYPERTENSION: ICD-10-CM

## 2025-04-29 RX ORDER — LISINOPRIL 10 MG/1
10 TABLET ORAL DAILY
Qty: 90 TABLET | Refills: 3 | Status: SHIPPED | OUTPATIENT
Start: 2025-04-29

## 2025-05-15 ENCOUNTER — TELEPHONE (OUTPATIENT)
Dept: INTERNAL MEDICINE CLINIC | Facility: CLINIC | Age: 50
End: 2025-05-15

## 2025-05-29 DIAGNOSIS — E11.65 TYPE 2 DIABETES MELLITUS WITH HYPERGLYCEMIA, WITHOUT LONG-TERM CURRENT USE OF INSULIN (HCC): ICD-10-CM

## 2025-05-29 DIAGNOSIS — E78.5 DYSLIPIDEMIA: ICD-10-CM

## 2025-05-30 RX ORDER — ATORVASTATIN CALCIUM 10 MG/1
10 TABLET, FILM COATED ORAL NIGHTLY
Qty: 90 TABLET | Refills: 3 | Status: SHIPPED | OUTPATIENT
Start: 2025-05-30

## 2025-05-30 NOTE — TELEPHONE ENCOUNTER
Endocrine refill protocol for lipid lowering medications    Protocol Criteria:  FAILED Reason: Abnormal labs    If all below requirements are met, send a 90-day supply with 1 refill per provider protocol.    Verify appointment with Endocrinology completed in the last 6 months or scheduled in the next 3 months.  Lipid panel must have been completed in the last 12 months   ALT result below 80  LDL result below 130    Last completed office visit:1/15/2025 Sharla Stuart MD   Last completed telemed visit: Visit date not found  Next scheduled Follow up: no future appt     Last Lipid panel date: Cholesterol: 196, done on 1/11/2025.  HDL Cholesterol: 40, done on 1/11/2025.  TriGlycerides 120, done on 1/11/2025.  LDL Cholesterol: 134, done on 1/11/2025.     Last ALT result: Last ALT was 36 done on 2/17/2025.  Last AST was 29 done on 2/17/2025.

## 2025-07-08 RX ORDER — TIRZEPATIDE 7.5 MG/.5ML
7.5 INJECTION, SOLUTION SUBCUTANEOUS WEEKLY
Qty: 2 ML | Refills: 0 | Status: SHIPPED | OUTPATIENT
Start: 2025-07-08

## 2025-07-08 NOTE — TELEPHONE ENCOUNTER
Patient calling because she had follow up scheduled but missed appointment due to illness. Patient rescheduled appointment with first available of 08/05. Patient is out of medication needs refill until appointment. Please advise    Patient would like to stay at current dosage  Denies side effects  Current Weight: 215  Treatment weight: 225  Current Dose: 7.5 mg  Last office visit: 04/23/25      Ordered pended for signature if appropriate

## 2025-07-09 NOTE — TELEPHONE ENCOUNTER
Patient given a 30-day supply of Mounjaro 7.5 mg weekly    She should follow-up with Dr. Schaffer as scheduled    Dr. Sky, covering for Dr. Schaffer

## 2025-07-09 NOTE — TELEPHONE ENCOUNTER
I called and left message detailed message that  that RX filled for one month but please be sure to keep upcoming scheduled visit with PCP to review and obtain further refills.     If questions, patient to call us back.

## 2025-07-21 ENCOUNTER — TELEPHONE (OUTPATIENT)
Dept: ORTHOPEDICS CLINIC | Facility: CLINIC | Age: 50
End: 2025-07-21

## 2025-07-21 DIAGNOSIS — M25.511 RIGHT SHOULDER PAIN, UNSPECIFIED CHRONICITY: Primary | ICD-10-CM

## 2025-07-21 NOTE — TELEPHONE ENCOUNTER
XR ordered per ortho protocol. XR scheduled and patient was notified via Float: [place] to let them know that they should arrive 15-20 minutes early, in order for them to complete imaging.

## 2025-07-24 DIAGNOSIS — Z72.820 POOR SLEEP: ICD-10-CM

## 2025-07-24 DIAGNOSIS — F41.9 ANXIETY: ICD-10-CM

## 2025-07-25 RX ORDER — TRAZODONE HYDROCHLORIDE 100 MG/1
150 TABLET ORAL NIGHTLY
Qty: 135 TABLET | Refills: 3 | Status: SHIPPED | OUTPATIENT
Start: 2025-07-25

## 2025-07-25 RX ORDER — BUSPIRONE HYDROCHLORIDE 10 MG/1
10 TABLET ORAL 2 TIMES DAILY
Qty: 180 TABLET | Refills: 3 | Status: SHIPPED | OUTPATIENT
Start: 2025-07-25

## 2025-07-25 NOTE — TELEPHONE ENCOUNTER
For replies, please route to pool: [facility] CENTRAL REFILLS    Please review: medication fails/has no protocol attached.    Future Appointments   Date Time Provider Department Center   8/5/2025  1:30 PM Derrick Schaffer MD [facility]

## 2025-07-28 ENCOUNTER — MED REC SCAN ONLY (OUTPATIENT)
Dept: INTERNAL MEDICINE CLINIC | Facility: CLINIC | Age: 50
End: 2025-07-28

## 2025-07-28 ENCOUNTER — OFFICE VISIT (OUTPATIENT)
Dept: ORTHOPEDICS CLINIC | Facility: CLINIC | Age: 50
End: 2025-07-28

## 2025-07-28 ENCOUNTER — HOSPITAL ENCOUNTER (OUTPATIENT)
Dept: GENERAL RADIOLOGY | Age: 50
Discharge: HOME | End: 2025-07-28
Attending: PHYSICIAN ASSISTANT

## 2025-07-28 VITALS — WEIGHT: 220 LBS | BODY MASS INDEX: 35.36 KG/M2 | HEIGHT: 66 IN

## 2025-07-28 DIAGNOSIS — M25.511 RIGHT SHOULDER PAIN, UNSPECIFIED CHRONICITY: ICD-10-CM

## 2025-07-28 DIAGNOSIS — M75.01 ADHESIVE CAPSULITIS OF RIGHT SHOULDER: ICD-10-CM

## 2025-07-28 DIAGNOSIS — M75.31 CALCIFIC TENDINITIS OF RIGHT SHOULDER: Primary | ICD-10-CM

## 2025-07-28 PROCEDURE — 73030 X-RAY EXAM OF SHOULDER: CPT | Performed by: PHYSICIAN ASSISTANT

## 2025-07-28 RX ORDER — MELOXICAM 15 MG/1
15 TABLET ORAL DAILY
Qty: 30 TABLET | Refills: 0 | Status: SHIPPED | OUTPATIENT
Start: 2025-07-28

## 2025-08-05 ENCOUNTER — OFFICE VISIT (OUTPATIENT)
Dept: INTERNAL MEDICINE CLINIC | Facility: CLINIC | Age: 50
End: 2025-08-05

## 2025-08-05 VITALS
RESPIRATION RATE: 18 BRPM | OXYGEN SATURATION: 100 % | WEIGHT: 223.81 LBS | DIASTOLIC BLOOD PRESSURE: 84 MMHG | SYSTOLIC BLOOD PRESSURE: 132 MMHG | HEIGHT: 66 IN | HEART RATE: 75 BPM | BODY MASS INDEX: 35.97 KG/M2 | TEMPERATURE: 98 F

## 2025-08-05 DIAGNOSIS — Z12.4 CERVICAL CANCER SCREENING: Primary | ICD-10-CM

## 2025-08-05 PROCEDURE — 3079F DIAST BP 80-89 MM HG: CPT | Performed by: STUDENT IN AN ORGANIZED HEALTH CARE EDUCATION/TRAINING PROGRAM

## 2025-08-05 PROCEDURE — 3075F SYST BP GE 130 - 139MM HG: CPT | Performed by: STUDENT IN AN ORGANIZED HEALTH CARE EDUCATION/TRAINING PROGRAM

## 2025-08-05 PROCEDURE — 90471 IMMUNIZATION ADMIN: CPT | Performed by: STUDENT IN AN ORGANIZED HEALTH CARE EDUCATION/TRAINING PROGRAM

## 2025-08-05 PROCEDURE — 90750 HZV VACC RECOMBINANT IM: CPT | Performed by: STUDENT IN AN ORGANIZED HEALTH CARE EDUCATION/TRAINING PROGRAM

## 2025-08-05 PROCEDURE — 3008F BODY MASS INDEX DOCD: CPT | Performed by: STUDENT IN AN ORGANIZED HEALTH CARE EDUCATION/TRAINING PROGRAM

## 2025-08-05 PROCEDURE — 99214 OFFICE O/P EST MOD 30 MIN: CPT | Performed by: STUDENT IN AN ORGANIZED HEALTH CARE EDUCATION/TRAINING PROGRAM

## 2025-08-05 RX ORDER — TIRZEPATIDE 7.5 MG/.5ML
7.5 INJECTION, SOLUTION SUBCUTANEOUS WEEKLY
Qty: 6 ML | Refills: 0 | Status: SHIPPED | OUTPATIENT
Start: 2025-08-05

## 2025-08-25 RX ORDER — MELOXICAM 15 MG/1
15 TABLET ORAL DAILY
Qty: 30 TABLET | Refills: 0 | Status: SHIPPED | OUTPATIENT
Start: 2025-08-25

## (undated) DEVICE — LINE MNTR ADLT SET O2 INTMD

## (undated) DEVICE — 6 ML SYRINGE LUER-LOCK TIP: Brand: MONOJECT

## (undated) DEVICE — KIT CLEAN ENDOKIT 1.1OZ GOWNX2

## (undated) DEVICE — 3 ML SYRINGE LUER-LOCK TIP: Brand: MONOJECT

## (undated) DEVICE — 35 ML SYRINGE REGULAR TIP: Brand: MONOJECT

## (undated) DEVICE — MEDI-VAC NON-CONDUCTIVE SUCTION TUBING 6MM X 1.8M (6FT.) L: Brand: CARDINAL HEALTH

## (undated) DEVICE — KIT ENDO ORCAPOD 160/180/190

## (undated) NOTE — Clinical Note
Frederic Hernandez,  Just wondering if you could see this lady and comment on her CT sinus. I think a lot of her symptoms are related to her left upper molar. We are considering an aggressive sinus surgery to open the left maxillary sinus but would like to know if extracting the tooth would help her.   Best,   Alexey Malik D.O. Otolaryngology Rhinology, Sinus & Endoscopic skull base surgery

## (undated) NOTE — LETTER
[address]      Authorization for Surgical Operation and Procedure     Date:___________                                                                                                         Time:__________  1. I hereby authorize DR. Estrada Keane, my physician and his/her assistants (if applicable), which may include medical students, residents, and/or fellows, to perform the following surgical operation/ procedure and administer such anesthesia as may be determined necessary by my physician:  STEREOTACTIC BIOPSY WITH TOMOSYNTHESIS OF THE LEFT BREAST WITH CLIP PLACEMENT   on [facility] [address]   2.   I recognize that during the surgical operation/procedure, unforeseen conditions may necessitate additional or different procedures than those listed above. I, therefore, further authorize and request that the above-named surgeon, assistants, or designees perform such procedures as are, in their judgment, necessary and desirable. 3.   My surgeon/physician has discussed prior to my surgery the potential benefits, risks and side effects of this procedure; the likelihood of achieving goals; and potential problems that might occur during recuperation. They also discussed reasonable alternatives to the procedure, including risks, benefits, and side effects related to the alternatives and risks related to not receiving this procedure. I have had all my questions answered and I acknowledge that no guarantee has been made as to the result that may be obtained. 4.   Should the need arise during my operation or immediate post-operative period, I also consent to the administration of blood and/or blood products. Further, I understand that despite careful testing and screening of blood or blood products by collecting agencies, I may still be subject to ill effects as a result of receiving a blood transfusion and/or blood products.   The following are some, but not all, of the potential risks that can occur: fever and allergic reactions, hemolytic reactions, transmission of diseases such as Hepatitis, AIDS and Cytomegalovirus (CMV) and fluid overload. In the event that I wish to have an autologous transfusion of my own blood, or a directed donor transfusion. I will discuss this with my physician. 5.   I authorize the use of any specimen, organs, tissues, body parts or foreign objects that may be removed from my body during the operation/procedure for diagnosis, research or teaching purposes and their subsequent disposal by hospital authorities. I also authorize the release of specimen test results and/or written reports to my treating physician on the hospital medical staff or other referring or consulting physicians involved in my care, at the discretion of the Pathologist or my treating physician. 6.   I consent to the photographing or videotaping of the operations or procedures to be performed, including appropriate portions of my body for medical, scientific, or educational purposes, provided my identity is not revealed by the pictures or by descriptive texts accompanying them. If the procedure has been photographed/videotaped, the surgeon will obtain the original picture, image, videotape or CD. The hospital will not be responsible for storage, release or maintenance of the picture, image, tape or CD.    7.   I consent to the presence of a  or observers in the operating room as deemed necessary by my physician or their designees. 8.   I recognize that in the event my procedure results in extended X-Ray/fluoroscopy time, I may develop a skin reaction. 9. If I have a Do Not Attempt Resuscitation (DNAR) order in place, that status will be suspended while in the operating room, procedural suite, and during the recovery period unless otherwise explicitly stated by me (or a person authorized to consent on my behalf).  The surgeon or my attending physician will determine when the applicable recovery period ends for purposes of reinstating the DNAR order. 10. Patients having a sterilization procedure: I understand that if the procedure is successful the results will be permanent and it will therefore be impossible for me to inseminate, conceive, or bear children. I also understand that the procedure is intended to result in sterility, although the result has not been guaranteed. 11. I acknowledge that my physician has explained sedation/analgesia administration to me including the risk and benefits I consent to the administration of sedation/analgesia as may be necessary or desirable in the judgment of my physician. I CERTIFY THAT I HAVE READ AND FULLY UNDERSTAND THE ABOVE CONSENT TO OPERATION and/or OTHER PROCEDURE.    _________________________________________  __________________________________  Signature of Patient     Signature of Responsible Person         ___________________________________         Printed Name of Responsible Person           _________________________________                  Relationship to Patient  _________________________________________  ______________________________  Signature of Witness          Date  Time    STATEMENT OF PHYSICIAN My signature below affirms that prior to the time of the procedure; I have explained to the patient and/or his/her legal representative, the risks and benefits involved in the proposed treatment and any reasonable alternative to the proposed treatment. I have also explained the risks and benefits involved in refusal of the proposed treatment and alternatives to the proposed treatment and have answered the patient's questions. If I have a significant financial interest in a co-management agreement or a significant financial interest in any product or implant, or other significant relationship used in this procedure/surgery, I have disclosed this and had a discussion with my patient. _______________________________________________________________ _____________________________  Wilma Meg of Physician)                                                                                         (Date)                                   (Time)        Patient Name: Gavin Melvin    : 1975   Printed: 2022      Medical Record #: [id number]                                              Page 1 of

## (undated) NOTE — LETTER
[address]  [address] \[facility]\""  Dept: [phone]  Dept Fax: [phone]  Loc: [phone]      June 13, 2017    Patient: Basil Piña   Date of Visit: 6/13/2017       To Whom It May Concern:    Alo Griffin

## (undated) NOTE — LETTER
1/25/2021              [address] 70         Dear Berkley Quispe,    Our records indicate that the tests ordered for you by Mariela Spencer MD  have not been done.   If you have, in fact, already completed the tests or

## (undated) NOTE — LETTER
AUTHORIZATION FOR SURGICAL OPERATION OR OTHER PROCEDURE    1. I hereby authorize Dr. Gay/ Saray Peterson PA-C, and [facility] staff assigned to my case to perform the following operation and/or procedure at the [facility] site:    Right knee cortisone injection   _______________________________________________________________________________________________      _______________________________________________________________________________________________    2.  My physician has explained the nature and purpose of the operation or other procedure, possible alternative methods of treatment, the risks involved, and the possibility of complication to me.  I acknowledge that no guarantee has been made as to the result that may be obtained.  3.  I recognize that, during the course of this operation, or other procedure, unforseen conditions may necessitate additional or different procedure than those listed above.  I, therefore, further authorize and request that the above named physician, his/her physician assistants or designees perform such procedures as are, in his/her professional opinion, necessary and desirable.  4.  Any tissue or organs removed in the operation or other procedure may be disposed of by and at the discretion of the [facility] and [facility].  5.  I understand that in the event of a medical emergency, I will be transported by local paramedics to [facility] or other hospital emergency department.  6.  I certify that I have read and fully understand the above consent to operation and/or other procedure.    7.  I acknowledge that my physician has explained sedation/analgesia administration to me including the risks and benefits.  I consent to the administration of sedation/analgesia as may be necessary or desirable in the judgement of my physician.    Witness signature: ___________________________________________________ Date:   ______/______/_____                    Time:  ________ A.M.  P.M.       Patient Name:  ______________________________________________________  (please print)      Patient signature:  ___________________________________________________             Relationship to Patient:           []  Parent    Responsible person                          []  Spouse  In case of minor or                    [] Other  _____________   Incompetent name:  __________________________________________________                               (please print)      _____________      Responsible person  In case of minor or  Incompetent signature:  _______________________________________________    Statement of Physician  My signature below affirms that prior to the time of the procedure, I have explained to the patient and/or his/her guardian, the risks and benefits involved in the proposed treatment and any reasonable alternative to the proposed treatment.  I have also explained the risks and benefits involved in the refusal of the proposed treatment and have answered the patient's questions.                        Date:  ______/______/_______  Provider                      Signature:  __________________________________________________________       Time:  ___________ A.M    P.M.

## (undated) NOTE — LETTER
[address], [place]  Authorization for Invasive Procedures  1. I hereby authorize Dr. Colette Green , my physician and whomever may be designated as the doctor's assistant, to perform the following operation and/or procedure:  Colonoscopy on Mindi Bob at [facility].    2. My physician has explained to me the nature and purpose of the operation or other procedure, possible alternative methods of treatment, the risks involved and the possibility of complications to me. I understand the probable consequences of declining the recommended procedure and the alternative methods of treatment. I acknowledge that no guarantee has been made as to the result that may be obtained. 3. I recognize that during the course of this operation or other procedure, unforeseen conditions may necessitate additional or different procedures than those listed above. I, therefore, further authorize and request that the above-named physician, his/her physician assistants, or designees perform such procedures as are, in his/her professional opinion, necessary and desirable. If I have a Do Not Attempt Resuscitation (DNAR) order in place, that status will be suspended while in the operating room, procedural suite, and during the recovery period unless otherwise explicitly stated by me (or a person authorized to consent on my behalf). The surgeon or my attending physician will determine when the applicable recovery period ends for purposes of reinstating the DNAR order. 4. Should the need arise during my operation or immediate post-operative period; I also consent to the administration of blood and/or blood products.  Further, I understand that despite careful testing and screening of blood and blood products, I may still be subject to ill effects as a result of recieving a blood transfusion an/or blood producst. The following are some, but not all, of the potential risks that can occur: fever and allergic reactions, hemolytic reactions, transmission of disease such as hepatitis, AIDS, cytomegalovirus (CMV), and flluid overload. In the event that I wish to have autologous transfusions of my own blood, or a directed donor transfusion, I will discuss this with my physician. 5. I consent to the photographing of the operations or procedures to be performed for the purposes of advancing medicine, science, and/or education, provided my identity is not revealed. If the procedure has been videotaped, the physician/surgeon will obtain the original videotape. The hospital will not be responsible for storage or maintenance of this tape. 6. I consent to the presence of a  or observer as deemed necessary by my physician or his designee. 7. Any tissues or organs removed in the operation or other procedure may be disposed of by and at the discretion of [facility].    8. I understand that the physician and his/her physician assistants may not be employees or agents of [facility], [facility], [facility], but are independent medical practitioners who have been permitted to use its facilities for the care and treatment of their patients. 9. Patients having a sterilization procedure: I understand that if the procedure is successful the results will be permanent and it will therefore be impossible for me to inseminate, conceive or bear children. I also understand that the procedure is intended to result in sterility, although the result has not been guaranteed. 10. I CERTIFY THAT I HAVE READ AND FULLY UNDERSTAND THE ABOVE CONSENT TO OPERATION and/or OTHER PROCEDURE. 11. I acknowledge that my physician has explained sedation/analgesia administration to me including the risks and benefits. I consent to the administration of sedation/analgesia as may be necessary or desirable in the judgment of my physician.      Signature of Patient: ________________________________________________ Date: _________Time: _________    Responsible person in case of minor or unconscious: _____________________________Relationship: ____________     Witness Signature: ____________________________________________ Date: __________ Time: ___________    Statement of Physician  My signature below affirms that prior to the time of the procedure, I have explained to the patient and/or her legal representative, the risks and benefits involved in the proposed treatment and any reasonable alternative to the proposed treatment. I have also explained the risks and benefits involved in the refusal of the proposed treatment and have answered the patient's questions. If I have a significant financial interest in this procedure/surgery, I have disclosed this and had a discussion with my patient.     Signature of Physician:   ________________________________________Date: _________Time:_______ Patient Name: Ab Vogel  : 1975   Printed: 2022    Medical Record #: [id number]

## (undated) NOTE — ED AVS SNAPSHOT
[facility] in [facility].  [address] [facility]    Phone:  [phone]    Fax:  [phone]   MRN: [id number]    Department:  [facility] in [address] [place] [address]   Date of Visit: not participate in your health insurance plan. This may result in a lower benefit level being available to you or other limited reimbursement.   The physician may seek payment directly from you for amounts other than your deductible, co-payment, or co-insu prescription right away and begin taking the medication(s) as directed.   If you believe that any of the medications or instructions on this list is different from what your Primary Care doctor has instructed you - please continue to take your medications a - If you don’t have insurance, [facility] has partnered with [facility] to help you get signed up for insurance coverage.   [facility] [employer] is a Federal [employer]igator program that can help with your Affordable Care Act cover

## (undated) NOTE — LETTER
[address] [place] [address] Rd, [place], [place]       INFORMED REFUSAL FOR TREATMENT / PROCEDURE / TESTING      I have been advised by Dr. Monica Vyas and [facility] CRNA that it is necessary for me to undergo the following treatment, procedure or testing. The treatment, procedure or test is as follows:    URINE PREGNANCY TEST    The need for this treatment, procedures and/or testing, its benefits and risks, and alternatives has been explained to me by my physician. I understand that my refusal to consent to the recommended medical treatment or testing may seriously impair my health and even jeopardize my life. While I understand the possible consequences, I nevertheless refuse to submit to the recommended treatment, procedure or testing against medical advice.   I assume responsibility for the consequences of this refusal and release [facility], [facility], it affiliates and subsidiaries, its employees and agents, and the physician, from any and all liability associated with my refusal to follow the medical advice and permit treatment, procedure or testing.          ________________________________________  (Patient Signature)      Bob Roman  (Patient Name, Printed)    ________________________________________  (Date)                                Patient Name: Bob Owens     : 1975                 Printed: 9/10/2022      Medical Record #: [id number]                                              Page 2 of 2